# Patient Record
Sex: FEMALE | Race: WHITE | NOT HISPANIC OR LATINO | Employment: OTHER | URBAN - METROPOLITAN AREA
[De-identification: names, ages, dates, MRNs, and addresses within clinical notes are randomized per-mention and may not be internally consistent; named-entity substitution may affect disease eponyms.]

---

## 2017-03-01 ENCOUNTER — APPOINTMENT (OUTPATIENT)
Dept: LAB | Facility: CLINIC | Age: 82
End: 2017-03-01
Payer: MEDICARE

## 2017-03-01 ENCOUNTER — TRANSCRIBE ORDERS (OUTPATIENT)
Dept: LAB | Facility: CLINIC | Age: 82
End: 2017-03-01

## 2017-03-01 DIAGNOSIS — C50.919 MALIGNANT NEOPLASM OF FEMALE BREAST, UNSPECIFIED LATERALITY, UNSPECIFIED SITE OF BREAST: Primary | ICD-10-CM

## 2017-03-01 LAB
ALBUMIN SERPL BCP-MCNC: 3.8 G/DL (ref 3.5–5)
ALP SERPL-CCNC: 51 U/L (ref 46–116)
ALT SERPL W P-5'-P-CCNC: 35 U/L (ref 12–78)
ANION GAP SERPL CALCULATED.3IONS-SCNC: 9 MMOL/L (ref 4–13)
AST SERPL W P-5'-P-CCNC: 27 U/L (ref 5–45)
BASOPHILS # BLD AUTO: 0.07 THOUSANDS/ΜL (ref 0–0.1)
BASOPHILS NFR BLD AUTO: 1 % (ref 0–1)
BILIRUB SERPL-MCNC: 0.22 MG/DL (ref 0.2–1)
BUN SERPL-MCNC: 16 MG/DL (ref 5–25)
CALCIUM SERPL-MCNC: 8.6 MG/DL (ref 8.3–10.1)
CHLORIDE SERPL-SCNC: 110 MMOL/L (ref 100–108)
CO2 SERPL-SCNC: 25 MMOL/L (ref 21–32)
CREAT SERPL-MCNC: 1.09 MG/DL (ref 0.6–1.3)
EOSINOPHIL # BLD AUTO: 0.28 THOUSAND/ΜL (ref 0–0.61)
EOSINOPHIL NFR BLD AUTO: 3 % (ref 0–6)
ERYTHROCYTE [DISTWIDTH] IN BLOOD BY AUTOMATED COUNT: 14.6 % (ref 11.6–15.1)
GFR SERPL CREATININE-BSD FRML MDRD: 47.6 ML/MIN/1.73SQ M
GLUCOSE SERPL-MCNC: 136 MG/DL (ref 65–140)
HCT VFR BLD AUTO: 38.7 % (ref 34.8–46.1)
HGB BLD-MCNC: 12.4 G/DL (ref 11.5–15.4)
LYMPHOCYTES # BLD AUTO: 2.48 THOUSANDS/ΜL (ref 0.6–4.47)
LYMPHOCYTES NFR BLD AUTO: 26 % (ref 14–44)
MCH RBC QN AUTO: 30.2 PG (ref 26.8–34.3)
MCHC RBC AUTO-ENTMCNC: 32 G/DL (ref 31.4–37.4)
MCV RBC AUTO: 94 FL (ref 82–98)
MONOCYTES # BLD AUTO: 0.6 THOUSAND/ΜL (ref 0.17–1.22)
MONOCYTES NFR BLD AUTO: 6 % (ref 4–12)
NEUTROPHILS # BLD AUTO: 6.22 THOUSANDS/ΜL (ref 1.85–7.62)
NEUTS SEG NFR BLD AUTO: 64 % (ref 43–75)
NRBC BLD AUTO-RTO: 0 /100 WBCS
PLATELET # BLD AUTO: 346 THOUSANDS/UL (ref 149–390)
PMV BLD AUTO: 11 FL (ref 8.9–12.7)
POTASSIUM SERPL-SCNC: 3.9 MMOL/L (ref 3.5–5.3)
PROT SERPL-MCNC: 7.4 G/DL (ref 6.4–8.2)
RBC # BLD AUTO: 4.1 MILLION/UL (ref 3.81–5.12)
SODIUM SERPL-SCNC: 144 MMOL/L (ref 136–145)
WBC # BLD AUTO: 9.67 THOUSAND/UL (ref 4.31–10.16)

## 2017-03-01 PROCEDURE — 36415 COLL VENOUS BLD VENIPUNCTURE: CPT

## 2017-03-01 PROCEDURE — 85025 COMPLETE CBC W/AUTO DIFF WBC: CPT

## 2017-03-01 PROCEDURE — 86300 IMMUNOASSAY TUMOR CA 15-3: CPT

## 2017-03-01 PROCEDURE — 80053 COMPREHEN METABOLIC PANEL: CPT

## 2017-03-02 LAB — CANCER AG27-29 SERPL-ACNC: 17.2 U/ML (ref 0–42.3)

## 2017-03-08 ENCOUNTER — GENERIC CONVERSION - ENCOUNTER (OUTPATIENT)
Dept: OTHER | Facility: OTHER | Age: 82
End: 2017-03-08

## 2017-04-13 ENCOUNTER — ALLSCRIPTS OFFICE VISIT (OUTPATIENT)
Dept: OTHER | Facility: OTHER | Age: 82
End: 2017-04-13

## 2017-05-22 ENCOUNTER — APPOINTMENT (OUTPATIENT)
Dept: LAB | Facility: CLINIC | Age: 82
End: 2017-05-22
Payer: MEDICARE

## 2017-05-22 ENCOUNTER — TRANSCRIBE ORDERS (OUTPATIENT)
Dept: LAB | Facility: CLINIC | Age: 82
End: 2017-05-22

## 2017-05-22 DIAGNOSIS — Z79.899 ENCOUNTER FOR LONG-TERM (CURRENT) USE OF OTHER MEDICATIONS: ICD-10-CM

## 2017-05-22 DIAGNOSIS — N39.0 URINARY TRACT INFECTION, SITE NOT SPECIFIED: Primary | ICD-10-CM

## 2017-05-22 DIAGNOSIS — N39.0 URINARY TRACT INFECTION, SITE NOT SPECIFIED: ICD-10-CM

## 2017-05-22 DIAGNOSIS — I10 ESSENTIAL HYPERTENSION, MALIGNANT: ICD-10-CM

## 2017-05-22 LAB
ALBUMIN SERPL BCP-MCNC: 4.2 G/DL (ref 3.5–5)
ALP SERPL-CCNC: 46 U/L (ref 46–116)
ALT SERPL W P-5'-P-CCNC: 38 U/L (ref 12–78)
ANION GAP SERPL CALCULATED.3IONS-SCNC: 8 MMOL/L (ref 4–13)
AST SERPL W P-5'-P-CCNC: 31 U/L (ref 5–45)
BACTERIA UR QL AUTO: ABNORMAL /HPF
BASOPHILS # BLD AUTO: 0.05 THOUSANDS/ΜL (ref 0–0.1)
BASOPHILS NFR BLD AUTO: 1 % (ref 0–1)
BILIRUB SERPL-MCNC: 0.47 MG/DL (ref 0.2–1)
BILIRUB UR QL STRIP: NEGATIVE
BUN SERPL-MCNC: 20 MG/DL (ref 5–25)
CALCIUM SERPL-MCNC: 9.5 MG/DL (ref 8.3–10.1)
CHLORIDE SERPL-SCNC: 108 MMOL/L (ref 100–108)
CLARITY UR: CLEAR
CO2 SERPL-SCNC: 25 MMOL/L (ref 21–32)
COLOR UR: YELLOW
CREAT SERPL-MCNC: 1.13 MG/DL (ref 0.6–1.3)
EOSINOPHIL # BLD AUTO: 0.24 THOUSAND/ΜL (ref 0–0.61)
EOSINOPHIL NFR BLD AUTO: 3 % (ref 0–6)
ERYTHROCYTE [DISTWIDTH] IN BLOOD BY AUTOMATED COUNT: 14.2 % (ref 11.6–15.1)
GFR SERPL CREATININE-BSD FRML MDRD: 45.7 ML/MIN/1.73SQ M
GLUCOSE SERPL-MCNC: 92 MG/DL (ref 65–140)
GLUCOSE UR STRIP-MCNC: NEGATIVE MG/DL
HCT VFR BLD AUTO: 38.7 % (ref 34.8–46.1)
HGB BLD-MCNC: 12.7 G/DL (ref 11.5–15.4)
HGB UR QL STRIP.AUTO: NEGATIVE
HYALINE CASTS #/AREA URNS LPF: ABNORMAL /LPF
KETONES UR STRIP-MCNC: NEGATIVE MG/DL
LEUKOCYTE ESTERASE UR QL STRIP: ABNORMAL
LYMPHOCYTES # BLD AUTO: 2.03 THOUSANDS/ΜL (ref 0.6–4.47)
LYMPHOCYTES NFR BLD AUTO: 25 % (ref 14–44)
MCH RBC QN AUTO: 30.9 PG (ref 26.8–34.3)
MCHC RBC AUTO-ENTMCNC: 32.8 G/DL (ref 31.4–37.4)
MCV RBC AUTO: 94 FL (ref 82–98)
MONOCYTES # BLD AUTO: 0.65 THOUSAND/ΜL (ref 0.17–1.22)
MONOCYTES NFR BLD AUTO: 8 % (ref 4–12)
NEUTROPHILS # BLD AUTO: 5.19 THOUSANDS/ΜL (ref 1.85–7.62)
NEUTS SEG NFR BLD AUTO: 63 % (ref 43–75)
NITRITE UR QL STRIP: POSITIVE
NON-SQ EPI CELLS URNS QL MICRO: ABNORMAL /HPF
NRBC BLD AUTO-RTO: 0 /100 WBCS
PH UR STRIP.AUTO: 6 [PH] (ref 4.5–8)
PLATELET # BLD AUTO: 333 THOUSANDS/UL (ref 149–390)
PMV BLD AUTO: 10.7 FL (ref 8.9–12.7)
POTASSIUM SERPL-SCNC: 4.6 MMOL/L (ref 3.5–5.3)
PROT SERPL-MCNC: 7.7 G/DL (ref 6.4–8.2)
PROT UR STRIP-MCNC: NEGATIVE MG/DL
RBC # BLD AUTO: 4.11 MILLION/UL (ref 3.81–5.12)
RBC #/AREA URNS AUTO: ABNORMAL /HPF
SODIUM SERPL-SCNC: 141 MMOL/L (ref 136–145)
SP GR UR STRIP.AUTO: 1.01 (ref 1–1.03)
UROBILINOGEN UR QL STRIP.AUTO: 0.2 E.U./DL
WBC # BLD AUTO: 8.18 THOUSAND/UL (ref 4.31–10.16)
WBC #/AREA URNS AUTO: ABNORMAL /HPF

## 2017-05-22 PROCEDURE — 80053 COMPREHEN METABOLIC PANEL: CPT

## 2017-05-22 PROCEDURE — 36415 COLL VENOUS BLD VENIPUNCTURE: CPT

## 2017-05-22 PROCEDURE — 81001 URINALYSIS AUTO W/SCOPE: CPT

## 2017-05-22 PROCEDURE — 87186 SC STD MICRODIL/AGAR DIL: CPT

## 2017-05-22 PROCEDURE — 87077 CULTURE AEROBIC IDENTIFY: CPT

## 2017-05-22 PROCEDURE — 87086 URINE CULTURE/COLONY COUNT: CPT

## 2017-05-22 PROCEDURE — 85025 COMPLETE CBC W/AUTO DIFF WBC: CPT

## 2017-05-24 LAB — BACTERIA UR CULT: NORMAL

## 2017-06-08 ENCOUNTER — ALLSCRIPTS OFFICE VISIT (OUTPATIENT)
Dept: OTHER | Facility: OTHER | Age: 82
End: 2017-06-08

## 2017-10-11 ENCOUNTER — ALLSCRIPTS OFFICE VISIT (OUTPATIENT)
Dept: OTHER | Facility: OTHER | Age: 82
End: 2017-10-11

## 2017-11-29 ENCOUNTER — APPOINTMENT (OUTPATIENT)
Dept: LAB | Facility: CLINIC | Age: 82
End: 2017-11-29
Payer: MEDICARE

## 2017-11-29 ENCOUNTER — TRANSCRIBE ORDERS (OUTPATIENT)
Dept: LAB | Facility: CLINIC | Age: 82
End: 2017-11-29

## 2017-11-29 DIAGNOSIS — C50.919 MALIGNANT NEOPLASM OF FEMALE BREAST, UNSPECIFIED ESTROGEN RECEPTOR STATUS, UNSPECIFIED LATERALITY, UNSPECIFIED SITE OF BREAST (HCC): ICD-10-CM

## 2017-11-29 DIAGNOSIS — F03.90 SENILE DEMENTIA, UNCOMPLICATED (HCC): ICD-10-CM

## 2017-11-29 DIAGNOSIS — D64.89 OTHER SPECIFIED ANEMIAS (CODE): Primary | ICD-10-CM

## 2017-11-29 LAB
ALBUMIN SERPL BCP-MCNC: 3.8 G/DL (ref 3.5–5)
ALP SERPL-CCNC: 42 U/L (ref 46–116)
ALT SERPL W P-5'-P-CCNC: 31 U/L (ref 12–78)
ANION GAP SERPL CALCULATED.3IONS-SCNC: 4 MMOL/L (ref 4–13)
AST SERPL W P-5'-P-CCNC: 25 U/L (ref 5–45)
BILIRUB SERPL-MCNC: 0.26 MG/DL (ref 0.2–1)
BUN SERPL-MCNC: 22 MG/DL (ref 5–25)
CALCIUM SERPL-MCNC: 8.8 MG/DL (ref 8.3–10.1)
CHLORIDE SERPL-SCNC: 111 MMOL/L (ref 100–108)
CO2 SERPL-SCNC: 26 MMOL/L (ref 21–32)
CREAT SERPL-MCNC: 1.12 MG/DL (ref 0.6–1.3)
ERYTHROCYTE [DISTWIDTH] IN BLOOD BY AUTOMATED COUNT: 14.5 % (ref 11.6–15.1)
GFR SERPL CREATININE-BSD FRML MDRD: 45 ML/MIN/1.73SQ M
GLUCOSE SERPL-MCNC: 81 MG/DL (ref 65–140)
HCT VFR BLD AUTO: 35 % (ref 34.8–46.1)
HGB BLD-MCNC: 11.5 G/DL (ref 11.5–15.4)
MCH RBC QN AUTO: 30.9 PG (ref 26.8–34.3)
MCHC RBC AUTO-ENTMCNC: 32.9 G/DL (ref 31.4–37.4)
MCV RBC AUTO: 94 FL (ref 82–98)
PLATELET # BLD AUTO: 322 THOUSANDS/UL (ref 149–390)
PMV BLD AUTO: 11 FL (ref 8.9–12.7)
POTASSIUM SERPL-SCNC: 4.2 MMOL/L (ref 3.5–5.3)
PROT SERPL-MCNC: 7.2 G/DL (ref 6.4–8.2)
RBC # BLD AUTO: 3.72 MILLION/UL (ref 3.81–5.12)
SODIUM SERPL-SCNC: 141 MMOL/L (ref 136–145)
WBC # BLD AUTO: 8.86 THOUSAND/UL (ref 4.31–10.16)

## 2017-11-29 PROCEDURE — 80053 COMPREHEN METABOLIC PANEL: CPT

## 2017-11-29 PROCEDURE — 36415 COLL VENOUS BLD VENIPUNCTURE: CPT

## 2017-11-29 PROCEDURE — 85027 COMPLETE CBC AUTOMATED: CPT

## 2018-01-11 ENCOUNTER — HOSPITAL ENCOUNTER (EMERGENCY)
Facility: HOSPITAL | Age: 83
Discharge: HOME | End: 2018-01-11
Attending: EMERGENCY MEDICINE | Admitting: EMERGENCY MEDICINE
Payer: MEDICARE

## 2018-01-11 ENCOUNTER — APPOINTMENT (EMERGENCY)
Dept: RADIOLOGY | Facility: HOSPITAL | Age: 83
End: 2018-01-11
Payer: MEDICARE

## 2018-01-11 VITALS
WEIGHT: 120 LBS | HEART RATE: 77 BPM | OXYGEN SATURATION: 97 % | SYSTOLIC BLOOD PRESSURE: 142 MMHG | DIASTOLIC BLOOD PRESSURE: 69 MMHG | BODY MASS INDEX: 21.95 KG/M2 | TEMPERATURE: 98.3 F | RESPIRATION RATE: 18 BRPM

## 2018-01-11 DIAGNOSIS — R53.1 GENERALIZED WEAKNESS: ICD-10-CM

## 2018-01-11 DIAGNOSIS — N39.0 URINARY TRACT INFECTION IN ELDERLY PATIENT: Primary | ICD-10-CM

## 2018-01-11 DIAGNOSIS — R79.89 ELEVATED TSH: ICD-10-CM

## 2018-01-11 LAB
ALBUMIN SERPL BCP-MCNC: 3.4 G/DL (ref 3.5–5)
ALP SERPL-CCNC: 62 U/L (ref 46–116)
ALT SERPL W P-5'-P-CCNC: 55 U/L (ref 12–78)
AMMONIA PLAS-SCNC: 10 UMOL/L (ref 11–35)
ANION GAP SERPL CALCULATED.3IONS-SCNC: 8 MMOL/L (ref 4–13)
APTT PPP: 25 SECONDS (ref 24–33)
AST SERPL W P-5'-P-CCNC: 49 U/L (ref 5–45)
BACTERIA UR QL AUTO: ABNORMAL /HPF
BASOPHILS # BLD AUTO: 0 THOUSANDS/ΜL (ref 0–0.1)
BASOPHILS NFR BLD AUTO: 0 % (ref 0–1)
BILIRUB SERPL-MCNC: 0.2 MG/DL (ref 0.2–1)
BILIRUB UR QL STRIP: NEGATIVE
BUN SERPL-MCNC: 21 MG/DL (ref 5–25)
CALCIUM SERPL-MCNC: 9 MG/DL (ref 8.3–10.1)
CHLORIDE SERPL-SCNC: 107 MMOL/L (ref 100–108)
CK SERPL-CCNC: 48 U/L (ref 26–192)
CLARITY UR: ABNORMAL
CO2 SERPL-SCNC: 28 MMOL/L (ref 21–32)
COLOR UR: YELLOW
CREAT SERPL-MCNC: 1.05 MG/DL (ref 0.6–1.3)
EOSINOPHIL # BLD AUTO: 0.3 THOUSAND/ΜL (ref 0–0.61)
EOSINOPHIL NFR BLD AUTO: 3 % (ref 0–6)
ERYTHROCYTE [DISTWIDTH] IN BLOOD BY AUTOMATED COUNT: 14.3 % (ref 11.6–15.1)
FLUAV AG SPEC QL IA: NEGATIVE
FLUBV AG SPEC QL IA: NEGATIVE
GFR SERPL CREATININE-BSD FRML MDRD: 48 ML/MIN/1.73SQ M
GLUCOSE SERPL-MCNC: 86 MG/DL (ref 65–140)
GLUCOSE UR STRIP-MCNC: NEGATIVE MG/DL
HCT VFR BLD AUTO: 37.1 % (ref 37–47)
HGB BLD-MCNC: 12.1 G/DL (ref 12–16)
HGB UR QL STRIP.AUTO: ABNORMAL
INR PPP: 0.99 (ref 0.86–1.16)
KETONES UR STRIP-MCNC: NEGATIVE MG/DL
LACTATE SERPL-SCNC: 1.6 MMOL/L (ref 0.5–2)
LEUKOCYTE ESTERASE UR QL STRIP: ABNORMAL
LYMPHOCYTES # BLD AUTO: 2.3 THOUSANDS/ΜL (ref 0.6–4.47)
LYMPHOCYTES NFR BLD AUTO: 24 % (ref 14–44)
MAGNESIUM SERPL-MCNC: 2.2 MG/DL (ref 1.6–2.6)
MCH RBC QN AUTO: 30.8 PG (ref 27–31)
MCHC RBC AUTO-ENTMCNC: 32.6 G/DL (ref 31.4–37.4)
MCV RBC AUTO: 95 FL (ref 82–98)
MONOCYTES # BLD AUTO: 0.7 THOUSAND/ΜL (ref 0.17–1.22)
MONOCYTES NFR BLD AUTO: 7 % (ref 4–12)
NEUTROPHILS # BLD AUTO: 6.1 THOUSANDS/ΜL (ref 1.85–7.62)
NEUTS SEG NFR BLD AUTO: 65 % (ref 43–75)
NITRITE UR QL STRIP: NEGATIVE
NON-SQ EPI CELLS URNS QL MICRO: ABNORMAL /HPF
NRBC BLD AUTO-RTO: 0 /100 WBCS
PH UR STRIP.AUTO: 6 [PH] (ref 5–9)
PLATELET # BLD AUTO: 382 THOUSANDS/UL (ref 130–400)
PMV BLD AUTO: 8 FL (ref 8.9–12.7)
POTASSIUM SERPL-SCNC: 4.2 MMOL/L (ref 3.5–5.3)
PROT SERPL-MCNC: 7.3 G/DL (ref 6.4–8.2)
PROT UR STRIP-MCNC: NEGATIVE MG/DL
PROTHROMBIN TIME: 10.4 SECONDS (ref 9.4–11.7)
RBC # BLD AUTO: 3.93 MILLION/UL (ref 4.2–5.4)
RBC #/AREA URNS AUTO: ABNORMAL /HPF
SODIUM SERPL-SCNC: 143 MMOL/L (ref 136–145)
SP GR UR STRIP.AUTO: 1.01 (ref 1–1.03)
TROPONIN I SERPL-MCNC: <0.02 NG/ML
TSH SERPL DL<=0.05 MIU/L-ACNC: 4.48 UIU/ML (ref 0.36–3.74)
UROBILINOGEN UR QL STRIP.AUTO: 0.2 E.U./DL
WBC # BLD AUTO: 9.4 THOUSAND/UL (ref 4.8–10.8)
WBC #/AREA URNS AUTO: ABNORMAL /HPF

## 2018-01-11 PROCEDURE — 84443 ASSAY THYROID STIM HORMONE: CPT | Performed by: PHYSICIAN ASSISTANT

## 2018-01-11 PROCEDURE — 84484 ASSAY OF TROPONIN QUANT: CPT | Performed by: PHYSICIAN ASSISTANT

## 2018-01-11 PROCEDURE — 85730 THROMBOPLASTIN TIME PARTIAL: CPT | Performed by: PHYSICIAN ASSISTANT

## 2018-01-11 PROCEDURE — 85025 COMPLETE CBC W/AUTO DIFF WBC: CPT | Performed by: PHYSICIAN ASSISTANT

## 2018-01-11 PROCEDURE — 96360 HYDRATION IV INFUSION INIT: CPT

## 2018-01-11 PROCEDURE — 36415 COLL VENOUS BLD VENIPUNCTURE: CPT | Performed by: PHYSICIAN ASSISTANT

## 2018-01-11 PROCEDURE — 87798 DETECT AGENT NOS DNA AMP: CPT | Performed by: PHYSICIAN ASSISTANT

## 2018-01-11 PROCEDURE — 82550 ASSAY OF CK (CPK): CPT | Performed by: PHYSICIAN ASSISTANT

## 2018-01-11 PROCEDURE — 71045 X-RAY EXAM CHEST 1 VIEW: CPT

## 2018-01-11 PROCEDURE — 83735 ASSAY OF MAGNESIUM: CPT | Performed by: PHYSICIAN ASSISTANT

## 2018-01-11 PROCEDURE — 81001 URINALYSIS AUTO W/SCOPE: CPT | Performed by: PHYSICIAN ASSISTANT

## 2018-01-11 PROCEDURE — 82140 ASSAY OF AMMONIA: CPT | Performed by: PHYSICIAN ASSISTANT

## 2018-01-11 PROCEDURE — 83605 ASSAY OF LACTIC ACID: CPT | Performed by: PHYSICIAN ASSISTANT

## 2018-01-11 PROCEDURE — 70450 CT HEAD/BRAIN W/O DYE: CPT

## 2018-01-11 PROCEDURE — 87086 URINE CULTURE/COLONY COUNT: CPT | Performed by: PHYSICIAN ASSISTANT

## 2018-01-11 PROCEDURE — 87400 INFLUENZA A/B EACH AG IA: CPT | Performed by: PHYSICIAN ASSISTANT

## 2018-01-11 PROCEDURE — 93005 ELECTROCARDIOGRAM TRACING: CPT

## 2018-01-11 PROCEDURE — 85610 PROTHROMBIN TIME: CPT | Performed by: PHYSICIAN ASSISTANT

## 2018-01-11 PROCEDURE — 99285 EMERGENCY DEPT VISIT HI MDM: CPT

## 2018-01-11 PROCEDURE — 80053 COMPREHEN METABOLIC PANEL: CPT | Performed by: PHYSICIAN ASSISTANT

## 2018-01-11 PROCEDURE — 96361 HYDRATE IV INFUSION ADD-ON: CPT

## 2018-01-11 RX ORDER — CEPHALEXIN 250 MG/1
250 CAPSULE ORAL 4 TIMES DAILY
Qty: 28 CAPSULE | Refills: 0 | Status: SHIPPED | OUTPATIENT
Start: 2018-01-11 | End: 2018-01-18

## 2018-01-11 RX ORDER — DONEPEZIL HYDROCHLORIDE 5 MG/1
5 TABLET, FILM COATED ORAL
COMMUNITY
End: 2019-08-21 | Stop reason: SDUPTHER

## 2018-01-11 RX ORDER — TAMOXIFEN CITRATE 20 MG/1
20 TABLET ORAL 2 TIMES DAILY
COMMUNITY
End: 2018-02-14 | Stop reason: SDUPTHER

## 2018-01-11 RX ADMIN — SODIUM CHLORIDE 1000 ML: 0.9 INJECTION, SOLUTION INTRAVENOUS at 15:47

## 2018-01-11 NOTE — ED PROVIDER NOTES
History  Chief Complaint   Patient presents with    Weakness - Generalized     fell 1 week ago  family states she didnt her head  no loc  since then she has become weak and her urine is dark and foul smelling     Patient is an 17-year-old female with history of Alzheimer's dementia, hypertension, urinary incontinence, arthritis, and recent mastectomy who is brought into the emergency department by family who state that the patient fell approximately 1 week ago, and has been increasingly weak since that time with decreased appetite and increased agitation  The family denies head trauma or signs of head injury after her fall, and the patient is not on anticoagulation therapy with the exception of ASA  They state that the patient has had follow smelling urine since the weakness began  They deny fevers, chills, chest pain, shortness of breath, abdominal pain, nausea, vomiting, or vision changes  The patient offers no complaints of pain  History was limited from the patient secondary to advanced Alzheimer's  History provided by:  Patient and relative   used: No        Prior to Admission Medications   Prescriptions Last Dose Informant Patient Reported? Taking? Memantine HCl ER (NAMENDA XR) 14 MG CP24 1/11/2018 at Unknown time  Yes Yes   Sig: Take 1 tablet by mouth every morning  aspirin 81 MG tablet 1/11/2018 at Unknown time  Yes Yes   Sig: Take 81 mg by mouth every morning  donepezil (ARICEPT) 5 mg tablet 1/11/2018 at Unknown time  Yes Yes   Sig: Take 5 mg by mouth daily at bedtime   lisinopril (ZESTRIL) 20 mg tablet 1/11/2018 at Unknown time  Yes Yes   Sig: Take 20 mg by mouth every morning  metoprolol tartrate (LOPRESSOR) 25 mg tablet 1/11/2018 at Unknown time  Yes Yes   Sig: Take 25 mg by mouth every morning     tamoxifen (NOLVADEX) 20 mg tablet 1/11/2018 at Unknown time  Yes Yes   Sig: Take 20 mg by mouth 2 (two) times a day      Facility-Administered Medications: None Past Medical History:   Diagnosis Date    Arthritis     Dementia     Hypertension     Incontinent of urine     Varicose veins of legs        Past Surgical History:   Procedure Laterality Date    BREAST EXCISIONAL BIOPSY Right     CATARACT EXTRACTION W/ INTRAOCULAR LENS IMPLANT Right 01/05/2016    EYE SURGERY      lazy eye    MASTECTOMY      MODIFIED RADICAL MASTECTOMY W/ AXILLARY LYMPH NODE DISSECTION Right 6/15/2016    Procedure: MASTECTOMY MODIFIED RADICAL (WITHOUT SENTINEL NODE BIOPSY); Surgeon: Reyes Coto MD;  Location: [address] [place] [address];  Service:     OOPHORECTOMY Right     1965     [facility] [address] CATARACT EXTRACAP,INSERT LENS Left 2/23/2016    Procedure: EXTRACTION EXTRACAPSULAR CATARACT PHACO INTRAOCULAR LENS (IOL); Surgeon: Freda Caro MD;  Location: [facility] MAIN OR;  Service: Ophthalmology   [address]       History reviewed  No pertinent family history  I have reviewed and agree with the history as documented  Social History   Substance Use Topics    Smoking status: Never Smoker    Smokeless tobacco: Never Used    Alcohol use Yes      Comment: socially        Review of Systems   Unable to perform ROS: Dementia (Majority of history obtained from family at bedside)   Constitutional: Positive for appetite change and fatigue  Negative for chills, diaphoresis and fever  Eyes: Negative for photophobia and visual disturbance  Respiratory: Negative for cough, shortness of breath and wheezing  Cardiovascular: Negative for chest pain  Gastrointestinal: Negative for vomiting  Genitourinary:        Dark, foul smelling urine   Skin: Positive for pallor  Negative for rash  Neurological: Positive for weakness  Psychiatric/Behavioral: Positive for agitation  Negative for hallucinations         Physical Exam  ED Triage Vitals [01/11/18 1448]   Temperature Pulse Respirations Blood Pressure SpO2   98 3 °F (36 8 °C) 77 18 142/69 97 %      Temp src Heart Rate Source Patient Position - Orthostatic VS BP Location FiO2 (%)   -- -- -- -- --      Pain Score       No Pain           Orthostatic Vital Signs  Vitals:    01/11/18 1448   BP: 142/69   Pulse: 77       Physical Exam   Constitutional: She is oriented to person, place, and time  She appears well-developed and well-nourished  No distress  HENT:   Head: Normocephalic  Right Ear: External ear normal    Left Ear: External ear normal    Nose: Nose normal    Mouth/Throat: Mucous membranes are dry  No obvious signs of head trauma on exam    Eyes: Conjunctivae are normal  Pupils are equal, round, and reactive to light  Right eye exhibits no discharge  Left eye exhibits no discharge  Neck: Normal range of motion  Neck supple  No tracheal deviation present  Cardiovascular: Normal rate, regular rhythm, normal heart sounds and intact distal pulses  Exam reveals no friction rub  No murmur heard  Pulmonary/Chest: Effort normal and breath sounds normal  No respiratory distress  She has no wheezes  Abdominal: Soft  There is no tenderness  There is no guarding  Musculoskeletal: Normal range of motion  She exhibits no deformity  Neurological: She is alert and oriented to person, place, and time  She exhibits normal muscle tone  Coordination normal    Skin: Skin is warm and dry  Capillary refill takes less than 2 seconds  No rash noted  She is not diaphoretic  There is pallor  Psychiatric: She is agitated  Nursing note and vitals reviewed        ED Medications  Medications   sodium chloride 0 9 % bolus 1,000 mL (0 mL Intravenous Stopped 1/11/18 1830)       Diagnostic Studies  Results Reviewed     Procedure Component Value Units Date/Time    Urine Microscopic [[id number]]  (Abnormal) Collected:  01/11/18 1710    Lab Status:  Final result Specimen:  Urine from Urine, Clean Catch Updated:  01/11/18 1728     RBC, UA 1-2 (A) /hpf      WBC, UA 20-30 (A) /hpf      Epithelial Cells Moderate (A) /hpf      Bacteria, UA Occasional /hpf     Urine culture [[id number]] Collected:  01/11/18 1710    Lab Status: In process Specimen:  Urine from Urine, Clean Catch Updated:  01/11/18 1728    UA w Reflex to Microscopic w Reflex to Culture [[id number]]  (Abnormal) Collected:  01/11/18 1710    Lab Status:  Final result Specimen:  Urine from Urine, Clean Catch Updated:  01/11/18 1715     Color, UA Yellow     Clarity, UA Slightly Cloudy     Specific Gravity, UA 1 015     pH, UA 6 0     Leukocytes, UA Moderate (A)     Nitrite, UA Negative     Protein, UA Negative mg/dl      Glucose, UA Negative mg/dl      Ketones, UA Negative mg/dl      Urobilinogen, UA 0 2 E U /dl      Bilirubin, UA Negative     Blood, UA Trace-Intact (A)    Magnesium [[id number]]  (Normal) Collected:  01/11/18 1540    Lab Status:  Final result Specimen:  Blood from Arm, Left Updated:  01/11/18 1619     Magnesium 2 2 mg/dL     CK Total with Reflex CKMB [[id number]]  (Normal) Collected:  01/11/18 1540    Lab Status:  Final result Specimen:  Blood from Arm, Left Updated:  01/11/18 1619     Total CK 48 U/L     TSH [[id number]]  (Abnormal) Collected:  01/11/18 1540    Lab Status:  Final result Specimen:  Blood from Arm, Left Updated:  01/11/18 1619     TSH 3RD GENERATON 4 478 (H) uIU/mL     Narrative:         Patients undergoing fluorescein dye angiography may retain small amounts of fluorescein in the body for 48-72 hours post procedure  Samples containing fluorescein can produce falsely depressed TSH values  If the patient had this procedure,a specimen should be resubmitted post fluorescein clearance            The recommended reference ranges for TSH during pregnancy are as follows:  First trimester 0 1 to 2 5 uIU/mL  Second trimester  0 2 to 3 0 uIU/mL  Third trimester 0 3 to 3 0 uIU/m      Troponin I [[id number]]  (Normal) Collected:  01/11/18 1540    Lab Status:  Final result Specimen:  Blood from Arm, Left Updated:  01/11/18 1614     Troponin I <0 02 ng/mL     Narrative:         [employer] Chemistry analyzer 99% cutoff is > 0 04 ng/mL in network labs    o cTnI 99% cutoff is useful only when applied to patients in the clinical setting of myocardial ischemia  o cTnI 99% cutoff should be interpreted in the context of clinical history, ECG findings and possibly cardiac imaging to establish correct diagnosis  o cTnI 99% cutoff may be suggestive but clearly not indicative of a coronary event without the clinical setting of myocardial ischemia  Lactic acid, plasma [[id number]]  (Normal) Collected:  01/11/18 1540    Lab Status:  Final result Specimen:  Blood from Arm, Left Updated:  01/11/18 1613     LACTIC ACID 1 6 mmol/L     Narrative:         Result may be elevated if tourniquet was used during collection  Comprehensive metabolic panel [[id number]]  (Abnormal) Collected:  01/11/18 1540    Lab Status:  Final result Specimen:  Blood from Arm, Left Updated:  01/11/18 1611     Sodium 143 mmol/L      Potassium 4 2 mmol/L      Chloride 107 mmol/L      CO2 28 mmol/L      Anion Gap 8 mmol/L      BUN 21 mg/dL      Creatinine 1 05 mg/dL      Glucose 86 mg/dL      Calcium 9 0 mg/dL      AST 49 (H) U/L      ALT 55 U/L      Alkaline Phosphatase 62 U/L      Total Protein 7 3 g/dL      Albumin 3 4 (L) g/dL      Total Bilirubin 0 20 mg/dL      eGFR 48 ml/min/1 73sq m     Narrative:         National Kidney Disease Education Program recommendations are as follows:  GFR calculation is accurate only with a steady state creatinine  Chronic Kidney disease less than 60 ml/min/1 73 sq  meters  Kidney failure less than 15 ml/min/1 73 sq  meters      Rapid Influenza Screen with Reflex PCR (indicated for patients <2mo of age) [[id number]]  (Normal) Collected:  01/11/18 1540    Lab Status:  Final result Specimen:  Nasopharyngeal from Nasopharyngeal Swab Updated:  01/11/18 1609     Rapid Influenza A Ag Negative     Rapid Influenza B Ag Negative    Influenza A/B and RSV by PCR (Indicated for patients > 2 mo of age) [[id number]] Collected:  01/11/18 1540    Lab Status: In process Specimen:  Nasopharyngeal from Nasopharyngeal Swab Updated:  01/11/18 1609    Protime-INR [[id number]]  (Normal) Collected:  01/11/18 1540    Lab Status:  Final result Specimen:  Blood from Arm, Left Updated:  01/11/18 1608     Protime 10 4 seconds      INR 0 99    APTT [[id number]]  (Normal) Collected:  01/11/18 1540    Lab Status:  Final result Specimen:  Blood from Arm, Left Updated:  01/11/18 1608     PTT 25 seconds     Narrative: Therapeutic Heparin Range = 60-90 seconds    Ammonia [[id number]]  (Abnormal) Collected:  01/11/18 1540    Lab Status:  Final result Specimen:  Blood from Arm, Left Updated:  01/11/18 1607     Ammonia 10 (L) umol/L     CBC and differential [[id number]]  (Abnormal) Collected:  01/11/18 1540    Lab Status:  Final result Specimen:  Blood from Arm, Left Updated:  01/11/18 1552     WBC 9 40 Thousand/uL      RBC 3 93 (L) Million/uL      Hemoglobin 12 1 g/dL      Hematocrit 37 1 %      MCV 95 fL      MCH 30 8 pg      MCHC 32 6 g/dL      RDW 14 3 %      MPV 8 0 (L) fL      Platelets 971 Thousands/uL      nRBC 0 /100 WBCs      Neutrophils Relative 65 %      Lymphocytes Relative 24 %      Monocytes Relative 7 %      Eosinophils Relative 3 %      Basophils Relative 0 %      Neutrophils Absolute 6 10 Thousands/µL      Lymphocytes Absolute 2 30 Thousands/µL      Monocytes Absolute 0 70 Thousand/µL      Eosinophils Absolute 0 30 Thousand/µL      Basophils Absolute 0 00 Thousands/µL                  XR chest 1 view portable   Final Result by Mini Hamilton MD (01/11 1620)      No active pulmonary disease  Workstation performed: [id number]         CT head without contrast   Final Result by Mini Hamilton MD (01/11 1617)      No acute intracranial abnormality  Microangiopathic changes           Workstation performed: [id number]                    Procedures  ECG 12 Lead Documentation  Date/Time: 1/11/2018 3:21 PM  Performed by: Flavio Valdivia by: Analilia Burch Indications / Diagnosis:  Weakness  ECG reviewed by me, the ED Provider: yes    Patient location:  ED  Interpretation:     Interpretation: abnormal    Rate:     ECG rate:  71 bpm    ECG rate assessment: normal    Rhythm:     Rhythm: sinus rhythm    Ectopy:     Ectopy: none    QRS:     QRS axis:  Normal    QRS intervals:  Normal  Conduction:     Conduction: abnormal      Abnormal conduction: 1st degree    ST segments:     ST segments:  Normal  T waves:     T waves: normal             Phone Contacts  ED Phone Contact    ED Course  ED Course as of Jan 11 1844   Thu Jan 11, 2018   1806 Patient was refused for admission by hospitalist stating that there was no acute abnormalities that were indication for admission  MDM  Number of Diagnoses or Management Options  Elevated TSH: new and requires workup  Generalized weakness: new and requires workup  Urinary tract infection in elderly patient: new and requires workup  Diagnosis management comments: The patient's lab results returned unremarkable with the exception of UA showing UTI, and elevated TSH  Chest x-ray and CT head were unremarkable for any acute abnormalities  The patient was discharged in no acute distress after discussion with the patient's family, who agreed to discharged home  She was discharged on a course of Keflex and instructed to follow up with her PCP as soon as possible, with strict return precautions to the ED if any worsening symptoms develop         Amount and/or Complexity of Data Reviewed  Clinical lab tests: ordered and reviewed  Tests in the radiology section of CPT®: ordered and reviewed  Review and summarize past medical records: yes  Discuss the patient with other providers: yes (Dr Janelle Hardin)      CritCare Time    Disposition  Final diagnoses:   Urinary tract infection in elderly patient   Elevated TSH   Generalized weakness     Time reflects when diagnosis was documented in both MDM as applicable and the Disposition within this note     Time User Action Codes Description Comment    1/11/2018  6:05 PM Nia Bruins Add [N39 0] Urinary tract infection in elderly patient     1/11/2018  6:06 PM Nia Bruins Add [R94 6] Elevated TSH     1/11/2018  6:06 PM Nia Bruins Add [R53 1] Generalized weakness       ED Disposition     ED Disposition Condition Comment    Discharge  [address] discharge to home/self care  Condition at discharge: Stable        Follow-up Information     Follow up With Specialties Details Why [facility] Emergency Department Emergency Medicine Go to If symptoms worsen [facility]  [phone] [facility], [place], [place], [place] [address] Internal Medicine Go to As soon as possible for follow-up evaluation RUTH Burgess [facility]  [phone]           Discharge Medication List as of 1/11/2018  6:09 PM      START taking these medications    Details   cephalexin (KEFLEX) 250 mg capsule Take 1 capsule by mouth 4 (four) times a day for 7 days, Starting Thu 1/11/2018, Until Thu 1/18/2018, Print         CONTINUE these medications which have NOT CHANGED    Details   aspirin 81 MG tablet Take 81 mg by mouth every morning , Until Discontinued, Historical Med      donepezil (ARICEPT) 5 mg tablet Take 5 mg by mouth daily at bedtime, Historical Med      lisinopril (ZESTRIL) 20 mg tablet Take 20 mg by mouth every morning   , Until Discontinued, Historical Med      Memantine HCl ER (NAMENDA XR) 14 MG CP24 Take 1 tablet by mouth every morning , Until Discontinued, Historical Med      metoprolol tartrate (LOPRESSOR) 25 mg tablet Take 25 mg by mouth every morning , Until Discontinued, Historical Med      tamoxifen (NOLVADEX) 20 mg tablet Take 20 mg by mouth 2 (two) times a day, Historical Med           No discharge procedures on file      ED Provider  Electronically Signed by           Denise Solano PA-C  01/11/18 3736

## 2018-01-11 NOTE — DISCHARGE INSTRUCTIONS
Urinary Traction Infection in Older Adults   AMBULATORY CARE:   A urinary tract infection  (UTI) is caused by bacteria that get inside your urinary tract  Your urinary tract includes your kidneys, ureters, bladder, and urethra  Urine is made in your kidneys, and it flows from the ureters to the bladder  Urine leaves the bladder through the urethra  A UTI is more common in your lower urinary tract, which includes your bladder and urethra  Common signs and symptoms include the following:   · Fever and chills    · Pain or burning when you urinate    · Urine that smells bad or looks cloudy, or blood in your urine    · Urinating more often or waking from sleep to urinate    · Sudden, strong need to urinate    · Pain or pressure in your lower abdomen     · Leaking urine    · Confusion or agitation    · Fatigue, shakiness, and weakness  Seek care immediately if:   · You are urinating very little or not at all  · You are vomiting  · You have a high fever with shaking chills  · You have side or back pain that gets worse  Contact your healthcare provider if:   · You have a fever  · You are a woman and you have increased white or yellow discharge from your vagina  · You do not feel better after 2 days of taking antibiotics  · You have questions or concerns about your condition or care  Treatment:  Medicines treat the bacterial infection or decrease pain and burning when you urinate  You may also need medicines to decrease the urge to urinate often  Your healthcare provider may recommend cranberry juice or cranberry supplements to help decrease your symptoms  Self-care:   · Urinate when you feel the urge  Do not hold your urine because bacteria can grow in the bladder if urine stays in the bladder too long  It may be helpful to urinate at least every 3 to 4 hours  · Drink liquids as directed  Liquids can help flush bacteria from your urinary tract   Ask how much liquid to drink each day and which liquids are best for you  You may need to drink more liquids than usual to help flush out the bacteria  Do not drink alcohol, caffeine, and citrus juices  These can irritate your bladder and increase your symptoms  · Apply heat  on your abdomen for 20 to 30 minutes every 2 hours for as many days as directed  Heat helps decrease discomfort and pressure in your bladder  Prevent a UTI:   · Women should wipe front to back  after urinating or having a bowel movement  This may prevent germs from getting into the urinary tract  · Urinate after you have sex  to flush away bacteria that can enter your urinary tract during sex  · Wear cotton underwear and clothes that fit loose  Tight pants and nylon underwear can trap moisture and cause bacteria to grow  Follow up with your healthcare provider as directed:  Write down your questions so you remember to ask them during your visits  © 2017 [employer] Information is for End User's use only and may not be sold, redistributed or otherwise used for commercial purposes  All illustrations and images included in CareNotes® are the copyrighted property of [employer]  or Alejandro Gibson  The above information is an  only  It is not intended as medical advice for individual conditions or treatments  Talk to your doctor, nurse or pharmacist before following any medical regimen to see if it is safe and effective for you

## 2018-01-11 NOTE — CONSULTS
I had the pleasure of evaluating your patient, Raisa Lynda  My full evaluation follows:      Chief Complaint  Chief Complaint:   The patient presents to the office today with Right-sided breast cancer, follow-up  History of Present Illness  80year-old female recent found with a right breast mass (by her PCP)  Patient underwent mastectomy and is now on tamoxifen  Mrs Ameena Che is here for follow-up and is accompanied by her daughter  During a routine physical exam, Dr Thompson Sis found a large right breast mass  Mrs Ameena Che suffers from dementia and states she has had the mass for at least a year  Patient denied any pain in the breast or other parts of her body  Mrs Ameena Che agreed to a workup and is underwent right-sided mastectomy with lymph node dissection by Dr Abner Cage  PET/CT was negative for metastatic disease  Patient subsequent was started tamoxifen  Mrs Ameena Che states feeling okay, same as before  No pain control issues, no breast issues, no swelling, no bleeding  Appetite is good, activities are limited but no different than before  No fevers or signs of infection  No headaches, dizziness or body aches  Patient denies any other GI,  or GYN issues  Previously patient had problems with weight loss  This has not been a problem recently  No specific problems with the tamoxifen  Review of Systems    Constitutional: as noted in HPI and not feeling tired  Eyes: No complaints of eye pain, no red eyes, no eyesight problems, no discharge, no dry eyes, no itching of eyes  ENT: no complaints of earache, no loss of hearing, no nose bleeds, no nasal discharge, no sore throat, no hoarseness  Cardiovascular: No complaints of slow heart rate, no fast heart rate, no chest pain, no palpitations, no leg claudication, no lower extremity edema  Respiratory: No complaints of shortness of breath, no wheezing, no cough, no SOB on exertion, no orthopnea, no PND     Gastrointestinal: No complaints of abdominal pain, no constipation, no nausea or vomiting, no diarrhea, no bloody stools  Genitourinary: No complaints of dysuria, no incontinence, no pelvic pain, no dysmenorrhea, no vaginal discharge or bleeding  Musculoskeletal: No complaints of arthralgias, no myalgias, no joint swelling or stiffness, no limb pain or swelling  Integumentary: No complaints of skin rash or lesions, no itching, no skin wounds, no breast pain or lump  Neurological: confusion, but as noted in HPI  Psychiatric: Not suicidal, no sleep disturbance, no anxiety or depression, no change in personality, no emotional problems  Endocrine: No complaints of proptosis, no hot flashes, no muscle weakness, no deepening of the voice, no feelings of weakness  Hematologic/Lymphatic: No complaints of swollen glands, no swollen glands in the neck, does not bleed easily, does not bruise easily  Active Problems    1  Anemia (285 9) (D64 9)   2  Benign essential hypertension (401 1) (I10)   3  Breast cancer (174 9) (C50 919)   4  Early onset Alzheimer's dementia without behavioral disturbance (331 0,294 10)   (G30 0,F02 80)   5  Elevated blood pressure   6  Hypoglycemia (251 2) (E16 2)   7  Need for pneumococcal vaccination (V03 82) (Z23)   8  Need for prophylactic vaccination and inoculation against influenza (V04 81) (Z23)   9  Screening for cardiovascular condition (V81 2) (Z13 6)   10  Screening mammogram for high-risk patient (V76 11) (Z12 31)   11   Use of tamoxifen (Nolvadex) (V07 51) (Z79 810)    Past Medical History    · History of Breast hematoma (611 89) (N64 89)   · History of Encounter for change or removal of drains (V58 49) (Z48 03)   · History of Encounter for removal of staples (V58 32) (Z48 02)   · History of Encounter to discuss test results (V65 49) (Z71 89)   · History of pregnancy (V13 29)   · History of uterine prolapse (V13 29) (Z87 42)   · History of Menarche (V21 8)   · History of Postoperative hematoma of right eye following non-ophthalmic procedure  (998 12) (F93 384)    Surgical History    · History of Axillary Lymphadenectomy   · History of Biopsy Breast Percutaneous Needle Core   · History of Biopsy Of Ovary   · History of Breast Surgery   · History of Breast Surgery Mastectomy   · History of Eye Surgery   · History of Tonsillectomy    Family History    · No pertinent family history    · Family history of Brain tumor    Social History    · Denied: History of Drug use   ·    · Never a smoker   · Rarely consumes alcohol (V49 89) (Z78 9)    Current Meds   1  Aricept 5 MG Oral Tablet; TAKE 1 TABLET DAILY AS DIRECTED; Therapy: 86NHI1848 to Recorded   2  Aspirin Low Dose 81 MG TABS; Take 1 tablet daily; Therapy: 96JYT4062 to (Evaluate:19Jan2017); Last Rx:25Jan2016 Ordered   3  Combigan 0 2-0 5 % Ophthalmic Solution; 1 drop each eye daily; Therapy: (Recorded:13Apr2017) to Recorded   4  Lisinopril 20 MG Oral Tablet; TAKE 1 TABLET DAILY AS DIRECTED; Therapy: 38DYH1712 to (Evaluate:40Lfx4988); Last Rx:25Jan2016 Ordered   5  Metoprolol Succinate ER 25 MG Oral Tablet Extended Release 24 Hour; Take 1 tablet   daily; Therapy: (Recorded:13Apr2017) to Recorded   6  Namenda 10 MG Oral Tablet; Take one tab daily; Therapy: 44FIN9047 to Recorded   7  Tamoxifen Citrate 20 MG Oral Tablet; take one tablet by mouth every day; Therapy: 65QWT4386 to (Evaluate:87Rnc0366)  Requested for: 43IMH3985; Last   Rx:29Jun2017 Ordered    Allergies    1  No Known Drug Allergies    Vitals   Recorded: 18TWR7641 03:05PM   Temperature 96 4 F   Heart Rate 68   Respiration 16   Systolic 970   Diastolic 80   Height 5 ft 1 in   Weight 129 lb 4 oz   BMI Calculated 24 42   BSA Calculated 1 57   O2 Saturation 97     Physical Exam    Constitutional Well-nourished elderly female in no apparent distress  Eyes   Conjunctiva and lids: No swelling, erythema or discharge  Pupils and irises: Equal, round and reactive to light      Ears, Nose, Mouth, and Throat   External inspection of ears and nose: Normal     Nasal mucosa, septum, and turbinates: Normal without edema or erythema  Oropharynx: Normal with no erythema, edema, exudate or lesions  Pulmonary Scattered bilateral rhonchi  Cardiovascular   Palpation of heart: Normal PMI, no thrills  Auscultation of heart: Normal rate and rhythm, normal S1 and S2, without murmurs  Examination of extremities for edema and/or varicosities: Normal     Carotid pulses: Normal     Abdomen + Bowel sounds, nontender, no hepatosplenomegaly, no rigidity or rebound  Lymphatic No adenopathy in neck, supraclavicular region and groin bilaterally  Musculoskeletal   Gait and station: Normal     Digits and nails: Normal without clubbing or cyanosis  Inspection/palpation of joints, bones, and muscles: Normal     Skin   Skin and subcutaneous tissue: Normal without rashes or lesions  Neurologic   Cranial nerves: Cranial nerves 2-12 intact  Reflexes: 2+ and symmetric  Sensation: No sensory loss  Psychiatric   Orientation to person, place, and time: Abnormal   Responsive, pleasant, forgetful, at times confused  Mood and affect: Normal     Additional Exam:  Breasts (female present) left breast without masses, redness, retraction or dimpling, right anterior chest wall with well-healed scar, no redness or nodules no axillary adenopathy bilaterally  ECOG 1       Results/Data    Results   Pathology 6/15/16 right mastectomy demonstrated mucinous carcinoma, grade 2, 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, involving the nipple with surface ulceration  Skeletal muscle was negative for carcinoma  Lymphovascular invasion was present  Dermal lymphovascular invasion was present  DCIS and LCIS were not identified  All margins were negative for carcinoma, the closest was the posterior margin at 1 8 mm  2/21 lymph nodes were positive for metastatic carcinoma  The prior biopsy ([id number]) demonstrated ER = 100%, IN = 70-75%, HER-2/johnson 1+      AJCC = pT4b Pn1 M0 R0 G2 ER/[place] positive, HER-2/johnson negative = stage III B    5/3/16 right breast core biopsy demonstrated invasive carcinoma of no special type, no in situ carcinoma identified, lymphovascular invasion not identified, microcalcifications not identified  Radiology MRI/brain without contrast from 4/1/16 demonstrated mild to moderate chronic microvascular ischemic disease and age related atrophy  There was no evidence of any mass or swelling  Lab Results 5/22/17 WBC = 8 18 hemoglobin = 12 7 hematocrit = 39 platelet = 947 neutrophil = 63% BUN = 20 creatinine = 1 13 calcium = 9 5 LFTs WNL  3/1/17 WBC = 9 67 hemoglobin = 12 4 hematocrit = 30 7 platelet = 892 neutrophil = 64% lymphs at = 26% monocyte = 6% calcium = 8 6 LFTs WNL BUN = 16 creatinine = 1 09 CA 27, 29 = 17 2  12/8/16 WBC = 8 4 hemoglobin = 11 8 hematocrit = 36 4 platelet = 319 BUN = 16 creatinine = 1 15 calcium = 8 5 alkaline phosphatase = 49 LFTs WNL  7/19/16 WBC = 10 6 hemoglobin = 11 9 hematocrit = 37 5 platelet = 533 glucose = 23 BUN = 15 creatinine = 1 09 calcium = 9 2 LFTs WNL  6/27/16 WBC = 12 1 hemoglobin = 10 5 hematocrit = 32 platelet = 228  0/49/51 WBC = 11 46 hemoglobin = 13 7 hematocrit = 41 platelet = 429 neutrophil = 69% lymphocyte = 21% monocyte = 8% LDH = 216 BUN = 12 creatinine = 0 97 calcium = 9 3 LFTs WNL total protein = 8 4 H albumen = 4 5 CEA = 3 2 (H, 0Ã¢â¬â3 0 0) CA 27, 29 = 99 5 (H, 0Ã¢â¬â42 3)    PET PET/CT was performed on May 10, 2016  Impression stated that there was a large hypermetabolic right breast mass and right axillary metastatic adenopathy  There was no additional hypermetabolic metastases visualized  Patient had severe bilateral hydroureteronephrosis likely due to a prolapsed bladder  Patient had a ascending thoracic aorta measuring 4 x 4 2 cm  Assessment    1   Breast cancer (174 9) (K41 118)    Plan  Breast cancer    · Follow-up visit in 4 Months Evaluation and Treatment  Follow-up  Status: Hold For -  Scheduling  Requested for: 64HUI4560   Ordered; For: Breast cancer; Ordered By: Balbir Duval Performed:  Due: 35GIM0631    Discussion/Summary    63-year-old female with a large right-sided breast mass (pT4b pN1 M0 R0 G2 ER/UT positive, HER-2/johnson negative = stage III B) status post mastectomy/lymph node dissection  Preoperative PET scan did not demonstrate any evidence of metastatic disease  Previously options were discussed with patient/family  Mrs Soco Polanco is on tamoxifen and seems to be tolerating the medication well  Clinically there are no troubling signs  Patient is to continue with the tamoxifen  Mrs Soco Polanco is to return in 4 months (Dr Che Huizar has ordered blood tests -we will use these results)  Family knows to call the office if there are any other oncology questions or concerns  Tamoxifen was started in June 2016  Adjuvant online is still not back up but there is a [facility] website called [employer] that can perform similar calculations  A copy of the results has been scanned into the surgical pathology section  Predict:  80-year-old female, tumor grade = 2, number of positive nodes = 2, ER positive, HER-2/johnson negative, Ki-67 unknown  53/80 women would be alive at 5 years with no adjuvant therapy after surgery  An extra 4 women out of 100 would be alive because of hormonal therapy  An extra 7 women out of 100 would be alive because of hormonal therapy and chemotherapy    20 women out of 100 would be alive at 10 years without adjuvant treatment  An extra 5 women out of 100 would be alive because of hormonal therapy  An extra 9 women out of 100 would be alive because of hormonal therapy and chemotherapy    As discussed previously, family understands that the combined treatment chemotherapy and hormonal manipulation reduces the risk for recurrence but that the side effects and toxicities in this patient would not be acceptable   Hormonal manipulation alone demonstrates an acceptable risk reduction with minimal toxicities  Carefully review your medication list and verify that the list is accurate and up-to-date  Please call the hematology/oncology office if there are medications missing from the list, medications on the list that you are not currently taking or if there is a dosage or instruction that is different from how you're taking a medication  Thank you very much for allowing me to participate in the care of this patient  If you have any questions, please do not hesitate to contact me        Signatures   Electronically signed by : Quinton Singh MD; Oct 11 2017  3:35PM EST                       (Author)

## 2018-01-12 LAB
ATRIAL RATE: 71 BPM
BACTERIA UR CULT: NORMAL
FLUAV AG SPEC QL: NORMAL
FLUBV AG SPEC QL: NORMAL
P AXIS: 35 DEGREES
PR INTERVAL: 196 MS
QRS AXIS: -37 DEGREES
QRSD INTERVAL: 82 MS
QT INTERVAL: 416 MS
QTC INTERVAL: 452 MS
RSV B RNA SPEC QL NAA+PROBE: NORMAL
T WAVE AXIS: 0 DEGREES
VENTRICULAR RATE: 71 BPM

## 2018-01-12 NOTE — MISCELLANEOUS
Message  The visiting nurse called to update regarding Alli's incision site  The site has not gotten any worse but looks the same, the area is red, purple with some swelling  The nurse [facility]?) was informed that Dr Damari Gibson is away this week but we appreciate the updates  She was informed that Dr Roopa Vargas was on call if any emergency arise for her  The visiting nurses will be seeing her again on Thursday or Friday of this week, she stated she will update us  Lb,CMA      Active Problems    1  Anemia (285 9) (D64 9)   2  Benign essential hypertension (401 1) (I10)   3  Breast cancer (174 9) (C50 919)   4  Early onset Alzheimer's dementia without behavioral disturbance (331 0,294 10)   (G30 0,F02 80)   5  Elevated blood pressure (401 9) (I10)   6  Encounter for removal of staples (V58 32) (Z48 02)   7  Need for pneumococcal vaccination (V03 82) (Z23)   8  Need for prophylactic vaccination and inoculation against influenza (V04 81) (Z23)   9  S/P evacuation of hematoma (V45 89) (Z98 89)   10  S/P right mastectomy (V45 71) (Z90 11)   11  Screening for cardiovascular condition (V81 2) (Z13 6)   12  Screening mammogram for high-risk patient (V76 11) (Z12 31)    Current Meds   1  Amoxicillin-Pot Clavulanate 875-125 MG Oral Tablet (Augmentin); TAKE 1 TABLET   EVERY 12 HOURS DAILY; Therapy: 14KTD9051 to (Evaluate:96Nin9558)  Requested for: 22WVG6141; Last   Rx:49Orr7116 Ordered   2  Aspirin Low Dose 81 MG TABS; Take 1 tablet daily; Therapy: 41MFC2141 to (Evaluate:19Jan2017); Last Rx:25Jan2016 Ordered   3  Cefadroxil 500 MG Oral Capsule; TAKE 1 CAPSULE Every twelve hours; Therapy: 11GFS9188 to (Complete:91Yqt4810)  Requested for: 05PIW6060; Last   Rx:33Mlg7572 Ordered   4  Lisinopril 20 MG Oral Tablet; TAKE 1 TABLET DAILY AS DIRECTED; Therapy: 09AOO8743 to (Evaluate:60Hdx9757); Last Rx:25Jan2016 Ordered   5  Metoprolol Succinate ER 25 MG Oral Tablet Extended Release 24 Hour; Take 1 tablet   daily Recorded   6  Namenda XR 14 MG Oral Capsule Extended Release 24 Hour; TAKE ONE CAPSULE BY   MOUTH EVERY DAY; Therapy: 42BDM1819 to (Gertrudis Like)  Requested for: 20Apr2016; Last   Rx:20Apr2016 Ordered   7  Tamoxifen Citrate 20 MG Oral Tablet; TAKE 1 TABLET DAILY; Therapy: 01SAI5130 to (Evaluate:82Qgq5727); Last Rx:29Jun2016 Ordered    Allergies    1   No Known Drug Allergies    Signatures   Electronically signed by : Ana Lilia Gomez, ; Jul 18 2016  2:45PM EST                       (Author)

## 2018-01-13 VITALS
HEIGHT: 61 IN | DIASTOLIC BLOOD PRESSURE: 70 MMHG | WEIGHT: 128.5 LBS | TEMPERATURE: 97 F | BODY MASS INDEX: 24.26 KG/M2 | OXYGEN SATURATION: 98 % | SYSTOLIC BLOOD PRESSURE: 120 MMHG | RESPIRATION RATE: 16 BRPM | HEART RATE: 80 BPM

## 2018-01-13 VITALS
HEIGHT: 61 IN | TEMPERATURE: 96.4 F | OXYGEN SATURATION: 97 % | RESPIRATION RATE: 16 BRPM | WEIGHT: 129.25 LBS | HEART RATE: 68 BPM | SYSTOLIC BLOOD PRESSURE: 130 MMHG | DIASTOLIC BLOOD PRESSURE: 80 MMHG | BODY MASS INDEX: 24.4 KG/M2

## 2018-01-14 VITALS
DIASTOLIC BLOOD PRESSURE: 88 MMHG | OXYGEN SATURATION: 97 % | HEART RATE: 87 BPM | SYSTOLIC BLOOD PRESSURE: 142 MMHG | TEMPERATURE: 97.9 F | RESPIRATION RATE: 15 BRPM | WEIGHT: 133.25 LBS | BODY MASS INDEX: 25.16 KG/M2 | HEIGHT: 61 IN

## 2018-01-14 NOTE — RESULT NOTES
Verified Results  * MRI BRAIN WO CONTRAST 01Apr2016 10:35AM Ladi Doctor     Test Name Result Flag Reference   MRI BRAIN WO CONTRAST (Report)     This is a summary report  The complete report is available in the patient's medical record  If you cannot access the medical record, please contact the sending organization for a detailed fax or copy  MRI BRAIN WITHOUT CONTRAST     INDICATION: 51-year-old female, memory loss, senile dementia   COMPARISON:  None  TECHNIQUE: Sagittal T1, axial T2, axial FLAIR, axial T1, axial Gradient and axial diffusion imaging  IMAGE QUALITY: Diagnostic  FINDINGS:      BRAIN PARENCHYMA:    Mild to moderate chronic microvascular ischemic changes are present within the subcortical and deep white matter of the frontal and parietal lobes bilaterally  No acute ischemic disease is identified  Age-appropriate cerebral atrophy is present  There is mild prominence of the temporal horns of the lateral ventricles which may be related to hippocampal atrophy  Further evaluation with Neuroquant analysis for developing neuro degenerative disorders may be warranted if clinically appropriate  There is no discrete mass, mass effect or midline shift  No hemorrhage  Brainstem and cerebellum demonstrate normal signal  Diffusion imaging is unremarkable  VENTRICLES: The ventricles are normal in size and contour  SELLA AND PITUITARY GLAND: Normal      ORBITS: Normal      PARANASAL SINUSES: The paranasal sinuses are clear  VASCULATURE: Evaluation of the major intracranial vasculature demonstrates appropriate flow voids  CALVARIUM AND SKULL BASE: Normal      EXTRACRANIAL SOFT TISSUES: Normal        IMPRESSION:   Mild to moderate chronic microvascular ischemic disease     No acute ischemic disease     Age-related atrophy  Mild prominence of the temporal horns bilaterally possibly related to hippocampal atrophy   Further evaluation with Neuroquant analysis for possible neuro degenerative disorder may be warranted if clinically appropriate         ##sigslh##sigslh       Workstation performed: [id number]     Signed by:   Betty Pan MD   4/1/16

## 2018-01-17 NOTE — RESULT NOTES
Verified Results  (1) CBC/PLT/DIFF 68ARH4498 01:20PM Naren Almendarezn     Test Name Result Flag Reference   WBC COUNT 9 99 Thousand/uL  4 31-10 16   RBC COUNT 4 51 Million/uL  3 81-5 12   HEMOGLOBIN 13 7 g/dL  11 5-15 4   HEMATOCRIT 41 2 %  34 8-46  1   MCV 91 fL  82-98   MCH 30 4 pg  26 8-34 3   MCHC 33 3 g/dL  31 4-37 4   RDW 14 7 %  11 6-15 1   MPV 10 3 fL  8 9-12 7   PLATELET COUNT 787 Thousands/uL H 149-390   nRBC AUTOMATED 0 /100 WBCs     NEUTROPHILS RELATIVE PERCENT 66 %  43-75   LYMPHOCYTES RELATIVE PERCENT 22 %  14-44   MONOCYTES RELATIVE PERCENT 10 %  4-12   EOSINOPHILS RELATIVE PERCENT 1 %  0-6   BASOPHILS RELATIVE PERCENT 1 %  0-1   NEUTROPHILS ABSOLUTE COUNT 6 60 Thousands/µL  1 85-7 62   LYMPHOCYTES ABSOLUTE COUNT 2 17 Thousands/µL  0 60-4 47   MONOCYTES ABSOLUTE COUNT 1 04 Thousand/µL  0 17-1 22   EOSINOPHILS ABSOLUTE COUNT 0 10 Thousand/µL  0 00-0 61   BASOPHILS ABSOLUTE COUNT 0 06 Thousands/µL  0 00-0 10     (1) URINALYSIS (will reflex a microscopy if leukocytes, occult blood, protein or nitrites are not within normal limits) 18JWG1635 01:20PM Nraen Almendarezn     Test Name Result Flag Reference   COLOR Yellow     CLARITY Turbid     SPECIFIC GRAVITY UA 1 023  1 003-1 030   PH UA 6 0  4 5-8 0   LEUKOCYTE ESTERASE UA Large A Negative   NITRITE UA Negative  Negative   PROTEIN UA 30 (1+) mg/dl A Negative   GLUCOSE UA Negative mg/dl  Negative   KETONES UA Trace mg/dl A Negative   UROBILINOGEN UA 0 2 E U /dl  0 2, 1 0 E U /dl   BILIRUBIN UA Negative  Negative   BLOOD UA Small A Negative     (1) VITAMIN D 25-HYDROXY 86IRV6481 01:20PM Delphia Drown     Test Name Result Flag Reference   VIT D 25-HYDROX 30 6 ng/mL  30 0-100 0     (1) URINALYSIS (will reflex a microscopy if leukocytes, occult blood, protein or nitrites are not within normal limits) 26SVJ4056 01:20PM Delphia Babil Gamesn     Test Name Result Flag Reference   BACTERIA Innumerable /hpf A None Seen, Occasional   EPITHELIAL CELLS Innumerable /hpf A None Seen, Occasional   RBC UA None Seen /hpf  None Seen   WBC UA 30-50 /hpf A None Seen     (1) VITAMIN B12 87Cap9414 01:20PM Bin Salazar     Test Name Result Flag Reference   VITAMIN B12 5834 pg/mL H 100-900     (1) FOLATE [id number] 01:20PM Bin Slaporfirio     Test Name Result Flag Reference   FOLATE >20 0 ng/mL H 3 1-17 5     (1) TSH 69RGH6680 01:20PM Bin Salazar   Patients undergoing fluorescein dye angiography may retain small amounts of fluorescein in the body for 48-72 hours post procedure  Samples containing fluorescein can produce falsely depressed TSH values  If the patient had this procedure,a specimen should be resubmitted post fluorescein clearance          The recommended reference ranges for TSH during pregnancy are as follows:  First trimester 0 1 to 2 5 uIU/mL  Second trimester  0 2 to 3 0 uIU/mL  Third trimester 0 3 to 3 0 uIU/m     Test Name Result Flag Reference   TSH 3 590 uIU/mL  0 358-3 740

## 2018-01-22 VITALS
BODY MASS INDEX: 24.97 KG/M2 | RESPIRATION RATE: 16 BRPM | DIASTOLIC BLOOD PRESSURE: 80 MMHG | OXYGEN SATURATION: 97 % | SYSTOLIC BLOOD PRESSURE: 130 MMHG | HEART RATE: 84 BPM | WEIGHT: 132.25 LBS | HEIGHT: 61 IN | TEMPERATURE: 98.1 F

## 2018-02-14 ENCOUNTER — OFFICE VISIT (OUTPATIENT)
Dept: HEMATOLOGY ONCOLOGY | Facility: MEDICAL CENTER | Age: 83
End: 2018-02-14
Payer: MEDICARE

## 2018-02-14 VITALS
BODY MASS INDEX: 23.28 KG/M2 | DIASTOLIC BLOOD PRESSURE: 60 MMHG | WEIGHT: 123.2 LBS | SYSTOLIC BLOOD PRESSURE: 132 MMHG | HEART RATE: 71 BPM | OXYGEN SATURATION: 97 % | TEMPERATURE: 97.9 F

## 2018-02-14 DIAGNOSIS — C50.911 MALIGNANT NEOPLASM OF RIGHT BREAST IN FEMALE, ESTROGEN RECEPTOR POSITIVE, UNSPECIFIED SITE OF BREAST (HCC): Primary | ICD-10-CM

## 2018-02-14 DIAGNOSIS — Z17.0 MALIGNANT NEOPLASM OF RIGHT BREAST IN FEMALE, ESTROGEN RECEPTOR POSITIVE, UNSPECIFIED SITE OF BREAST (HCC): Primary | ICD-10-CM

## 2018-02-14 PROBLEM — C50.811 MALIGNANT NEOPLASM OF OVERLAPPING SITES OF RIGHT BREAST IN FEMALE, ESTROGEN RECEPTOR POSITIVE (HCC): Status: ACTIVE | Noted: 2018-02-14

## 2018-02-14 PROCEDURE — 99213 OFFICE O/P EST LOW 20 MIN: CPT | Performed by: INTERNAL MEDICINE

## 2018-02-14 RX ORDER — TAMOXIFEN CITRATE 20 MG/1
20 TABLET ORAL 2 TIMES DAILY
Qty: 90 TABLET | Refills: 2 | Status: SHIPPED | OUTPATIENT
Start: 2018-02-14 | End: 2018-11-12 | Stop reason: CLARIF

## 2018-02-14 NOTE — PROGRESS NOTES
Paddy Kras  12/17/1930  [facility] [facility] [place] [phone]    DISCUSSION  SUMMARY:    Issues:    1  Breast cancer  49-year-old female with a large right-sided breast mass (pT4b pN1 M0 R0 G2 ER/DE positive, HER-2/johnson negative = stage III B) status post mastectomy/lymph node dissection  Preoperative PET scan did not demonstrate any evidence of metastatic disease  Previously options were discussed with patient/family  Mrs Cameron Dave is on tamoxifen and seems to be tolerating the medication well  Clinically there are no troubling signs  Patient is to continue with the tamoxifen  Mrs Cameron Dave is to return in 4 months  Family knows to call the office if there are any other oncology questions or concerns  Tamoxifen was started in June 2016      Patient is to return in 4 months   Patient's family knows to call the hematology/oncology office if there are any other questions or concerns  Carefully review your medication list and verify that the list is accurate and up-to-date  Please call the hematology/oncology office if there are medications missing from the list, medications on the list that you are not currently taking or if there is a dosage or instruction that is different from how you're taking that medication  Patient goals and areas of care: continue with the tamoxifen  Patient is not able to self-care   _____________________________________________________________________________________    Chief Complaint   Patient presents with    Follow-up     history of stage IIIB breast cancer on tamoxifen     History of Present Illness:    49-year-old female previously found with a right breast mass (by her PCP, approximately May 2016)  Patient underwent mastectomy and is now on tamoxifen  Mrs Cameron Dave is here for follow-up and is accompanied by her daughter  On a routine physical exam, Dr Buffy Acevedo found a large right breast mass  Mrs Driss Bowie suffers from dementia but stated that she has had the mass for at least a year  Patient denied any pain in the breast or other parts of her body  Mrs Driss Bowie agreed to a workup and underwent right-sided mastectomy with lymph node dissection by Dr Madalyn Grayson  PET/CT was negative for metastatic disease  After staging, options were discussed with family  Patient subsequent was started tamoxifen (June 2016)  Patient returns for follow-up  Mrs Driss Bowie states feeling okay, same as before  No pain control issues, no breast issues, no swelling, no bleeding  Appetite is good, activities are limited but no different than before  No fevers or signs of infection  No headaches, dizziness or body aches  Patient denies any other GI,  or GYN issues  Previously patient had problems with weight loss  This has not been a problem recently  No specific problems with the tamoxifen  Daughter states that mental status is slowly deteriorating  No other active medical problems  Review of Systems   Constitutional: Negative  HENT: Negative  Eyes: Negative  Respiratory: Negative  Cardiovascular: Negative  Gastrointestinal: Negative  Endocrine: Negative  Genitourinary: Negative  Musculoskeletal: Negative  Skin: Negative  Allergic/Immunologic: Negative  Neurological: Negative  Hematological: Negative  Psychiatric/Behavioral: Positive for decreased concentration  All other systems reviewed and are negative       Patient Active Problem List   Diagnosis    S/P mastectomy    HTN (hypertension)     Past Medical History:   Diagnosis Date    Arthritis     Dementia     Hypertension     Incontinent of urine     Varicose veins of legs      Past Surgical History:   Procedure Laterality Date    BREAST EXCISIONAL BIOPSY Right     CATARACT EXTRACTION W/ INTRAOCULAR LENS IMPLANT Right 01/05/2016    EYE SURGERY      lazy eye    MASTECTOMY      MODIFIED RADICAL MASTECTOMY W/ AXILLARY LYMPH NODE DISSECTION Right 6/15/2016    Procedure: MASTECTOMY MODIFIED RADICAL (WITHOUT SENTINEL NODE BIOPSY); Surgeon: Mayra Nicholson MD;  Location: [facility];  Service:     OOPHORECTOMY Right     1965     [address] CATARACT EXTRACAP,INSERT LENS Left 2/23/2016    Procedure: EXTRACTION EXTRACAPSULAR CATARACT PHACO INTRAOCULAR LENS (IOL); Surgeon: Milka Correia MD;  Location: [facility] MAIN OR;  Service: Ophthalmology   [address]     No family history on file  Social History     Social History    Marital status: /Civil Union     Spouse name: N/A    Number of children: N/A    Years of education: N/A     Occupational History    Not on file  Social History Main Topics    Smoking status: Never Smoker    Smokeless tobacco: Never Used    Alcohol use Yes      Comment: socially    Drug use: No    Sexual activity: Not on file     Other Topics Concern    Not on file     Social History Narrative    No narrative on file       Current Outpatient Prescriptions:     aspirin 81 MG tablet, Take 81 mg by mouth every morning , Disp: , Rfl:     donepezil (ARICEPT) 5 mg tablet, Take 5 mg by mouth daily at bedtime, Disp: , Rfl:     lisinopril (ZESTRIL) 20 mg tablet, Take 20 mg by mouth every morning   , Disp: , Rfl:     Memantine HCl ER (NAMENDA XR) 7 MG CP24, Take 1 tablet by mouth every morning , Disp: , Rfl:     metoprolol tartrate (LOPRESSOR) 25 mg tablet, Take 25 mg by mouth every morning , Disp: , Rfl:     tamoxifen (NOLVADEX) 20 mg tablet, Take 1 tablet (20 mg total) by mouth 2 (two) times a day, Disp: 90 tablet, Rfl: 2    No Known Allergies    Vitals:    02/14/18 1156   BP: 132/60   Pulse: 71   Temp: 97 9 °F (36 6 °C)   SpO2: 97%     Physical Exam   Constitutional: She is oriented to person, place, and time  She appears well-developed and well-nourished  HENT:   Head: Normocephalic and atraumatic     Right Ear: External ear normal    Left Ear: External ear normal    Nose: Nose normal    Mouth/Throat: Oropharynx is clear and moist    Eyes: Conjunctivae and EOM are normal  Pupils are equal, round, and reactive to light  Neck: Normal range of motion  Neck supple  Cardiovascular: Normal rate, regular rhythm, normal heart sounds and intact distal pulses  Pulmonary/Chest: Effort normal and breath sounds normal    Abdominal: Soft  Bowel sounds are normal    Musculoskeletal: Normal range of motion  Neurological: She is alert and oriented to person, place, and time  She has normal reflexes  Skin: Skin is warm  Slightly pale, warm, moist, no petechiae or ecchymoses   Psychiatric: She has a normal mood and affect  Her behavior is normal  Judgment normal    Patient is pleasant and responsive, at times confused, speech is clear, response time is somewhat delayed   Extremities: No edema, no cords, pulses are 1+  Lymphatic: No adenopathy in the neck, supra-clavicular region, axilla and groin bilaterally  Breasts (female present) right anterior chest wall with well-healed suture line, no nodules or redness, left breast without masses, retraction, redness or dimpling, no axillary adenopathy bilaterally    Performance Status: 0 - Asymptomatic    Labs:    1/11/18 WBC = 9 4 hemoglobin = 12 1 hematocrit = 37 1 platelet = 453 neutrophil = 65% BUN = 21 creatinine = 1 05 calcium = 9 0 AST = 49 ALT = 55 alkaline phosphatase = 62 total protein = 7 3 albumin = 3 4 total bilirubin = 0 20    5/22/17 WBC = 8 18 hemoglobin = 12 7 hematocrit = 39 platelet = 757 neutrophil = 63% BUN = 20 creatinine = 1 13 calcium = 9 5 LFTs WNL    Imaging    PET/CT was performed on May 10, 2016  Impression stated that there was a large hypermetabolic right breast mass and right axillary metastatic adenopathy  There was no additional hypermetabolic metastases visualized  Patient had severe bilateral hydroureteronephrosis likely due to a prolapsed bladder  Patient had a ascending thoracic aorta measuring 4 x 4 2 cm       Pathology    6/15/16 right mastectomy demonstrated mucinous carcinoma, grade 2, 10 2 cm, involving the nipple with surface ulceration  Skeletal muscle was negative for carcinoma  Lymphovascular invasion was present  Dermal lymphovascular invasion was present  DCIS and LCIS were not identified  All margins were negative for carcinoma, the closest was the posterior margin at 1 8 mm  2/21 lymph nodes were positive for metastatic carcinoma  The prior biopsy ([id number]) demonstrated ER = 100%, [place] = 70-75%, HER-2/johnson 1+  AJCC = pT4b Pn1 M0 R0 G2 ER/[place] positive, HER-2/johnson negative = stage III B     5/3/16 right breast core biopsy demonstrated invasive carcinoma of no special type, no in situ carcinoma identified, lymphovascular invasion not identified, microcalcifications not identified

## 2018-05-15 ENCOUNTER — TRANSCRIBE ORDERS (OUTPATIENT)
Dept: LAB | Facility: CLINIC | Age: 83
End: 2018-05-15

## 2018-05-15 ENCOUNTER — APPOINTMENT (OUTPATIENT)
Dept: LAB | Facility: CLINIC | Age: 83
End: 2018-05-15
Payer: MEDICARE

## 2018-05-15 DIAGNOSIS — F03.90 SENILE DEMENTIA, UNCOMPLICATED (HCC): ICD-10-CM

## 2018-05-15 DIAGNOSIS — R32 URINARY INCONTINENCE, UNSPECIFIED TYPE: ICD-10-CM

## 2018-05-15 DIAGNOSIS — N18.2 CHRONIC KIDNEY DISEASE, STAGE II (MILD): ICD-10-CM

## 2018-05-15 DIAGNOSIS — D63.8 CHRONIC DISEASE ANEMIA: ICD-10-CM

## 2018-05-15 DIAGNOSIS — F32.A VASCULAR DEMENTIA WITH DEPRESSED MOOD (HCC): ICD-10-CM

## 2018-05-15 DIAGNOSIS — E03.9 MYXEDEMA HEART DISEASE: ICD-10-CM

## 2018-05-15 DIAGNOSIS — I51.9 MYXEDEMA HEART DISEASE: ICD-10-CM

## 2018-05-15 DIAGNOSIS — I10 ESSENTIAL HYPERTENSION, MALIGNANT: Primary | ICD-10-CM

## 2018-05-15 DIAGNOSIS — R26.9 ABNORMALITY OF GAIT: ICD-10-CM

## 2018-05-15 DIAGNOSIS — F01.50 VASCULAR DEMENTIA WITH DEPRESSED MOOD (HCC): ICD-10-CM

## 2018-05-15 LAB
ALBUMIN SERPL BCP-MCNC: 3.7 G/DL (ref 3.5–5)
ALP SERPL-CCNC: 44 U/L (ref 46–116)
ALT SERPL W P-5'-P-CCNC: 30 U/L (ref 12–78)
ANION GAP SERPL CALCULATED.3IONS-SCNC: 6 MMOL/L (ref 4–13)
AST SERPL W P-5'-P-CCNC: 23 U/L (ref 5–45)
BILIRUB SERPL-MCNC: 0.42 MG/DL (ref 0.2–1)
BUN SERPL-MCNC: 26 MG/DL (ref 5–25)
CALCIUM SERPL-MCNC: 8.6 MG/DL (ref 8.3–10.1)
CHLORIDE SERPL-SCNC: 110 MMOL/L (ref 100–108)
CO2 SERPL-SCNC: 24 MMOL/L (ref 21–32)
CREAT SERPL-MCNC: 1.18 MG/DL (ref 0.6–1.3)
ERYTHROCYTE [DISTWIDTH] IN BLOOD BY AUTOMATED COUNT: 14.7 % (ref 11.6–15.1)
GFR SERPL CREATININE-BSD FRML MDRD: 42 ML/MIN/1.73SQ M
GLUCOSE SERPL-MCNC: 110 MG/DL (ref 65–140)
HCT VFR BLD AUTO: 37.7 % (ref 34.8–46.1)
HGB BLD-MCNC: 12 G/DL (ref 11.5–15.4)
MCH RBC QN AUTO: 30.8 PG (ref 26.8–34.3)
MCHC RBC AUTO-ENTMCNC: 31.8 G/DL (ref 31.4–37.4)
MCV RBC AUTO: 97 FL (ref 82–98)
PLATELET # BLD AUTO: 312 THOUSANDS/UL (ref 149–390)
PMV BLD AUTO: 11.3 FL (ref 8.9–12.7)
POTASSIUM SERPL-SCNC: 3.7 MMOL/L (ref 3.5–5.3)
PROT SERPL-MCNC: 7.1 G/DL (ref 6.4–8.2)
RBC # BLD AUTO: 3.9 MILLION/UL (ref 3.81–5.12)
SODIUM SERPL-SCNC: 140 MMOL/L (ref 136–145)
TSH SERPL DL<=0.05 MIU/L-ACNC: 3.3 UIU/ML (ref 0.36–3.74)
WBC # BLD AUTO: 8.31 THOUSAND/UL (ref 4.31–10.16)

## 2018-05-15 PROCEDURE — 36415 COLL VENOUS BLD VENIPUNCTURE: CPT

## 2018-05-15 PROCEDURE — 80053 COMPREHEN METABOLIC PANEL: CPT

## 2018-05-15 PROCEDURE — 85027 COMPLETE CBC AUTOMATED: CPT

## 2018-05-15 PROCEDURE — 84443 ASSAY THYROID STIM HORMONE: CPT

## 2018-06-22 ENCOUNTER — APPOINTMENT (OUTPATIENT)
Dept: LAB | Facility: CLINIC | Age: 83
End: 2018-06-22
Payer: MEDICARE

## 2018-06-22 DIAGNOSIS — C50.911 MALIGNANT NEOPLASM OF RIGHT BREAST IN FEMALE, ESTROGEN RECEPTOR POSITIVE, UNSPECIFIED SITE OF BREAST (HCC): ICD-10-CM

## 2018-06-22 DIAGNOSIS — Z17.0 MALIGNANT NEOPLASM OF RIGHT BREAST IN FEMALE, ESTROGEN RECEPTOR POSITIVE, UNSPECIFIED SITE OF BREAST (HCC): ICD-10-CM

## 2018-06-22 LAB
ALBUMIN SERPL BCP-MCNC: 3.7 G/DL (ref 3.5–5)
ALP SERPL-CCNC: 42 U/L (ref 46–116)
ALT SERPL W P-5'-P-CCNC: 29 U/L (ref 12–78)
ANION GAP SERPL CALCULATED.3IONS-SCNC: 10 MMOL/L (ref 4–13)
AST SERPL W P-5'-P-CCNC: 25 U/L (ref 5–45)
BASOPHILS # BLD AUTO: 0.07 THOUSANDS/ΜL (ref 0–0.1)
BASOPHILS NFR BLD AUTO: 1 % (ref 0–1)
BILIRUB SERPL-MCNC: 0.49 MG/DL (ref 0.2–1)
BUN SERPL-MCNC: 22 MG/DL (ref 5–25)
CALCIUM SERPL-MCNC: 8.6 MG/DL (ref 8.3–10.1)
CANCER AG27-29 SERPL-ACNC: 10.6 U/ML (ref 0–42.3)
CHLORIDE SERPL-SCNC: 112 MMOL/L (ref 100–108)
CO2 SERPL-SCNC: 22 MMOL/L (ref 21–32)
CREAT SERPL-MCNC: 1.07 MG/DL (ref 0.6–1.3)
EOSINOPHIL # BLD AUTO: 0.61 THOUSAND/ΜL (ref 0–0.61)
EOSINOPHIL NFR BLD AUTO: 6 % (ref 0–6)
ERYTHROCYTE [DISTWIDTH] IN BLOOD BY AUTOMATED COUNT: 14.4 % (ref 11.6–15.1)
GFR SERPL CREATININE-BSD FRML MDRD: 47 ML/MIN/1.73SQ M
GLUCOSE P FAST SERPL-MCNC: 85 MG/DL (ref 65–99)
HCT VFR BLD AUTO: 35.9 % (ref 34.8–46.1)
HGB BLD-MCNC: 11.4 G/DL (ref 11.5–15.4)
IMM GRANULOCYTES # BLD AUTO: 0.01 THOUSAND/UL (ref 0–0.2)
IMM GRANULOCYTES NFR BLD AUTO: 0 % (ref 0–2)
LYMPHOCYTES # BLD AUTO: 2.89 THOUSANDS/ΜL (ref 0.6–4.47)
LYMPHOCYTES NFR BLD AUTO: 31 % (ref 14–44)
MCH RBC QN AUTO: 31.1 PG (ref 26.8–34.3)
MCHC RBC AUTO-ENTMCNC: 31.8 G/DL (ref 31.4–37.4)
MCV RBC AUTO: 98 FL (ref 82–98)
MONOCYTES # BLD AUTO: 0.86 THOUSAND/ΜL (ref 0.17–1.22)
MONOCYTES NFR BLD AUTO: 9 % (ref 4–12)
NEUTROPHILS # BLD AUTO: 5.02 THOUSANDS/ΜL (ref 1.85–7.62)
NEUTS SEG NFR BLD AUTO: 53 % (ref 43–75)
NRBC BLD AUTO-RTO: 0 /100 WBCS
PLATELET # BLD AUTO: 221 THOUSANDS/UL (ref 149–390)
PMV BLD AUTO: 11.8 FL (ref 8.9–12.7)
POTASSIUM SERPL-SCNC: 3.4 MMOL/L (ref 3.5–5.3)
PROT SERPL-MCNC: 7.2 G/DL (ref 6.4–8.2)
RBC # BLD AUTO: 3.66 MILLION/UL (ref 3.81–5.12)
SODIUM SERPL-SCNC: 144 MMOL/L (ref 136–145)
WBC # BLD AUTO: 9.46 THOUSAND/UL (ref 4.31–10.16)

## 2018-06-22 PROCEDURE — 85025 COMPLETE CBC W/AUTO DIFF WBC: CPT

## 2018-06-22 PROCEDURE — 86300 IMMUNOASSAY TUMOR CA 15-3: CPT

## 2018-06-22 PROCEDURE — 80053 COMPREHEN METABOLIC PANEL: CPT

## 2018-06-22 PROCEDURE — 36415 COLL VENOUS BLD VENIPUNCTURE: CPT

## 2018-07-03 ENCOUNTER — OFFICE VISIT (OUTPATIENT)
Dept: HEMATOLOGY ONCOLOGY | Facility: MEDICAL CENTER | Age: 83
End: 2018-07-03
Payer: MEDICARE

## 2018-07-03 VITALS
WEIGHT: 116 LBS | OXYGEN SATURATION: 97 % | BODY MASS INDEX: 21.35 KG/M2 | RESPIRATION RATE: 14 BRPM | SYSTOLIC BLOOD PRESSURE: 124 MMHG | HEART RATE: 65 BPM | DIASTOLIC BLOOD PRESSURE: 60 MMHG | TEMPERATURE: 98.2 F | HEIGHT: 62 IN

## 2018-07-03 DIAGNOSIS — Z17.0 MALIGNANT NEOPLASM OF OVERLAPPING SITES OF RIGHT BREAST IN FEMALE, ESTROGEN RECEPTOR POSITIVE (HCC): Primary | ICD-10-CM

## 2018-07-03 DIAGNOSIS — C50.811 MALIGNANT NEOPLASM OF OVERLAPPING SITES OF RIGHT BREAST IN FEMALE, ESTROGEN RECEPTOR POSITIVE (HCC): Primary | ICD-10-CM

## 2018-07-03 PROCEDURE — 99214 OFFICE O/P EST MOD 30 MIN: CPT | Performed by: INTERNAL MEDICINE

## 2018-07-03 RX ORDER — BRIMONIDINE TARTRATE/TIMOLOL 0.2%-0.5%
DROPS OPHTHALMIC (EYE)
COMMUNITY
Start: 2018-05-08

## 2018-07-03 RX ORDER — TAMOXIFEN CITRATE 20 MG/1
1 TABLET ORAL DAILY
COMMUNITY
Start: 2016-06-29 | End: 2018-11-12 | Stop reason: SDUPTHER

## 2018-07-03 NOTE — PROGRESS NOTES
Kristina Lanza  12/17/1930  [address] [facility] [place] [id number] [phone]    DISCUSSION  SUMMARY:    Issues:    1  Breast cancer  44-year-old female with a large right-sided breast mass (pT4b pN1 M0 R0 G2 ER/DE positive, HER-2/johnson negative = stage III B) status post mastectomy/lymph node dissection  Preoperative PET scan did not demonstrate any evidence of metastatic disease  Previously options were discussed with patient/family  Mrs Jason Huston is on tamoxifen and seems to be tolerating the medication well  Clinically there are no troubling signs  Recent blood work is good/acceptable; tumor marker continues to come down  Patient is to continue with the tamoxifen  Mrs Jason Huston is to return in 4 months  Family knows to call the office if there are any other oncology questions or concerns  Tamoxifen was started in June 2016      2   Left ear lesion  Dermatology evaluation is pending  3  Dementia  As per daughter, this seems to be getting worse  Other than clinical exams and blood work, no other testing is being performed  We talked about performing left-sided mammogram in   Mrs Jason Huston was not very keen on having this test  Patient also stated that she would not allow additional workup, biopsy etc if there was an abnormality  At this time, I do not see any reason to order the mammogram     Carefully review your medication list and verify that the list is accurate and up-to-date  Please call the hematology/oncology office if there are medications missing from the list, medications on the list that you are not currently taking or if there is a dosage or instruction that is different from how you're taking that medication      Patient goals and areas of care: continue with the tamoxifen  Patient is not able to self-care   _____________________________________________________________________________________    Chief Complaint   Patient presents with    Follow-up     Breast cancer on tamoxifen     History of Present Illness:    54-year-old female previously found with a right breast mass (by her PCP, approximately May 2016)  Patient underwent mastectomy and is now on tamoxifen  Mrs Nirmala Montague is here for follow-up and is accompanied by her daughter  On a routine physical exam, Dr Brando Gomez found a large right breast mass  Mrs Nirmala Montague suffers from dementia but stated that she has had the mass for at least a year  Patient denied any pain in the breast or other parts of her body  Mrs Nirmala Montague agreed to a workup and underwent right-sided mastectomy with lymph node dissection by Dr Anh Hung  PET/CT was negative for metastatic disease  After staging, options were discussed with family  Patient subsequent was started tamoxifen (June 2016)  Patient returns for follow-up  Mrs Nirmala Montague states feeling same as before  No pain control issues, no breast issues, no swelling, no bleeding  Appetite is good, activities are limited but no different than before  No fevers or signs of infection  No headaches, dizziness or body aches  Patient denies any other GI,  or GYN issues  No specific issues with tamoxifen  Previously patient had problems with weight loss  Daughter states that the patient meets 3 good meals each day  As above, no abdominal pain, diarrhea or constipation  No nausea vomiting  Daughter states that patient's mental status also seems to be slowly worsening but patient has become more easily upset and sandy with family members  Patient has also been picking at a left ear lesion  Review of Systems   Constitutional: Negative  HENT: Negative  Eyes: Negative  Respiratory: Negative  Cardiovascular: Negative  Gastrointestinal: Negative  Endocrine: Negative  Genitourinary: Negative  Musculoskeletal: Negative  Skin: Negative  Allergic/Immunologic: Negative  Neurological: Negative  Hematological: Negative  Psychiatric/Behavioral: Positive for decreased concentration  All other systems reviewed and are negative  Patient Active Problem List   Diagnosis    S/P mastectomy    HTN (hypertension)    Malignant neoplasm of overlapping sites of right breast in female, estrogen receptor positive ([facility] [address] )     Past Medical History:   Diagnosis Date    Arthritis     Dementia     Hypertension     Incontinent of urine     Varicose veins of legs      Past Surgical History:   Procedure Laterality Date    BREAST EXCISIONAL BIOPSY Right     CATARACT EXTRACTION W/ INTRAOCULAR LENS IMPLANT Right 01/05/2016    EYE SURGERY      lazy eye    MASTECTOMY      MODIFIED RADICAL MASTECTOMY W/ AXILLARY LYMPH NODE DISSECTION Right 6/15/2016    Procedure: MASTECTOMY MODIFIED RADICAL (WITHOUT SENTINEL NODE BIOPSY); Surgeon: Vicki Farley MD;  Location: [facility];  Service:     OOPHORECTOMY Right     1965     [address] CATARACT EXTRACAP,INSERT LENS Left 2/23/2016    Procedure: EXTRACTION EXTRACAPSULAR CATARACT PHACO INTRAOCULAR LENS (IOL); Surgeon: Marian Giles MD;  Location: [facility] MAIN OR;  Service: Ophthalmology   [address]     No family history on file  Social History     Social History    Marital status: /Civil Union     Spouse name: N/A    Number of children: N/A    Years of education: N/A     Occupational History    Not on file       Social History Main Topics    Smoking status: Never Smoker    Smokeless tobacco: Never Used    Alcohol use Yes      Comment: socially    Drug use: No    Sexual activity: Not on file     Other Topics Concern    Not on file     Social History Narrative    No narrative on file       Current Outpatient Prescriptions:     aspirin 81 MG tablet, Take 81 mg by mouth every morning , Disp: , Rfl:     COMBIGAN 0 2-0 5 %, , Disp: , Rfl:     donepezil (ARICEPT) 5 mg tablet, Take 5 mg by mouth daily at bedtime, Disp: , Rfl:     Memantine HCl ER (NAMENDA XR) 7 MG CP24, Take 1 tablet by mouth every morning , Disp: , Rfl:     metoprolol tartrate (LOPRESSOR) 25 mg tablet, Take 25 mg by mouth every morning , Disp: , Rfl:     tamoxifen (NOLVADEX) 20 mg tablet, Take 1 tablet (20 mg total) by mouth 2 (two) times a day, Disp: 90 tablet, Rfl: 2    tamoxifen (NOLVADEX) 20 mg tablet, Take 1 tablet by mouth daily, Disp: , Rfl:     lisinopril (ZESTRIL) 20 mg tablet, Take 20 mg by mouth every morning   , Disp: , Rfl:     No Known Allergies    Vitals:    07/03/18 0924   BP: 124/60   Pulse: 65   Resp: 14   Temp: 98 2 °F (36 8 °C)   SpO2: 97%     Physical Exam   Constitutional: She is oriented to person, place, and time  She appears well-developed and well-nourished  HENT:   Head: Normocephalic and atraumatic  Right Ear: External ear normal    Left Ear: External ear normal    Nose: Nose normal    Mouth/Throat: Oropharynx is clear and moist    Eyes: Conjunctivae and EOM are normal  Pupils are equal, round, and reactive to light  Neck: Normal range of motion  Neck supple  Cardiovascular: Normal rate, regular rhythm, normal heart sounds and intact distal pulses  Pulmonary/Chest: Effort normal and breath sounds normal    Abdominal: Soft  Bowel sounds are normal    Musculoskeletal: Normal range of motion  Neurological: She is alert and oriented to person, place, and time  She has normal reflexes  Skin: Skin is warm  Slightly pale, warm, moist, no petechiae or ecchymoses, left ear with scab, no signs of cellulitis, bleeding or pus   Psychiatric: She has a normal mood and affect   Her behavior is normal  Judgment normal    Patient is less pleasant than previously, able to answer simple questions, confusion is about the same as before, response time within normal limits   Extremities: No edema, no cords, pulses are 1+  Lymphatic: No adenopathy in the neck, supra-clavicular region, axilla and groin bilaterally  Breasts:  Deferred, no axillary adenopathy bilaterally    Performance Status: 0 - Asymptomatic    Labs:    06/22/2018 CA 27, 29 = 10 6 WBC = 9 4 hemoglobin = 11 4 hematocrit = 36 platelet = 181 BUN = 22 creatinine = 1 07 alkaline phosphatase = 42 LFTs WNL    1/11/18 WBC = 9 4 hemoglobin = 12 1 hematocrit = 37 1 platelet = 240 neutrophil = 65% BUN = 21 creatinine = 1 05 calcium = 9 0 AST = 49 ALT = 55 alkaline phosphatase = 62 total protein = 7 3 albumin = 3 4 total bilirubin = 0 20    5/22/17 WBC = 8 18 hemoglobin = 12 7 hematocrit = 39 platelet = 252 neutrophil = 63% BUN = 20 creatinine = 1 13 calcium = 9 5 LFTs WNL  03/01/2017 CA 27, 29 = 17 2  04/29/2016 CA 27, 29 = 99 5    Imaging    PET/CT was performed on May 10, 2016  Impression stated that there was a large hypermetabolic right breast mass and right axillary metastatic adenopathy  There was no additional hypermetabolic metastases visualized  Patient had severe bilateral hydroureteronephrosis likely due to a prolapsed bladder  Patient had a ascending thoracic aorta measuring 4 x 4 2 cm  Pathology    6/15/16 right mastectomy demonstrated mucinous carcinoma, grade 2, 10 2 cm, involving the nipple with surface ulceration  Skeletal muscle was negative for carcinoma  Lymphovascular invasion was present  Dermal lymphovascular invasion was present  DCIS and LCIS were not identified  All margins were negative for carcinoma, the closest was the posterior margin at 1 8 mm  2/21 lymph nodes were positive for metastatic carcinoma  The prior biopsy ([id number]) demonstrated ER = 100%, [place] = 70-75%, HER-2/johnson 1+  AJCC = pT4b Pn1 M0 R0 G2 ER/[place] positive, HER-2/johnson negative = stage III B    5/3/16 right breast core biopsy demonstrated invasive carcinoma of no special type, no in situ carcinoma identified, lymphovascular invasion not identified, microcalcifications not identified

## 2018-09-26 ENCOUNTER — APPOINTMENT (OUTPATIENT)
Dept: LAB | Facility: CLINIC | Age: 83
End: 2018-09-26
Payer: MEDICARE

## 2018-09-26 ENCOUNTER — TRANSCRIBE ORDERS (OUTPATIENT)
Dept: LAB | Facility: CLINIC | Age: 83
End: 2018-09-26

## 2018-09-26 DIAGNOSIS — I10 ESSENTIAL HYPERTENSION, MALIGNANT: Primary | ICD-10-CM

## 2018-09-26 DIAGNOSIS — E87.2 ACIDOSIS: ICD-10-CM

## 2018-09-26 DIAGNOSIS — F03.90 SENILE DEMENTIA, UNCOMPLICATED (HCC): ICD-10-CM

## 2018-09-26 DIAGNOSIS — N18.2 CHRONIC KIDNEY DISEASE, STAGE II (MILD): ICD-10-CM

## 2018-09-26 DIAGNOSIS — C50.919 MALIGNANT NEOPLASM OF FEMALE BREAST, UNSPECIFIED ESTROGEN RECEPTOR STATUS, UNSPECIFIED LATERALITY, UNSPECIFIED SITE OF BREAST (HCC): ICD-10-CM

## 2018-09-26 DIAGNOSIS — D63.8 CHRONIC DISEASE ANEMIA: ICD-10-CM

## 2018-09-26 LAB
ALBUMIN SERPL BCP-MCNC: 3.6 G/DL (ref 3.5–5)
ALP SERPL-CCNC: 46 U/L (ref 46–116)
ALT SERPL W P-5'-P-CCNC: 31 U/L (ref 12–78)
ANION GAP SERPL CALCULATED.3IONS-SCNC: 6 MMOL/L (ref 4–13)
AST SERPL W P-5'-P-CCNC: 24 U/L (ref 5–45)
BILIRUB SERPL-MCNC: 0.17 MG/DL (ref 0.2–1)
BUN SERPL-MCNC: 25 MG/DL (ref 5–25)
CALCIUM SERPL-MCNC: 9 MG/DL (ref 8.3–10.1)
CHLORIDE SERPL-SCNC: 111 MMOL/L (ref 100–108)
CO2 SERPL-SCNC: 24 MMOL/L (ref 21–32)
CREAT SERPL-MCNC: 1.01 MG/DL (ref 0.6–1.3)
ERYTHROCYTE [DISTWIDTH] IN BLOOD BY AUTOMATED COUNT: 14.6 % (ref 11.6–15.1)
GFR SERPL CREATININE-BSD FRML MDRD: 50 ML/MIN/1.73SQ M
GLUCOSE SERPL-MCNC: 86 MG/DL (ref 65–140)
HCT VFR BLD AUTO: 36.3 % (ref 34.8–46.1)
HGB BLD-MCNC: 11.5 G/DL (ref 11.5–15.4)
MCH RBC QN AUTO: 30.8 PG (ref 26.8–34.3)
MCHC RBC AUTO-ENTMCNC: 31.7 G/DL (ref 31.4–37.4)
MCV RBC AUTO: 97 FL (ref 82–98)
PLATELET # BLD AUTO: 249 THOUSANDS/UL (ref 149–390)
PMV BLD AUTO: 11.6 FL (ref 8.9–12.7)
POTASSIUM SERPL-SCNC: 4.3 MMOL/L (ref 3.5–5.3)
PROT SERPL-MCNC: 7.1 G/DL (ref 6.4–8.2)
RBC # BLD AUTO: 3.73 MILLION/UL (ref 3.81–5.12)
SODIUM SERPL-SCNC: 141 MMOL/L (ref 136–145)
WBC # BLD AUTO: 9.4 THOUSAND/UL (ref 4.31–10.16)

## 2018-09-26 PROCEDURE — 85027 COMPLETE CBC AUTOMATED: CPT

## 2018-09-26 PROCEDURE — 80053 COMPREHEN METABOLIC PANEL: CPT

## 2018-09-26 PROCEDURE — 36415 COLL VENOUS BLD VENIPUNCTURE: CPT

## 2018-11-12 DIAGNOSIS — Z17.0 MALIGNANT NEOPLASM OF BREAST IN FEMALE, ESTROGEN RECEPTOR POSITIVE, UNSPECIFIED LATERALITY, UNSPECIFIED SITE OF BREAST (HCC): Primary | ICD-10-CM

## 2018-11-12 DIAGNOSIS — C50.919 MALIGNANT NEOPLASM OF BREAST IN FEMALE, ESTROGEN RECEPTOR POSITIVE, UNSPECIFIED LATERALITY, UNSPECIFIED SITE OF BREAST (HCC): Primary | ICD-10-CM

## 2018-11-12 RX ORDER — TAMOXIFEN CITRATE 20 MG/1
20 TABLET ORAL DAILY
Qty: 30 TABLET | Refills: 2 | Status: SHIPPED | OUTPATIENT
Start: 2018-11-12 | End: 2019-02-14 | Stop reason: SDUPTHER

## 2018-11-13 ENCOUNTER — OFFICE VISIT (OUTPATIENT)
Dept: HEMATOLOGY ONCOLOGY | Facility: MEDICAL CENTER | Age: 83
End: 2018-11-13
Payer: MEDICARE

## 2018-11-13 VITALS
SYSTOLIC BLOOD PRESSURE: 124 MMHG | WEIGHT: 127 LBS | DIASTOLIC BLOOD PRESSURE: 68 MMHG | OXYGEN SATURATION: 96 % | TEMPERATURE: 98.3 F | HEART RATE: 63 BPM | BODY MASS INDEX: 23.37 KG/M2 | RESPIRATION RATE: 16 BRPM | HEIGHT: 62 IN

## 2018-11-13 DIAGNOSIS — C50.811 MALIGNANT NEOPLASM OF OVERLAPPING SITES OF RIGHT BREAST IN FEMALE, ESTROGEN RECEPTOR POSITIVE (HCC): Primary | ICD-10-CM

## 2018-11-13 DIAGNOSIS — Z17.0 MALIGNANT NEOPLASM OF OVERLAPPING SITES OF RIGHT BREAST IN FEMALE, ESTROGEN RECEPTOR POSITIVE (HCC): Primary | ICD-10-CM

## 2018-11-13 PROCEDURE — 99213 OFFICE O/P EST LOW 20 MIN: CPT | Performed by: INTERNAL MEDICINE

## 2018-11-13 RX ORDER — MEMANTINE HYDROCHLORIDE 10 MG/1
TABLET ORAL
COMMUNITY
Start: 2018-11-10 | End: 2019-10-15 | Stop reason: HOSPADM

## 2018-11-13 RX ORDER — FLUOCINOLONE ACETONIDE 0.25 MG/G
CREAM TOPICAL
COMMUNITY
Start: 2018-09-19

## 2018-11-13 RX ORDER — METOPROLOL SUCCINATE 25 MG/1
12.5 TABLET, EXTENDED RELEASE ORAL
Status: ON HOLD | COMMUNITY
Start: 2018-11-10 | End: 2019-10-09

## 2018-11-13 NOTE — PROGRESS NOTES
Guerrero Quiles  12/17/1930  [address] [facility] [place] [facility] [id number]    DISCUSSION  SUMMARY:    Issues:    1  Breast cancer  66-year-old female with a history of right-sided breast cancer (large mass, pT4b pN1 M0 R0 G2 ER/MT positive, HER-2/johnson negative = stage III B) status post mastectomy/lymph node dissection  Preoperative PET scan did not demonstrate any evidence of metastatic disease  Clinically there are no signs for recurrence, patient feels physically well  Because of dementia, options were discussed with patient's family previously  Mrs Daquan Galvan is to continue with the tamoxifen  Patient is to continue with self-breast exams (patient refused mammograms more recently)  Patient is to return in 5 months  Family knows to call the office if there are any other oncology questions or concerns  Tamoxifen was started in June 2016  Breast exam needs to be performed on the next office visit  2    Progressive dementia  Patient also seems to be depressed  Son states that the mental status is about the same as before but not very good  Other than periodic blood work, no other testing is being performed  Son states that routine health maintenance and medical care is otherwise up-to-date  Carefully review your medication list and verify that the list is accurate and up-to-date  Please call the hematology/oncology office if there are medications missing from the list, medications on the list that you are not currently taking or if there is a dosage or instruction that is different from how you're taking that medication      Patient goals and areas of care: continue with the tamoxifen  Barriers to care:  Patient's dementia  Patient is not able to self-care   _____________________________________________________________________________________    Chief Complaint   Patient presents with    Follow-up     Right-sided breast cancer on tamoxifen     History of Present Illness:    77-year-old female previously found with a right breast mass (by her PCP, approximately May 2016)  Patient underwent mastectomy and is now on tamoxifen  Mrs Vijay Marvin is here for follow-up and is accompanied by her son  On a routine physical exam, Dr Amanda Delgado found a large right breast mass  Mrs Vijay Marvin suffers from dementia but stated that she has had the mass for at least a year  Patient denied any pain in the breast or other parts of her body  Mrs Vijay Marvin agreed to a workup and underwent right-sided mastectomy with lymph node dissection by Dr Mee Hanna  PET/CT was negative for metastatic disease  After staging, options were discussed with family  Patient subsequent was started tamoxifen (June 2016)  Patient returns for follow-up  Mrs Vijay Marvin states feeling "lousy"  Son denies any recent medical issues or evidence of pain  Patient denies any issues with the tamoxifen  No pain control issues  Appetite is +/- but no GI,  or GYN issues  Weight is stable  Dementia is about the same as before  Activities are extremely limited but about the same as before  Review of Systems   Constitutional: Positive for fatigue  HENT: Negative  Eyes: Negative  Respiratory: Negative  Cardiovascular: Negative  Gastrointestinal: Negative  Endocrine: Negative  Genitourinary: Negative  Musculoskeletal: Negative  Skin: Negative  Allergic/Immunologic: Negative  Neurological: Negative  Hematological: Negative  Psychiatric/Behavioral: Positive for decreased concentration and dysphoric mood  All other systems reviewed and are negative       Patient Active Problem List   Diagnosis    S/P mastectomy    HTN (hypertension)    Malignant neoplasm of overlapping sites of right breast in female, estrogen receptor positive ([facility] [address] )     Past Medical History:   Diagnosis Date    Arthritis     Dementia     Hypertension     Incontinent of urine     Varicose veins of legs      Past Surgical History:   Procedure Laterality Date    BREAST EXCISIONAL BIOPSY Right     CATARACT EXTRACTION W/ INTRAOCULAR LENS IMPLANT Right 01/05/2016    EYE SURGERY      lazy eye    MASTECTOMY      MODIFIED RADICAL MASTECTOMY W/ AXILLARY LYMPH NODE DISSECTION Right 6/15/2016    Procedure: MASTECTOMY MODIFIED RADICAL (WITHOUT SENTINEL NODE BIOPSY); Surgeon: Elyse Daniel MD;  [facility];  Service:     OOPHORECTOMY Right     1965     [address] CATARACT EXTRACAP,INSERT LENS Left 2/23/2016    Procedure: EXTRACTION EXTRACAPSULAR CATARACT PHACO INTRAOCULAR LENS (IOL); Surgeon: Chacho Méndez MD;  Location: [facility] MAIN OR;  Service: Ophthalmology    TONSILLECTOMY  1935    US GUIDED BREAST BIOPSY RIGHT COMPLETE Right 5/3/2016     No family history on file  Social History     Social History    Marital status:      Spouse name: N/A    Number of children: N/A    Years of education: N/A     Occupational History    Not on file       Social History Main Topics    Smoking status: Never Smoker    Smokeless tobacco: Never Used    Alcohol use Yes      Comment: socially    Drug use: No    Sexual activity: Not on file     Other Topics Concern    Not on file     Social History Narrative    No narrative on file       Current Outpatient Prescriptions:     aspirin 81 MG tablet, Take 81 mg by mouth every morning , Disp: , Rfl:     COMBIGAN 0 2-0 5 %, , Disp: , Rfl:     donepezil (ARICEPT) 5 mg tablet, Take 5 mg by mouth daily at bedtime, Disp: , Rfl:     lisinopril (ZESTRIL) 20 mg tablet, Take 20 mg by mouth every morning   , Disp: , Rfl:     memantine (NAMENDA) 10 mg tablet, , Disp: , Rfl:     Memantine HCl ER (NAMENDA XR) 7 MG CP24, Take 1 tablet by mouth every morning , Disp: , Rfl:     metoprolol tartrate (LOPRESSOR) 25 mg tablet, Take 25 mg by mouth every morning , Disp: , Rfl:     tamoxifen (NOLVADEX) 20 mg tablet, Take 1 tablet (20 mg total) by mouth daily, Disp: 30 tablet, Rfl: 2    fluocinolone (SYNALAR) 0 025 % cream, , Disp: , Rfl:     metoprolol succinate (TOPROL-XL) 25 mg 24 hr tablet, , Disp: , Rfl:     No Known Allergies    Vitals:    11/13/18 1320   BP: 124/68   Pulse: 63   Resp: 16   Temp: 98 3 °F (36 8 °C)   SpO2: 96%     Physical Exam   Constitutional: She is oriented to person, place, and time  She appears well-developed and well-nourished  Elderly female, no respiratory distress, well nourished   HENT:   Head: Normocephalic and atraumatic  Right Ear: External ear normal    Left Ear: External ear normal    Nose: Nose normal    Mouth/Throat: Oropharynx is clear and moist    Eyes: Pupils are equal, round, and reactive to light  Conjunctivae and EOM are normal    Neck: Normal range of motion  Neck supple  Supple, no JVD   Cardiovascular: Normal rate, regular rhythm, normal heart sounds and intact distal pulses  Pulmonary/Chest: Effort normal and breath sounds normal    Fair air entry bilaterally, clear   Abdominal: Soft  Bowel sounds are normal    Soft, nontender, +bowel sounds, no hepatosplenomegaly, no rigidity or rebound   Musculoskeletal: Normal range of motion  No pain or tenderness with palpation of joints, muscles or bones   Neurological: She is alert and oriented to person, place, and time  She has normal reflexes  Skin: Skin is warm     Slightly pale, warm, moist, no petechiae or ecchymoses   Psychiatric: Her behavior is normal  Judgment normal    Patient is responsive and response time seems to be within normal limits, patient is not pleasant but is not aggressive   Extremities: No edema, no cords, pulses are 1+  Lymphatic: No adenopathy in the neck, supra-clavicular region, axilla and groin bilaterally  Breasts:  Deferred, no axillary adenopathy bilaterally    Performance Status: 0 - Asymptomatic    Labs    09/26/2018 WBC = 9 4 hemoglobin = 11 5 hematocrit = 36 3 MCV = 97 platelet = 431 BUN = 25 creatinine = 1 01 calcium = 9 0 LFTs WNL    06/22/2018 CA 27, 29 = 10 6 WBC = 9 4 hemoglobin = 11 4 hematocrit = 36 platelet = 839 BUN = 22 creatinine = 1 07 alkaline phosphatase = 42 LFTs WNL    1/11/18 WBC = 9 4 hemoglobin = 12 1 hematocrit = 37 1 platelet = 569 neutrophil = 65% BUN = 21 creatinine = 1 05 calcium = 9 0 AST = 49 ALT = 55 alkaline phosphatase = 62 total protein = 7 3 albumin = 3 4 total bilirubin = 0 20    5/22/17 WBC = 8 18 hemoglobin = 12 7 hematocrit = 39 platelet = 180 neutrophil = 63% BUN = 20 creatinine = 1 13 calcium = 9 5 LFTs WNL  03/01/2017 CA 27, 29 = 17 2  04/29/2016 CA 27, 29 = 99 5    Imaging    PET/CT was performed on May 10, 2016  Impression stated that there was a large hypermetabolic right breast mass and right axillary metastatic adenopathy  There was no additional hypermetabolic metastases visualized  Patient had severe bilateral hydroureteronephrosis likely due to a prolapsed bladder  Patient had a ascending thoracic aorta measuring 4 x 4 2 cm  Pathology    6/15/16 right mastectomy demonstrated mucinous carcinoma, grade 2, 10 2 cm, involving the nipple with surface ulceration  Skeletal muscle was negative for carcinoma  Lymphovascular invasion was present  Dermal lymphovascular invasion was present  DCIS and LCIS were not identified  All margins were negative for carcinoma, the closest was the posterior margin at 1 8 mm  2/21 lymph nodes were positive for metastatic carcinoma  The prior biopsy ([id number]) demonstrated ER = 100%, HI = 70-75%, HER-2/johnson 1+  AJCC = pT4b Pn1 M0 R0 G2 ER/HI positive, HER-2/johnson negative = stage III B    5/3/16 right breast core biopsy demonstrated invasive carcinoma of no special type, no in situ carcinoma identified, lymphovascular invasion not identified, microcalcifications not identified

## 2019-01-03 ENCOUNTER — APPOINTMENT (OUTPATIENT)
Dept: LAB | Facility: CLINIC | Age: 84
End: 2019-01-03
Payer: MEDICARE

## 2019-01-03 ENCOUNTER — TRANSCRIBE ORDERS (OUTPATIENT)
Dept: LAB | Facility: CLINIC | Age: 84
End: 2019-01-03

## 2019-01-03 DIAGNOSIS — E87.6 HYPOPOTASSEMIA: Primary | ICD-10-CM

## 2019-01-03 DIAGNOSIS — D63.8 CHRONIC DISEASE ANEMIA: ICD-10-CM

## 2019-01-03 DIAGNOSIS — F03.90 SENILE DEMENTIA, UNCOMPLICATED (HCC): ICD-10-CM

## 2019-01-03 DIAGNOSIS — N18.9 CHRONIC KIDNEY DISEASE, UNSPECIFIED CKD STAGE: ICD-10-CM

## 2019-01-03 DIAGNOSIS — C50.919 MALIGNANT NEOPLASM OF FEMALE BREAST, UNSPECIFIED ESTROGEN RECEPTOR STATUS, UNSPECIFIED LATERALITY, UNSPECIFIED SITE OF BREAST (HCC): ICD-10-CM

## 2019-01-03 LAB
ALBUMIN SERPL BCP-MCNC: 3.8 G/DL (ref 3.5–5)
ALP SERPL-CCNC: 46 U/L (ref 46–116)
ALT SERPL W P-5'-P-CCNC: 30 U/L (ref 12–78)
ANION GAP SERPL CALCULATED.3IONS-SCNC: 6 MMOL/L (ref 4–13)
AST SERPL W P-5'-P-CCNC: 28 U/L (ref 5–45)
BILIRUB SERPL-MCNC: 0.4 MG/DL (ref 0.2–1)
BUN SERPL-MCNC: 21 MG/DL (ref 5–25)
CALCIUM SERPL-MCNC: 8.7 MG/DL (ref 8.3–10.1)
CHLORIDE SERPL-SCNC: 109 MMOL/L (ref 100–108)
CHOLEST SERPL-MCNC: 163 MG/DL (ref 50–200)
CO2 SERPL-SCNC: 23 MMOL/L (ref 21–32)
CREAT SERPL-MCNC: 1.11 MG/DL (ref 0.6–1.3)
ERYTHROCYTE [DISTWIDTH] IN BLOOD BY AUTOMATED COUNT: 13.8 % (ref 11.6–15.1)
GFR SERPL CREATININE-BSD FRML MDRD: 44 ML/MIN/1.73SQ M
GLUCOSE P FAST SERPL-MCNC: 90 MG/DL (ref 65–99)
HCT VFR BLD AUTO: 39.7 % (ref 34.8–46.1)
HDLC SERPL-MCNC: 61 MG/DL (ref 40–60)
HGB BLD-MCNC: 12.6 G/DL (ref 11.5–15.4)
LDLC SERPL CALC-MCNC: 81 MG/DL (ref 0–100)
MCH RBC QN AUTO: 30.9 PG (ref 26.8–34.3)
MCHC RBC AUTO-ENTMCNC: 31.7 G/DL (ref 31.4–37.4)
MCV RBC AUTO: 97 FL (ref 82–98)
NONHDLC SERPL-MCNC: 102 MG/DL
PLATELET # BLD AUTO: 264 THOUSANDS/UL (ref 149–390)
PMV BLD AUTO: 11.2 FL (ref 8.9–12.7)
POTASSIUM SERPL-SCNC: 4.3 MMOL/L (ref 3.5–5.3)
PROT SERPL-MCNC: 7.3 G/DL (ref 6.4–8.2)
RBC # BLD AUTO: 4.08 MILLION/UL (ref 3.81–5.12)
SODIUM SERPL-SCNC: 138 MMOL/L (ref 136–145)
TRIGL SERPL-MCNC: 107 MG/DL
TSH SERPL DL<=0.05 MIU/L-ACNC: 7.15 UIU/ML (ref 0.36–3.74)
WBC # BLD AUTO: 10.92 THOUSAND/UL (ref 4.31–10.16)

## 2019-01-03 PROCEDURE — 80053 COMPREHEN METABOLIC PANEL: CPT

## 2019-01-03 PROCEDURE — 84443 ASSAY THYROID STIM HORMONE: CPT

## 2019-01-03 PROCEDURE — 80061 LIPID PANEL: CPT

## 2019-01-03 PROCEDURE — 85027 COMPLETE CBC AUTOMATED: CPT

## 2019-01-03 PROCEDURE — 36415 COLL VENOUS BLD VENIPUNCTURE: CPT

## 2019-02-14 DIAGNOSIS — C50.919 MALIGNANT NEOPLASM OF BREAST IN FEMALE, ESTROGEN RECEPTOR POSITIVE, UNSPECIFIED LATERALITY, UNSPECIFIED SITE OF BREAST (HCC): ICD-10-CM

## 2019-02-14 DIAGNOSIS — Z17.0 MALIGNANT NEOPLASM OF BREAST IN FEMALE, ESTROGEN RECEPTOR POSITIVE, UNSPECIFIED LATERALITY, UNSPECIFIED SITE OF BREAST (HCC): ICD-10-CM

## 2019-02-14 RX ORDER — TAMOXIFEN CITRATE 20 MG/1
20 TABLET ORAL DAILY
Qty: 30 TABLET | Refills: 2 | Status: SHIPPED | OUTPATIENT
Start: 2019-02-14 | End: 2019-05-13 | Stop reason: SDUPTHER

## 2019-04-16 ENCOUNTER — TRANSCRIBE ORDERS (OUTPATIENT)
Dept: LAB | Facility: CLINIC | Age: 84
End: 2019-04-16

## 2019-04-16 ENCOUNTER — APPOINTMENT (OUTPATIENT)
Dept: LAB | Facility: CLINIC | Age: 84
End: 2019-04-16
Payer: MEDICARE

## 2019-04-16 DIAGNOSIS — E03.9 MYXEDEMA HEART DISEASE: Primary | ICD-10-CM

## 2019-04-16 DIAGNOSIS — I51.9 MYXEDEMA HEART DISEASE: Primary | ICD-10-CM

## 2019-04-16 LAB
T4 FREE SERPL-MCNC: 0.84 NG/DL (ref 0.76–1.46)
T4 SERPL-MCNC: 7 UG/DL (ref 4.7–13.3)
TSH SERPL DL<=0.05 MIU/L-ACNC: 5.35 UIU/ML (ref 0.36–3.74)

## 2019-04-16 PROCEDURE — 86376 MICROSOMAL ANTIBODY EACH: CPT

## 2019-04-16 PROCEDURE — 86800 THYROGLOBULIN ANTIBODY: CPT

## 2019-04-16 PROCEDURE — 84443 ASSAY THYROID STIM HORMONE: CPT

## 2019-04-16 PROCEDURE — 36415 COLL VENOUS BLD VENIPUNCTURE: CPT

## 2019-04-16 PROCEDURE — 84439 ASSAY OF FREE THYROXINE: CPT

## 2019-04-17 LAB
THYROGLOB AB SERPL-ACNC: 7.3 IU/ML (ref 0–0.9)
THYROPEROXIDASE AB SERPL-ACNC: 372 IU/ML (ref 0–34)

## 2019-05-13 DIAGNOSIS — C50.919 MALIGNANT NEOPLASM OF BREAST IN FEMALE, ESTROGEN RECEPTOR POSITIVE, UNSPECIFIED LATERALITY, UNSPECIFIED SITE OF BREAST (HCC): ICD-10-CM

## 2019-05-13 DIAGNOSIS — Z17.0 MALIGNANT NEOPLASM OF BREAST IN FEMALE, ESTROGEN RECEPTOR POSITIVE, UNSPECIFIED LATERALITY, UNSPECIFIED SITE OF BREAST (HCC): ICD-10-CM

## 2019-05-13 RX ORDER — TAMOXIFEN CITRATE 20 MG/1
20 TABLET ORAL DAILY
Qty: 30 TABLET | Refills: 2 | Status: SHIPPED | OUTPATIENT
Start: 2019-05-13 | End: 2019-08-12 | Stop reason: SDUPTHER

## 2019-08-02 ENCOUNTER — APPOINTMENT (OUTPATIENT)
Dept: LAB | Facility: CLINIC | Age: 84
End: 2019-08-02
Payer: MEDICARE

## 2019-08-02 ENCOUNTER — TRANSCRIBE ORDERS (OUTPATIENT)
Dept: LAB | Facility: CLINIC | Age: 84
End: 2019-08-02

## 2019-08-02 DIAGNOSIS — I51.9 MYXEDEMA HEART DISEASE: ICD-10-CM

## 2019-08-02 DIAGNOSIS — E03.9 MYXEDEMA HEART DISEASE: ICD-10-CM

## 2019-08-02 DIAGNOSIS — N18.2 CHRONIC KIDNEY DISEASE, STAGE II (MILD): ICD-10-CM

## 2019-08-02 DIAGNOSIS — E78.00 PURE HYPERCHOLESTEROLEMIA: ICD-10-CM

## 2019-08-02 DIAGNOSIS — I10 ESSENTIAL HYPERTENSION, MALIGNANT: Primary | ICD-10-CM

## 2019-08-02 LAB
ALBUMIN SERPL BCP-MCNC: 3.8 G/DL (ref 3.5–5)
ALP SERPL-CCNC: 49 U/L (ref 46–116)
ALT SERPL W P-5'-P-CCNC: 28 U/L (ref 12–78)
ANION GAP SERPL CALCULATED.3IONS-SCNC: 9 MMOL/L (ref 4–13)
AST SERPL W P-5'-P-CCNC: 24 U/L (ref 5–45)
BILIRUB SERPL-MCNC: 0.32 MG/DL (ref 0.2–1)
BUN SERPL-MCNC: 22 MG/DL (ref 5–25)
CALCIUM SERPL-MCNC: 8.6 MG/DL (ref 8.3–10.1)
CHLORIDE SERPL-SCNC: 109 MMOL/L (ref 100–108)
CO2 SERPL-SCNC: 21 MMOL/L (ref 21–32)
CREAT SERPL-MCNC: 1.24 MG/DL (ref 0.6–1.3)
GFR SERPL CREATININE-BSD FRML MDRD: 39 ML/MIN/1.73SQ M
GLUCOSE SERPL-MCNC: 105 MG/DL (ref 65–140)
HCT VFR BLD AUTO: 40.1 % (ref 34.8–46.1)
HGB BLD-MCNC: 12.7 G/DL (ref 11.5–15.4)
POTASSIUM SERPL-SCNC: 3.7 MMOL/L (ref 3.5–5.3)
PROT SERPL-MCNC: 7.6 G/DL (ref 6.4–8.2)
SODIUM SERPL-SCNC: 139 MMOL/L (ref 136–145)
TSH SERPL DL<=0.05 MIU/L-ACNC: 3.74 UIU/ML (ref 0.36–3.74)

## 2019-08-02 PROCEDURE — 80053 COMPREHEN METABOLIC PANEL: CPT

## 2019-08-02 PROCEDURE — 84443 ASSAY THYROID STIM HORMONE: CPT

## 2019-08-02 PROCEDURE — 85018 HEMOGLOBIN: CPT

## 2019-08-02 PROCEDURE — 85014 HEMATOCRIT: CPT

## 2019-08-02 PROCEDURE — 36415 COLL VENOUS BLD VENIPUNCTURE: CPT

## 2019-08-12 DIAGNOSIS — C50.919 MALIGNANT NEOPLASM OF BREAST IN FEMALE, ESTROGEN RECEPTOR POSITIVE, UNSPECIFIED LATERALITY, UNSPECIFIED SITE OF BREAST (HCC): ICD-10-CM

## 2019-08-12 DIAGNOSIS — Z17.0 MALIGNANT NEOPLASM OF BREAST IN FEMALE, ESTROGEN RECEPTOR POSITIVE, UNSPECIFIED LATERALITY, UNSPECIFIED SITE OF BREAST (HCC): ICD-10-CM

## 2019-08-12 RX ORDER — TAMOXIFEN CITRATE 20 MG/1
20 TABLET ORAL DAILY
Qty: 30 TABLET | Refills: 2 | Status: SHIPPED | OUTPATIENT
Start: 2019-08-12 | End: 2019-10-15 | Stop reason: HOSPADM

## 2019-09-24 ENCOUNTER — OFFICE VISIT (OUTPATIENT)
Dept: HEMATOLOGY ONCOLOGY | Facility: MEDICAL CENTER | Age: 84
End: 2019-09-24
Payer: MEDICARE

## 2019-09-24 VITALS
TEMPERATURE: 98 F | BODY MASS INDEX: 24.17 KG/M2 | HEIGHT: 61 IN | SYSTOLIC BLOOD PRESSURE: 140 MMHG | RESPIRATION RATE: 16 BRPM | WEIGHT: 128 LBS | OXYGEN SATURATION: 98 % | DIASTOLIC BLOOD PRESSURE: 70 MMHG | HEART RATE: 84 BPM

## 2019-09-24 DIAGNOSIS — Z17.0 MALIGNANT NEOPLASM OF OVERLAPPING SITES OF RIGHT BREAST IN FEMALE, ESTROGEN RECEPTOR POSITIVE (HCC): Primary | ICD-10-CM

## 2019-09-24 DIAGNOSIS — C50.811 MALIGNANT NEOPLASM OF OVERLAPPING SITES OF RIGHT BREAST IN FEMALE, ESTROGEN RECEPTOR POSITIVE (HCC): Primary | ICD-10-CM

## 2019-09-24 PROCEDURE — 99213 OFFICE O/P EST LOW 20 MIN: CPT | Performed by: INTERNAL MEDICINE

## 2019-09-24 NOTE — PROGRESS NOTES
Dodie Levels  12/17/1930  [address] [facility] [place] [id number]    DISCUSSION  SUMMARY:    80 female with a history of breast cancer  Issues:    Breast cancer  71-year-old female with a history of right-sided breast cancer (large mass, pT4b pN1 M0 R0 G2 ER/[place] positive, HER-2/johnson negative = stage III B) status post mastectomy/lymph node dissection  Preoperative PET scan did not demonstrate any evidence of metastatic disease  Clinically there are no signs for breast cancer recurrence  It is difficult to ascertain if patient is in any evidence or pain or having other potentially fixable difficulties  Patient denies any pain, son also denies any recent issues  For the time being, Mrs Lidya Mora is to continue with the tamoxifen  Previously we had discussed options in regard to the dementia and the breast cancer  The plan is to continue with monthly self-breast exams  Patient refuses mammograms  Patient will continue with the tamoxifen for now  Patient can undergo blood work by Dr Ashkan Rivera as allowed by the patient  Generally speaking, scanning without any evidence of recurrent disease is not indicated (respectfully, less so in this patient)  If Mrs Lidya Mora should developed metastatic disease, options will be extremely limited  Another option would be to discontinue the tamoxifen entirely but at this time, patient has no side effects/toxicities from this medication  I would continue for the 5 years if patient allows  Patient was reluctant to return but consented to a 6 month follow-up  Son will call if patient changes her mind  Family knows to call the office if there are any other oncology questions or concerns  Tamoxifen was started in June 2016  Progressive dementia  As before, this continues  It has always been difficult to differentiate the dementia from the depression    Patient will follow up with her PCP  Family is monitoring the patient's p o  Intake, weight etc  Son states that routine health maintenance and medical care is otherwise up-to-date  Carefully review your medication list and verify that the list is accurate and up-to-date  Please call the hematology/oncology office if there are medications missing from the list, medications on the list that you are not currently taking or if there is a dosage or instruction that is different from how you're taking that medication  Patient goals and areas of care: continue with the tamoxifen  Barriers to care:  Patient's dementia/depression  Patient is not able to self-care   _____________________________________________________________________________________    Chief Complaint   Patient presents with    Follow-up     Breast cancer     History of Present Illness:    26-year-old female previously found with a right breast mass (by her PCP, approximately May 2016)  Patient underwent mastectomy and is now on tamoxifen  Mrs Pinky Hemphill is here for follow-up and is accompanied by her son  On a routine physical exam, Dr Poncho Randle found a large right breast mass  Mrs Pinky Hemphill suffers from dementia but stated that she has had the mass for at least a year  Patient denied any pain in the breast or other parts of her body  Mrs Pinky Hemphill agreed to a workup and underwent right-sided mastectomy with lymph node dissection by Dr Sonia Fabian  PET/CT was negative for metastatic disease  After staging, options were discussed with family  Patient subsequent was started tamoxifen (June 2016)  Patient returns for follow-up and is accompanied by her son  Mrs Pinky Hemphill once again is not very talkative  Patient denies any pain control issues  Patient recently developed a rash on her nose that was that she, unfortunately Mrs Pinky Hemphill picked at the rash and now has a number of scabs  Patient now has lotion for the scabs  Son denies any evidence of pain control issues    Appetite has decreased, weight has been stable  Activities are extremely limited but no different than before  Review of Systems   Constitutional: Positive for fatigue  HENT: Negative  Eyes: Negative  Respiratory: Negative  Cardiovascular: Negative  Gastrointestinal: Negative  Endocrine: Negative  Genitourinary: Negative  Musculoskeletal: Negative  Skin: Negative  Allergic/Immunologic: Negative  Neurological: Negative  Hematological: Negative  Psychiatric/Behavioral: Positive for decreased concentration and dysphoric mood  All other systems reviewed and are negative  Patient Active Problem List   Diagnosis    S/P mastectomy    HTN (hypertension)    Malignant neoplasm of overlapping sites of right breast in female, estrogen receptor positive ([facility] [address] )     Past Medical History:   Diagnosis Date    Arthritis     Dementia     Hypertension     Incontinent of urine     Varicose veins of legs      Past Surgical History:   Procedure Laterality Date    BREAST EXCISIONAL BIOPSY Right     CATARACT EXTRACTION W/ INTRAOCULAR LENS IMPLANT Right 01/05/2016    EYE SURGERY      lazy eye    MASTECTOMY      MODIFIED RADICAL MASTECTOMY W/ AXILLARY LYMPH NODE DISSECTION Right 6/15/2016    Procedure: MASTECTOMY MODIFIED RADICAL (WITHOUT SENTINEL NODE BIOPSY); Surgeon: Shaun Bamberger, MD;  Location: [address] [place] [address];  Service:     OOPHORECTOMY Right     1965     [place] [facility] CATARACT EXTRACAP,INSERT LENS Left 2/23/2016    Procedure: EXTRACTION EXTRACAPSULAR CATARACT PHACO INTRAOCULAR LENS (IOL); Surgeon: Hawa Gordon MD;  Location: [facility] MAIN OR;  Service: Ophthalmology    TONSILLECTOMY  1935    US GUIDED BREAST BIOPSY RIGHT COMPLETE Right 5/3/2016     No family history on file  Social History     Socioeconomic History    Marital status:       Spouse name: Not on file    Number of children: Not on file    Years of education: Not on file    Highest education level: Not on file   Occupational History    Not on file   Social Needs    Financial resource strain: Not on file    Food insecurity:     Worry: Not on file     Inability: Not on file    Transportation needs:     Medical: Not on file     Non-medical: Not on file   Tobacco Use    Smoking status: Never Smoker    Smokeless tobacco: Never Used   Substance and Sexual Activity    Alcohol use: Yes     Comment: socially    Drug use: No    Sexual activity: Not on file   Lifestyle    Physical activity:     Days per week: Not on file     Minutes per session: Not on file    Stress: Not on file   Relationships    Social connections:     Talks on phone: Not on file     Gets together: Not on file     Attends Confucianist service: Not on file     Active member of club or organization: Not on file     Attends meetings of clubs or organizations: Not on file     Relationship status: Not on file    Intimate partner violence:     Fear of current or ex partner: Not on file     Emotionally abused: Not on file     Physically abused: Not on file     Forced sexual activity: Not on file   Other Topics Concern    Not on file   Social History Narrative    Not on file       Current Outpatient Medications:     aspirin 81 MG tablet, Take 81 mg by mouth every morning , Disp: , Rfl:     COMBIGAN 0 2-0 5 %, , Disp: , Rfl:     donepezil (ARICEPT) 5 mg tablet, Take 1 tablet (5 mg total) by mouth daily at bedtime, Disp: 90 tablet, Rfl: 1    fluocinolone (SYNALAR) 0 025 % cream, , Disp: , Rfl:     lisinopril (ZESTRIL) 20 mg tablet, Take 20 mg by mouth every morning   , Disp: , Rfl:     LORazepam (ATIVAN) 0 5 mg tablet, Take 1 tablet (0 5 mg total) by mouth every 8 (eight) hours as needed for anxiety, Disp: 60 tablet, Rfl: 1    memantine (NAMENDA) 10 mg tablet, , Disp: , Rfl:     Memantine HCl ER (NAMENDA XR) 7 MG CP24, Take 1 tablet by mouth every morning , Disp: , Rfl:     metoprolol succinate (TOPROL-XL) 25 mg 24 hr tablet, Take 12 5 mg by mouth , Disp: , Rfl:    metoprolol tartrate (LOPRESSOR) 25 mg tablet, Take 25 mg by mouth every morning , Disp: , Rfl:     tamoxifen (NOLVADEX) 20 mg tablet, Take 1 tablet (20 mg total) by mouth daily, Disp: 30 tablet, Rfl: 2    No Known Allergies    Vitals:    09/24/19 1320   BP: 140/70   Pulse: 84   Resp: 16   Temp: 98 °F (36 7 °C)   SpO2: 98%     Physical Exam   Constitutional: She is oriented to person, place, and time  She appears well-developed and well-nourished  Elderly female, no respiratory distress, well nourished   HENT:   Head: Normocephalic and atraumatic  Right Ear: External ear normal    Left Ear: External ear normal    Nose: Nose normal    Mouth/Throat: Oropharynx is clear and moist    Eyes: Pupils are equal, round, and reactive to light  Conjunctivae and EOM are normal    Neck: Normal range of motion  Neck supple  Supple, no JVD   Cardiovascular: Normal rate, regular rhythm, normal heart sounds and intact distal pulses  Pulmonary/Chest: Effort normal and breath sounds normal    Fair air entry bilaterally, clear   Abdominal: Soft  Bowel sounds are normal    Soft, nontender, +bowel sounds, no hepatosplenomegaly, no rigidity or rebound   Musculoskeletal: Normal range of motion  No pain or tenderness with palpation of joints, muscles or bones   Neurological: She is alert and oriented to person, place, and time  She has normal reflexes  Skin: Skin is warm     Slightly pale, warm, moist, no petechiae or ecchymoses   Psychiatric: Her behavior is normal  Judgment normal    Patient is responsive, response time is within normal limits, patient is not pleasant, same as before but is not aggressive either   Extremities: No lower extremity edema, no cords, pulses are 1+  Lymphatic: No adenopathy in the neck, supra-clavicular region, axilla and groin bilaterally  Breasts:  Deferred, no axillary adenopathy bilaterally    Performance Status: 0 - Asymptomatic    Labs    08/02/2019 hemoglobin = 12 7 hematocrit = 40 1 BUN = 22 creatinine = 1 24 LFTs WNL calcium = 8 6    01/03/2019 WBC = 10 9 hemoglobin = 12 6 hematocrit = 39 7 platelet = 657 BUN = 21 creatinine = 1 11 calcium = 8 7 LFTs WNL  09/26/2018 WBC = 9 4 hemoglobin = 11 5 hematocrit = 36 3 MCV = 97 platelet = 738 BUN = 25 creatinine = 1 01 calcium = 9 0 LFTs WNL  06/22/2018 CA 27, 29 = 10 6 WBC = 9 4 hemoglobin = 11 4 hematocrit = 36 platelet = 657 BUN = 22 creatinine = 1 07 alkaline phosphatase = 42 LFTs WNL  1/11/18 WBC = 9 4 hemoglobin = 12 1 hematocrit = 37 1 platelet = 426 neutrophil = 65% BUN = 21 creatinine = 1 05 calcium = 9 0 AST = 49 ALT = 55 alkaline phosphatase = 62 total protein = 7 3 albumin = 3 4 total bilirubin = 0 20  5/22/17 WBC = 8 18 hemoglobin = 12 7 hematocrit = 39 platelet = 882 neutrophil = 63% BUN = 20 creatinine = 1 13 calcium = 9 5 LFTs [facility]  03/01/2017 CA 27, 29 = 17 2  04/29/2016 CA 27, 29 = 99 5    Imaging    PET/CT was performed on May 10, 2016  Impression stated that there was a large hypermetabolic right breast mass and right axillary metastatic adenopathy  There was no additional hypermetabolic metastases visualized  Patient had severe bilateral hydroureteronephrosis likely due to a prolapsed bladder  Patient had a ascending thoracic aorta measuring 4 x 4 2 cm  Pathology    6/15/16 right mastectomy demonstrated mucinous carcinoma, grade 2, 10 2 cm, involving the nipple with surface ulceration  Skeletal muscle was negative for carcinoma  Lymphovascular invasion was present  Dermal lymphovascular invasion was present  DCIS and LCIS were not identified  All margins were negative for carcinoma, the closest was the posterior margin at 1 8 mm  2/21 lymph nodes were positive for metastatic carcinoma  The prior biopsy ([id number]) demonstrated ER = 100%, FL = 70-75%, HER-2/johnson 1+      AJCC = pT4b Pn1 M0 R0 G2 ER/FL positive, HER-2/johnson negative = stage III B    5/3/16 right breast core biopsy demonstrated invasive carcinoma of no special type, no in situ carcinoma identified, lymphovascular invasion not identified, microcalcifications not identified

## 2019-10-09 ENCOUNTER — APPOINTMENT (EMERGENCY)
Dept: RADIOLOGY | Facility: HOSPITAL | Age: 84
DRG: 871 | End: 2019-10-09
Payer: MEDICARE

## 2019-10-09 ENCOUNTER — HOSPITAL ENCOUNTER (INPATIENT)
Facility: HOSPITAL | Age: 84
LOS: 6 days | Discharge: NURSING, REHAB OR LONG-TERM CARE | DRG: 871 | End: 2019-10-15
Attending: EMERGENCY MEDICINE | Admitting: FAMILY MEDICINE
Payer: MEDICARE

## 2019-10-09 DIAGNOSIS — G30.9 ALZHEIMER'S DEMENTIA WITH BEHAVIORAL DISTURBANCE, UNSPECIFIED TIMING OF DEMENTIA ONSET (HCC): ICD-10-CM

## 2019-10-09 DIAGNOSIS — R77.8 ELEVATED TROPONIN: Primary | ICD-10-CM

## 2019-10-09 DIAGNOSIS — N39.0 UTI (URINARY TRACT INFECTION): ICD-10-CM

## 2019-10-09 DIAGNOSIS — I95.9 HYPOTENSION: ICD-10-CM

## 2019-10-09 DIAGNOSIS — I26.99 PULMONARY EMBOLISM (HCC): ICD-10-CM

## 2019-10-09 DIAGNOSIS — A41.9 SEVERE SEPSIS (HCC): ICD-10-CM

## 2019-10-09 DIAGNOSIS — R16.0 LIVER MASS: ICD-10-CM

## 2019-10-09 DIAGNOSIS — F02.81 ALZHEIMER'S DEMENTIA WITH BEHAVIORAL DISTURBANCE, UNSPECIFIED TIMING OF DEMENTIA ONSET (HCC): ICD-10-CM

## 2019-10-09 DIAGNOSIS — I21.A1 TYPE 2 MYOCARDIAL INFARCTION (HCC): ICD-10-CM

## 2019-10-09 DIAGNOSIS — R65.20 SEVERE SEPSIS (HCC): ICD-10-CM

## 2019-10-09 PROBLEM — N30.00 ACUTE CYSTITIS: Status: ACTIVE | Noted: 2019-10-09

## 2019-10-09 PROBLEM — R19.7 DIARRHEA: Status: ACTIVE | Noted: 2019-10-09

## 2019-10-09 PROBLEM — R53.1 WEAKNESS GENERALIZED: Status: ACTIVE | Noted: 2019-10-09

## 2019-10-09 PROBLEM — F03.91 MODERATE DEMENTIA WITH BEHAVIORAL DISTURBANCE (HCC): Status: ACTIVE | Noted: 2019-10-09

## 2019-10-09 LAB
ABO GROUP BLD: NORMAL
ALBUMIN SERPL BCP-MCNC: 3.6 G/DL (ref 3.5–5)
ALP SERPL-CCNC: 49 U/L (ref 46–116)
ALT SERPL W P-5'-P-CCNC: 36 U/L (ref 12–78)
ANION GAP SERPL CALCULATED.3IONS-SCNC: 13 MMOL/L (ref 4–13)
APTT PPP: 22 SECONDS (ref 25–32)
AST SERPL W P-5'-P-CCNC: 33 U/L (ref 5–45)
ATRIAL RATE: 105 BPM
BACTERIA UR QL AUTO: ABNORMAL /HPF
BASOPHILS # BLD AUTO: 0.05 THOUSANDS/ΜL (ref 0–0.1)
BASOPHILS NFR BLD AUTO: 0 % (ref 0–1)
BILIRUB SERPL-MCNC: 0.4 MG/DL (ref 0.2–1)
BILIRUB UR QL STRIP: NEGATIVE
BLD GP AB SCN SERPL QL: NEGATIVE
BUN SERPL-MCNC: 25 MG/DL (ref 5–25)
CALCIUM SERPL-MCNC: 8.8 MG/DL (ref 8.3–10.1)
CHLORIDE SERPL-SCNC: 108 MMOL/L (ref 100–108)
CLARITY UR: ABNORMAL
CO2 SERPL-SCNC: 20 MMOL/L (ref 21–32)
COLOR UR: YELLOW
CREAT SERPL-MCNC: 1.31 MG/DL (ref 0.6–1.3)
EOSINOPHIL # BLD AUTO: 0.2 THOUSAND/ΜL (ref 0–0.61)
EOSINOPHIL NFR BLD AUTO: 1 % (ref 0–6)
ERYTHROCYTE [DISTWIDTH] IN BLOOD BY AUTOMATED COUNT: 14.7 % (ref 11.6–15.1)
GFR SERPL CREATININE-BSD FRML MDRD: 36 ML/MIN/1.73SQ M
GLUCOSE SERPL-MCNC: 189 MG/DL (ref 65–140)
GLUCOSE UR STRIP-MCNC: NEGATIVE MG/DL
HCT VFR BLD AUTO: 39 % (ref 34.8–46.1)
HGB BLD-MCNC: 11.7 G/DL (ref 11.5–15.4)
HGB UR QL STRIP.AUTO: ABNORMAL
HYALINE CASTS #/AREA URNS LPF: ABNORMAL /LPF
IMM GRANULOCYTES # BLD AUTO: 0.1 THOUSAND/UL (ref 0–0.2)
IMM GRANULOCYTES NFR BLD AUTO: 1 % (ref 0–2)
INR PPP: 1.04 (ref 0.91–1.09)
KETONES UR STRIP-MCNC: ABNORMAL MG/DL
LACTATE SERPL-SCNC: 2.2 MMOL/L (ref 0.5–2)
LACTATE SERPL-SCNC: 2.8 MMOL/L (ref 0.5–2)
LACTATE SERPL-SCNC: 2.9 MMOL/L (ref 0.5–2)
LACTATE SERPL-SCNC: 3 MMOL/L (ref 0.5–2)
LACTATE SERPL-SCNC: 3.1 MMOL/L (ref 0.5–2)
LACTATE SERPL-SCNC: 4.1 MMOL/L (ref 0.5–2)
LEUKOCYTE ESTERASE UR QL STRIP: ABNORMAL
LYMPHOCYTES # BLD AUTO: 2.9 THOUSANDS/ΜL (ref 0.6–4.47)
LYMPHOCYTES NFR BLD AUTO: 17 % (ref 14–44)
MCH RBC QN AUTO: 29.7 PG (ref 26.8–34.3)
MCHC RBC AUTO-ENTMCNC: 30 G/DL (ref 31.4–37.4)
MCV RBC AUTO: 99 FL (ref 82–98)
MONOCYTES # BLD AUTO: 0.76 THOUSAND/ΜL (ref 0.17–1.22)
MONOCYTES NFR BLD AUTO: 4 % (ref 4–12)
NEUTROPHILS # BLD AUTO: 13.26 THOUSANDS/ΜL (ref 1.85–7.62)
NEUTS SEG NFR BLD AUTO: 77 % (ref 43–75)
NITRITE UR QL STRIP: POSITIVE
NON-SQ EPI CELLS URNS QL MICRO: ABNORMAL /HPF
NRBC BLD AUTO-RTO: 0 /100 WBCS
NT-PROBNP SERPL-MCNC: 6249 PG/ML
P AXIS: 46 DEGREES
PH UR STRIP.AUTO: 5.5 [PH]
PLATELET # BLD AUTO: 182 THOUSANDS/UL (ref 149–390)
PMV BLD AUTO: 10.4 FL (ref 8.9–12.7)
POTASSIUM SERPL-SCNC: 3.8 MMOL/L (ref 3.5–5.3)
PR INTERVAL: 206 MS
PROT SERPL-MCNC: 7.1 G/DL (ref 6.4–8.2)
PROT UR STRIP-MCNC: ABNORMAL MG/DL
PROTHROMBIN TIME: 11.1 SECONDS (ref 9.8–12)
QRS AXIS: -22 DEGREES
QRSD INTERVAL: 80 MS
QT INTERVAL: 348 MS
QTC INTERVAL: 459 MS
RBC # BLD AUTO: 3.94 MILLION/UL (ref 3.81–5.12)
RBC #/AREA URNS AUTO: ABNORMAL /HPF
RH BLD: POSITIVE
SODIUM SERPL-SCNC: 141 MMOL/L (ref 136–145)
SP GR UR STRIP.AUTO: >=1.03 (ref 1–1.03)
SPECIMEN EXPIRATION DATE: NORMAL
T WAVE AXIS: -29 DEGREES
TROPONIN I SERPL-MCNC: 0.45 NG/ML
TROPONIN I SERPL-MCNC: 0.45 NG/ML
TROPONIN I SERPL-MCNC: 0.47 NG/ML
TROPONIN I SERPL-MCNC: 0.54 NG/ML
UROBILINOGEN UR QL STRIP.AUTO: 0.2 E.U./DL
VENTRICULAR RATE: 105 BPM
WBC # BLD AUTO: 17.27 THOUSAND/UL (ref 4.31–10.16)
WBC #/AREA URNS AUTO: ABNORMAL /HPF

## 2019-10-09 PROCEDURE — 85025 COMPLETE CBC W/AUTO DIFF WBC: CPT | Performed by: EMERGENCY MEDICINE

## 2019-10-09 PROCEDURE — 90662 IIV NO PRSV INCREASED AG IM: CPT | Performed by: FAMILY MEDICINE

## 2019-10-09 PROCEDURE — 93010 ELECTROCARDIOGRAM REPORT: CPT | Performed by: INTERNAL MEDICINE

## 2019-10-09 PROCEDURE — 84484 ASSAY OF TROPONIN QUANT: CPT | Performed by: EMERGENCY MEDICINE

## 2019-10-09 PROCEDURE — 86901 BLOOD TYPING SEROLOGIC RH(D): CPT | Performed by: EMERGENCY MEDICINE

## 2019-10-09 PROCEDURE — 96361 HYDRATE IV INFUSION ADD-ON: CPT

## 2019-10-09 PROCEDURE — 71045 X-RAY EXAM CHEST 1 VIEW: CPT

## 2019-10-09 PROCEDURE — 81001 URINALYSIS AUTO W/SCOPE: CPT | Performed by: EMERGENCY MEDICINE

## 2019-10-09 PROCEDURE — 99222 1ST HOSP IP/OBS MODERATE 55: CPT | Performed by: INTERNAL MEDICINE

## 2019-10-09 PROCEDURE — 93005 ELECTROCARDIOGRAM TRACING: CPT

## 2019-10-09 PROCEDURE — 84484 ASSAY OF TROPONIN QUANT: CPT | Performed by: FAMILY MEDICINE

## 2019-10-09 PROCEDURE — 87086 URINE CULTURE/COLONY COUNT: CPT | Performed by: EMERGENCY MEDICINE

## 2019-10-09 PROCEDURE — 99285 EMERGENCY DEPT VISIT HI MDM: CPT

## 2019-10-09 PROCEDURE — 84484 ASSAY OF TROPONIN QUANT: CPT | Performed by: NURSE PRACTITIONER

## 2019-10-09 PROCEDURE — 87077 CULTURE AEROBIC IDENTIFY: CPT | Performed by: EMERGENCY MEDICINE

## 2019-10-09 PROCEDURE — 96365 THER/PROPH/DIAG IV INF INIT: CPT

## 2019-10-09 PROCEDURE — 83880 ASSAY OF NATRIURETIC PEPTIDE: CPT | Performed by: EMERGENCY MEDICINE

## 2019-10-09 PROCEDURE — 85610 PROTHROMBIN TIME: CPT | Performed by: EMERGENCY MEDICINE

## 2019-10-09 PROCEDURE — 86850 RBC ANTIBODY SCREEN: CPT | Performed by: EMERGENCY MEDICINE

## 2019-10-09 PROCEDURE — 36415 COLL VENOUS BLD VENIPUNCTURE: CPT | Performed by: EMERGENCY MEDICINE

## 2019-10-09 PROCEDURE — 83605 ASSAY OF LACTIC ACID: CPT | Performed by: FAMILY MEDICINE

## 2019-10-09 PROCEDURE — 85730 THROMBOPLASTIN TIME PARTIAL: CPT | Performed by: EMERGENCY MEDICINE

## 2019-10-09 PROCEDURE — 83605 ASSAY OF LACTIC ACID: CPT | Performed by: EMERGENCY MEDICINE

## 2019-10-09 PROCEDURE — 80053 COMPREHEN METABOLIC PANEL: CPT | Performed by: EMERGENCY MEDICINE

## 2019-10-09 PROCEDURE — 87186 SC STD MICRODIL/AGAR DIL: CPT | Performed by: EMERGENCY MEDICINE

## 2019-10-09 PROCEDURE — 87040 BLOOD CULTURE FOR BACTERIA: CPT | Performed by: EMERGENCY MEDICINE

## 2019-10-09 PROCEDURE — 99223 1ST HOSP IP/OBS HIGH 75: CPT | Performed by: FAMILY MEDICINE

## 2019-10-09 PROCEDURE — 71250 CT THORAX DX C-: CPT

## 2019-10-09 PROCEDURE — G0008 ADMIN INFLUENZA VIRUS VAC: HCPCS | Performed by: FAMILY MEDICINE

## 2019-10-09 PROCEDURE — 86900 BLOOD TYPING SEROLOGIC ABO: CPT | Performed by: EMERGENCY MEDICINE

## 2019-10-09 PROCEDURE — 1123F ACP DISCUSS/DSCN MKR DOCD: CPT | Performed by: PHYSICIAN ASSISTANT

## 2019-10-09 PROCEDURE — 87081 CULTURE SCREEN ONLY: CPT | Performed by: FAMILY MEDICINE

## 2019-10-09 PROCEDURE — 74176 CT ABD & PELVIS W/O CONTRAST: CPT

## 2019-10-09 RX ORDER — PANTOPRAZOLE SODIUM 40 MG/1
40 TABLET, DELAYED RELEASE ORAL
Status: DISCONTINUED | OUTPATIENT
Start: 2019-10-10 | End: 2019-10-10

## 2019-10-09 RX ORDER — ACETAMINOPHEN 325 MG/1
650 TABLET ORAL EVERY 6 HOURS PRN
Status: DISCONTINUED | OUTPATIENT
Start: 2019-10-09 | End: 2019-10-09

## 2019-10-09 RX ORDER — MEMANTINE HYDROCHLORIDE 10 MG/1
10 TABLET ORAL DAILY
Status: DISCONTINUED | OUTPATIENT
Start: 2019-10-10 | End: 2019-10-09 | Stop reason: SDUPTHER

## 2019-10-09 RX ORDER — ONDANSETRON 2 MG/ML
4 INJECTION INTRAMUSCULAR; INTRAVENOUS EVERY 6 HOURS PRN
Status: DISCONTINUED | OUTPATIENT
Start: 2019-10-09 | End: 2019-10-15 | Stop reason: HOSPADM

## 2019-10-09 RX ORDER — ACETAMINOPHEN 325 MG/1
650 TABLET ORAL EVERY 6 HOURS PRN
Status: DISCONTINUED | OUTPATIENT
Start: 2019-10-09 | End: 2019-10-15 | Stop reason: HOSPADM

## 2019-10-09 RX ORDER — IBUPROFEN 200 MG
400 TABLET ORAL DAILY
Status: ON HOLD | COMMUNITY
End: 2019-10-09

## 2019-10-09 RX ORDER — TAMOXIFEN CITRATE 10 MG/1
20 TABLET ORAL DAILY
Status: DISCONTINUED | OUTPATIENT
Start: 2019-10-10 | End: 2019-10-12

## 2019-10-09 RX ORDER — ASPIRIN 325 MG
325 TABLET ORAL ONCE
Status: COMPLETED | OUTPATIENT
Start: 2019-10-09 | End: 2019-10-09

## 2019-10-09 RX ORDER — CEFTRIAXONE 1 G/50ML
1000 INJECTION, SOLUTION INTRAVENOUS EVERY 24 HOURS
Status: DISCONTINUED | OUTPATIENT
Start: 2019-10-09 | End: 2019-10-10

## 2019-10-09 RX ORDER — BRIMONIDINE TARTRATE, TIMOLOL MALEATE 2; 5 MG/ML; MG/ML
1 SOLUTION/ DROPS OPHTHALMIC DAILY
Status: DISCONTINUED | OUTPATIENT
Start: 2019-10-10 | End: 2019-10-15 | Stop reason: HOSPADM

## 2019-10-09 RX ORDER — LORAZEPAM 0.5 MG/1
0.5 TABLET ORAL EVERY 8 HOURS PRN
Status: DISCONTINUED | OUTPATIENT
Start: 2019-10-09 | End: 2019-10-10

## 2019-10-09 RX ORDER — SODIUM CHLORIDE 9 MG/ML
75 INJECTION, SOLUTION INTRAVENOUS CONTINUOUS
Status: DISCONTINUED | OUTPATIENT
Start: 2019-10-09 | End: 2019-10-10

## 2019-10-09 RX ORDER — HEPARIN SODIUM 5000 [USP'U]/ML
5000 INJECTION, SOLUTION INTRAVENOUS; SUBCUTANEOUS EVERY 8 HOURS SCHEDULED
Status: DISCONTINUED | OUTPATIENT
Start: 2019-10-09 | End: 2019-10-09

## 2019-10-09 RX ORDER — DONEPEZIL HYDROCHLORIDE 5 MG/1
5 TABLET, FILM COATED ORAL
Status: DISCONTINUED | OUTPATIENT
Start: 2019-10-09 | End: 2019-10-12

## 2019-10-09 RX ORDER — MEMANTINE HYDROCHLORIDE 10 MG/1
5 TABLET ORAL DAILY
Status: DISCONTINUED | OUTPATIENT
Start: 2019-10-10 | End: 2019-10-12

## 2019-10-09 RX ORDER — HEPARIN SODIUM 5000 [USP'U]/ML
5000 INJECTION, SOLUTION INTRAVENOUS; SUBCUTANEOUS EVERY 8 HOURS SCHEDULED
Status: DISCONTINUED | OUTPATIENT
Start: 2019-10-09 | End: 2019-10-10

## 2019-10-09 RX ORDER — MULTIVITAMIN
1 TABLET ORAL DAILY
COMMUNITY

## 2019-10-09 RX ORDER — ASPIRIN 81 MG/1
81 TABLET ORAL DAILY
Status: DISCONTINUED | OUTPATIENT
Start: 2019-10-10 | End: 2019-10-12

## 2019-10-09 RX ORDER — LISINOPRIL 20 MG/1
20 TABLET ORAL EVERY MORNING
Status: DISCONTINUED | OUTPATIENT
Start: 2019-10-10 | End: 2019-10-10

## 2019-10-09 RX ADMIN — SODIUM CHLORIDE 1000 ML: 0.9 INJECTION, SOLUTION INTRAVENOUS at 20:55

## 2019-10-09 RX ADMIN — ONDANSETRON 4 MG: 2 INJECTION INTRAMUSCULAR; INTRAVENOUS at 21:50

## 2019-10-09 RX ADMIN — ASPIRIN 325 MG: 325 TABLET, FILM COATED ORAL at 17:55

## 2019-10-09 RX ADMIN — DONEPEZIL HYDROCHLORIDE 5 MG: 5 TABLET ORAL at 21:42

## 2019-10-09 RX ADMIN — ACETAMINOPHEN 650 MG: 325 TABLET, FILM COATED ORAL at 21:40

## 2019-10-09 RX ADMIN — CEFEPIME HYDROCHLORIDE 2000 MG: 2 INJECTION, SOLUTION INTRAVENOUS at 10:58

## 2019-10-09 RX ADMIN — SODIUM CHLORIDE 1000 ML: 0.9 INJECTION, SOLUTION INTRAVENOUS at 23:57

## 2019-10-09 RX ADMIN — HEPARIN SODIUM 5000 UNITS: 5000 INJECTION INTRAVENOUS; SUBCUTANEOUS at 21:55

## 2019-10-09 RX ADMIN — CEFTRIAXONE 1000 MG: 1 INJECTION, SOLUTION INTRAVENOUS at 16:31

## 2019-10-09 RX ADMIN — INFLUENZA A VIRUS A/MICHIGAN/45/2015 X-275 (H1N1) ANTIGEN (FORMALDEHYDE INACTIVATED), INFLUENZA A VIRUS A/SINGAPORE/INFIMH-16-0019/2016 IVR-186 (H3N2) ANTIGEN (FORMALDEHYDE INACTIVATED), AND INFLUENZA B VIRUS B/MARYLAND/15/2016 BX-69A (A B/COLORADO/6/2017-LIKE VIRUS) ANTIGEN (FORMALDEHYDE INACTIVATED) 0.5 ML: 60; 60; 60 INJECTION, SUSPENSION INTRAMUSCULAR at 13:44

## 2019-10-09 RX ADMIN — SODIUM CHLORIDE 75 ML/HR: 0.9 INJECTION, SOLUTION INTRAVENOUS at 14:43

## 2019-10-09 RX ADMIN — SODIUM CHLORIDE 1000 ML: 0.9 INJECTION, SOLUTION INTRAVENOUS at 18:27

## 2019-10-09 RX ADMIN — SODIUM CHLORIDE 800 ML: 0.9 INJECTION, SOLUTION INTRAVENOUS at 12:05

## 2019-10-09 RX ADMIN — METOPROLOL TARTRATE 25 MG: 25 TABLET, FILM COATED ORAL at 21:42

## 2019-10-09 RX ADMIN — SODIUM CHLORIDE 1000 ML: 0.9 INJECTION, SOLUTION INTRAVENOUS at 10:08

## 2019-10-09 NOTE — ASSESSMENT & PLAN NOTE
As noted by leukocytosis, tachycardia, lactic acidosis with lactic acid of 4 1 with source of infection being acute cystitis  Patient received 30 mL/kg fluid bolus in the ER  Patient had lactic acidosis despite multiple fluid boluses which is most likely from her liver dysfunction/lesions   Lactic acid normalized  blood cultures negative so far, urine culture growing greater than 100,000 E coli  CT chest, abdomen/pelvis was negative for any infectious etiology  Due to O2 episodes of hypotension, patient was transferred to the ICU and was started on epinephrine which has since been discontinued Tarsorrhaphy Text: A tarsorrhaphy was performed using Frost sutures.

## 2019-10-09 NOTE — ASSESSMENT & PLAN NOTE
Most likely due to severe sepsis, cystitis, acute PE, deconditioning  PT/OT  Patient will be discharged to short-term rehabilitation

## 2019-10-09 NOTE — ASSESSMENT & PLAN NOTE
Patient is status post right mastectomy    She was on tamoxifen which was discontinued  Tamoxifen  was discontinued after discussion with Oncology

## 2019-10-09 NOTE — ED NOTES
Patient not willing to answer any questions  Patient's daughter at bedside answering questions for patient  When daughter was out of room patient reports feeling safe at home states "I just don't want to talk to you smartass!" Patient resting comfortably in bed, 2L o2 placed via NC, warm blankets placed, tre bell in reach  Will continue to monitor        Clarice Huerta RN  10/09/19 5300

## 2019-10-09 NOTE — PLAN OF CARE
Problem: Potential for Falls  Goal: Patient will remain free of falls  Description  INTERVENTIONS:  - Assess patient frequently for physical needs  -  Identify cognitive and physical deficits and behaviors that affect risk of falls  -  [place] fall precautions as indicated by assessment   - Educate patient/family on patient safety including physical limitations  - Instruct patient to call for assistance with activity based on assessment  - Modify environment to reduce risk of injury  - Consider OT/PT consult to assist with strengthening/mobility  Outcome: Progressing     Problem: Nutrition/Hydration-ADULT  Goal: Nutrient/Hydration intake appropriate for improving, restoring or maintaining nutritional needs  Description  Monitor and assess patient's nutrition/hydration status for malnutrition  Collaborate with interdisciplinary team and initiate plan and interventions as ordered  Monitor patient's weight and dietary intake as ordered or per policy  Utilize nutrition screening tool and intervene as necessary  Determine patient's food preferences and provide high-protein, high-caloric foods as appropriate       INTERVENTIONS:  - Monitor oral intake, urinary output, labs, and treatment plans  - Assess nutrition and hydration status and recommend course of action  - Evaluate amount of meals eaten  - Assist patient with eating if necessary   - Allow adequate time for meals  - Recommend/ encourage appropriate diets, oral nutritional supplements, and vitamin/mineral supplements  - Order, calculate, and assess calorie counts as needed  - Recommend, monitor, and adjust tube feedings and TPN/PPN based on assessed needs  - Assess need for intravenous fluids  - Provide specific nutrition/hydration education as appropriate  - Include patient/family/caregiver in decisions related to nutrition  Outcome: Progressing     Problem: Prexisting or High Potential for Compromised Skin Integrity  Goal: Skin integrity is maintained or improved  Description  INTERVENTIONS:  - Identify patients at risk for skin breakdown  - Assess and monitor skin integrity  - Assess and monitor nutrition and hydration status  - Monitor labs   - Assess for incontinence   - Turn and reposition patient  - Assist with mobility/ambulation  - Relieve pressure over bony prominences  - Avoid friction and shearing  - Provide appropriate hygiene as needed including keeping skin clean and dry  - Evaluate need for skin moisturizer/barrier cream  - Collaborate with interdisciplinary team   - Patient/family teaching  - Consider wound care consult   Outcome: Progressing     Problem: PAIN - ADULT  Goal: Verbalizes/displays adequate comfort level or baseline comfort level  Description  Interventions:  - Encourage patient to monitor pain and request assistance  - Assess pain using appropriate pain scale  - Administer analgesics based on type and severity of pain and evaluate response  - Implement non-pharmacological measures as appropriate and evaluate response  - Consider cultural and social influences on pain and pain management  - Notify physician/advanced practitioner if interventions unsuccessful or patient reports new pain  Outcome: Progressing     Problem: INFECTION - ADULT  Goal: Absence or prevention of progression during hospitalization  Description  INTERVENTIONS:  - Assess and monitor for signs and symptoms of infection  - Monitor lab/diagnostic results  - Monitor all insertion sites, i e  indwelling lines, tubes, and drains  - Monitor endotracheal if appropriate and nasal secretions for changes in amount and color  - [place] appropriate cooling/warming therapies per order  - Administer medications as ordered  - Instruct and encourage patient and family to use good hand hygiene technique  - Identify and instruct in appropriate isolation precautions for identified infection/condition  Outcome: Progressing  Goal: Absence of fever/infection during neutropenic period  Description  INTERVENTIONS:  - Monitor WBC    Outcome: Progressing     Problem: SAFETY ADULT  Goal: Maintain or return to baseline ADL function  Description  INTERVENTIONS:  -  Assess patient's ability to carry out ADLs; assess patient's baseline for ADL function and identify physical deficits which impact ability to perform ADLs (bathing, care of mouth/teeth, toileting, grooming, dressing, etc )  - Assess/evaluate cause of self-care deficits   - Assess range of motion  - Assess patient's mobility; develop plan if impaired  - Assess patient's need for assistive devices and provide as appropriate  - Encourage maximum independence but intervene and supervise when necessary  - Involve family in performance of ADLs  - Assess for home care needs following discharge   - Consider OT consult to assist with ADL evaluation and planning for discharge  - Provide patient education as appropriate  Outcome: Progressing  Goal: Maintain or return mobility status to optimal level  Description  INTERVENTIONS:  - Assess patient's baseline mobility status (ambulation, transfers, stairs, etc )    - Identify cognitive and physical deficits and behaviors that affect mobility  - Identify mobility aids required to assist with transfers and/or ambulation (gait belt, sit-to-stand, lift, walker, cane, etc )  - [place] fall precautions as indicated by assessment  - Record patient progress and toleration of activity level on Mobility SBAR; progress patient to next Phase/Stage  - Instruct patient to call for assistance with activity based on assessment  - Consider rehabilitation consult to assist with strengthening/weightbearing, etc   Outcome: Progressing     Problem: DISCHARGE PLANNING  Goal: Discharge to home or other facility with appropriate resources  Description  INTERVENTIONS:  - Identify barriers to discharge w/patient and caregiver  - Arrange for needed discharge resources and transportation as appropriate  - Identify discharge learning needs (meds, wound care, etc )  - Arrange for interpretive services to assist at discharge as needed  - Refer to Case Management Department for coordinating discharge planning if the patient needs post-hospital services based on physician/advanced practitioner order or complex needs related to functional status, cognitive ability, or social support system  Outcome: Progressing     Problem: Knowledge Deficit  Goal: Patient/family/caregiver demonstrates understanding of disease process, treatment plan, medications, and discharge instructions  Description  Complete learning assessment and assess knowledge base    Interventions:  - Provide teaching at level of understanding  - Provide teaching via preferred learning methods  Outcome: Progressing     Problem: CARDIOVASCULAR - ADULT  Goal: Maintains optimal cardiac output and hemodynamic stability  Description  INTERVENTIONS:  - Monitor I/O, vital signs and rhythm  - Monitor for S/S and trends of decreased cardiac output  - Administer and titrate ordered vasoactive medications to optimize hemodynamic stability  - Assess quality of pulses, skin color and temperature  - Assess for signs of decreased coronary artery perfusion  - Instruct patient to report change in severity of symptoms  Outcome: Progressing  Goal: Absence of cardiac dysrhythmias or at baseline rhythm  Description  INTERVENTIONS:  - Continuous cardiac monitoring, vital signs, obtain 12 lead EKG if ordered  - Administer antiarrhythmic and heart rate control medications as ordered  - Monitor electrolytes and administer replacement therapy as ordered  Outcome: Progressing     Problem: GENITOURINARY - ADULT  Goal: Maintains or returns to baseline urinary function  Description  INTERVENTIONS:  - Assess urinary function  - Encourage oral fluids to ensure adequate hydration if ordered  - Administer IV fluids as ordered to ensure adequate hydration  - Administer ordered medications as needed  - Offer frequent toileting  - Follow urinary retention protocol if ordered  Outcome: Progressing  Goal: Absence of urinary retention  Description  INTERVENTIONS:  - Assess patients ability to void and empty bladder  - Monitor I/O  - Bladder scan as needed  - Discuss with physician/AP medications to alleviate retention as needed  - Discuss catheterization for long term situations as appropriate  Outcome: Progressing  Goal: Urinary catheter remains patent  Description  INTERVENTIONS:  - Assess patency of urinary catheter  - If patient has a chronic diaz, consider changing catheter if non-functioning  - Follow guidelines for intermittent irrigation of non-functioning urinary catheter  Outcome: Progressing

## 2019-10-09 NOTE — CONSULTS
Consultation - Cardiology   Chase Ríos 80 y o  female MRN: [id number]  Unit/Bed#: [address] [place] [address] Encounter: [id number]    Assessment/Plan     Assessment:  1  MI type 2 secondary to sepsis and volume overload  2  UTI  3  Dementia with behavioral disturbances, Alzheimer's  4  History of right sided breast cancer with mastectomy  5  Abnormal CT scan of the liver with masses concerning for metastasis  6  Hypertension    Plan:  Patient has been admitted to the hospitalist service  1  Will obtain 2D echocardiogram to evaluate cardiac function, structure and wall motion  2  Continue trend troponins until they peak  3  Will continue low-dose beta-blocker with Lopressor 25 mg q 12 hours with parameters  Continue home dose of Lisinopril  Monitor VS and labs  4  Will start weight based Lovenox, due to patient's renal function she will receive 60 mg subcutaneously once a day  5  Load patient with aspirin 325 mg x1 and then continue aspirin 81 mg once a day  6  Protonix 400 mg p o  Once a day for GI prophylaxis  7  Consult Psychiatry due to patient's dementia with behavioral disturbances for patient's safety and concern for progression to delirium  8  Will need to make contact with patient's family regarding direction of further care especially regarding stress testing or cardiac catheterization  9  Spoke with the hospitalist regarding abnormal CT and concern for metastasis  History of Present Illness   Physician Requesting Consult: Johnny Dakin, DO  Reason for Consult / Principal Problem:  Elevated troponins    HPI: Chase Ríos is a 80y o  year old female who presented to the emergency room with complaints of diarrhea, weakness and shortness breath x1 week  Patient has a history of Alzheimer's dementia and refused to provide history  History is obtained from patient's chart and from her family  Per her daughter patient has been having weakness, diarrhea and shortness of breath x1 week    She was unable to get out of the shower this morning  Family decided to bring her in for evaluation  Daughter states mother has not been complaining of any pain  Family has left the emergency room and attempts to reach him by phone have been unsuccessful  Patient is seen and examined, she is easily agitated asking where her family is  Explained to her that she has been admitted to the hospital for evaluation of urinary tract infection an abnormal lab work and she became quite upset that her family left her here  Patient became tachypneic and very anxious  As further questioning of all the patient became more agitated  Patient states no to questions regarding review of systems  Admission labs demonstrate a white count of 7 2, NT proBNP is 6249, 1st troponin was 0 45 and 2nd troponin was 0 47  Twelve lead EKG is concerning for anterior lateral ischemia  Patient had a CT scan of the abdomen and pelvis which was demonstrated liver masses concerning for metastasis, small ascending thoracic aortic aneurysm measured at 4 2 cm and history of vasectomy  Patient was diagnosed with breast cancer in 2016 and underwent mastectomy  She follows periodically with Dr Dale Lang from Oncology  He notes in his office records at patient, due to her dementia, is sometimes difficult for family to bring in to the office  She had been refusing routine follow-up, medications and diagnostic testing      Inpatient consult to Cardiology  Consult performed by: RUTH Velasquez  Consult ordered by: Siobhan Baker DO          Review of Systems   Unable to perform ROS: Dementia (States no to questions regarding review of systems)       Historical Information   Past Medical History:   Diagnosis Date    Arthritis     Dementia ([facility] [address] )     Hypertension     Incontinent of urine     Varicose veins of legs      Past Surgical History:   Procedure Laterality Date    BREAST EXCISIONAL BIOPSY Right     CATARACT EXTRACTION W/ INTRAOCULAR LENS IMPLANT Right 01/05/2016    EYE SURGERY      lazy eye    MASTECTOMY      MODIFIED RADICAL MASTECTOMY W/ AXILLARY LYMPH NODE DISSECTION Right 6/15/2016    Procedure: MASTECTOMY MODIFIED RADICAL (WITHOUT SENTINEL NODE BIOPSY); Surgeon: Hemanth Ulloa MD;  Location: [facility];  Service:     OOPHORECTOMY Right     1965     [facility] CATARACT EXTRACAP,INSERT LENS Left 2/23/2016    Procedure: EXTRACTION EXTRACAPSULAR CATARACT PHACO INTRAOCULAR LENS (IOL); Surgeon: Leyda Barlow MD;  Location: [facility] MAIN OR;  Service: Ophthalmology    TONSILLECTOMY  [address] [address] BREAST BIOPSY RIGHT COMPLETE Right 5/3/2016     Social History     Substance and Sexual Activity   Alcohol Use Not Currently    Comment: socially     Social History     Substance and Sexual Activity   Drug Use No     Social History     Tobacco Use   Smoking Status Never Smoker   Smokeless Tobacco Never Used     Family History: History reviewed  No pertinent family history  Meds/Allergies   all current active meds have been reviewed, current meds:   Current Facility-Administered Medications   Medication Dose Route Frequency    [START ON 10/10/2019] aspirin (ECOTRIN LOW STRENGTH) EC tablet 81 mg  81 mg Oral Daily    aspirin tablet 325 mg  325 mg Oral Once    metoprolol tartrate (LOPRESSOR) tablet 25 mg  25 mg Oral Q12H [address]    [START ON 10/10/2019] pantoprazole (PROTONIX) EC tablet 40 mg  40 mg Oral Early Morning    pneumococcal 13-valent conjugate vaccine (PREVNAR-13) IM injection 0 5 mL  0 5 mL Intramuscular Prior to discharge    sodium chloride 0 9 % infusion  75 mL/hr Intravenous Continuous    and PTA meds:   Prior to Admission Medications   Prescriptions Last Dose Informant Patient Reported? Taking?    COMBIGAN 0 2-0 5 % 10/8/2019 at Unknown time Child Yes Yes   LORazepam (ATIVAN) 0 5 mg tablet Not Taking at Unknown time Child No No   Sig: Take 1 tablet (0 5 mg total) by mouth every 8 (eight) hours as needed for anxiety Patient not taking: Reported on 10/9/2019   Memantine HCl ER (NAMENDA XR) 7 MG CP24 Not Taking at Unknown time Child Yes No   Sig: Take 1 tablet by mouth every morning  Multiple Vitamin (MULTIVITAMIN) tablet 10/8/2019 at Unknown time  Yes Yes   Sig: Take 1 tablet by mouth daily   aspirin 81 MG tablet 10/8/2019 at Unknown time Child Yes Yes   Sig: Take 81 mg by mouth every morning  donepezil (ARICEPT) 5 mg tablet 10/8/2019 at Unknown time Child No Yes   Sig: Take 1 tablet (5 mg total) by mouth daily at bedtime   fluocinolone (SYNALAR) 0 025 % cream Not Taking at Unknown time Child Yes No   ibuprofen (MOTRIN) 100 mg/5 mL suspension Not Taking at Unknown time  Yes No   Sig: Take 200 mg by mouth every 4 (four) hours as needed for mild pain   ibuprofen (MOTRIN) 200 mg tablet 10/8/2019 at Unknown time  Yes Yes   Sig: Take 400 mg by mouth daily   lisinopril (ZESTRIL) 20 mg tablet Not Taking at Unknown time Child Yes No   Sig: Take 20 mg by mouth every morning  memantine (NAMENDA) 10 mg tablet 10/8/2019 at Unknown time Child Yes Yes   metoprolol succinate (TOPROL-XL) 25 mg 24 hr tablet 10/8/2019 at Unknown time Child Yes Yes   Sig: Take 12 5 mg by mouth    metoprolol tartrate (LOPRESSOR) 25 mg tablet Not Taking at Unknown time Child Yes No   Sig: Take 25 mg by mouth every morning  tamoxifen (NOLVADEX) 20 mg tablet 10/8/2019 at Unknown time Child No Yes   Sig: Take 1 tablet (20 mg total) by mouth daily      Facility-Administered Medications: None     No Known Allergies    Objective   Vitals: Blood pressure 136/89, pulse 97, temperature 98 °F (36 7 °C), temperature source Temporal, resp  rate (!) 24, height 5' 2" (1 575 m), weight 58 9 kg (129 lb 14 4 oz), SpO2 93 %, not currently breastfeeding    Orthostatic Blood Pressures      Most Recent Value   Blood Pressure  136/89 filed at 10/09/2019 1307   Patient Position - Orthostatic VS  Lying filed at 10/09/2019 1307            Intake/Output Summary (Last 24 hours) at 10/9/2019 1524  Last data filed at 10/9/2019 1200  Gross per 24 hour   Intake 1050 ml   Output    Net 1050 ml       Invasive Devices     Peripheral Intravenous Line            Peripheral IV 01/11/18 Left Antecubital 635 days    Peripheral IV 10/09/19 Left Antecubital less than 1 day                Physical Exam   Constitutional: She appears well-developed and well-nourished  No distress  HENT:   Head: Normocephalic and atraumatic  Right Ear: External ear normal    Left Ear: External ear normal    Eyes: Pupils are equal, round, and reactive to light  Conjunctivae are normal  Right eye exhibits no discharge  Left eye exhibits no discharge  No scleral icterus  Neck: Normal range of motion  Neck supple  No JVD present  No thyromegaly present  Cardiovascular: Regular rhythm, intact distal pulses and normal pulses  No extrasystoles are present  Tachycardia present  Murmur heard  Systolic murmur is present with a grade of 1/6  Pulmonary/Chest: Effort normal  Tachypnea noted  No respiratory distress  She has no rhonchi  She has rales in the right lower field and the left lower field  Abdominal: Soft  Bowel sounds are normal  She exhibits no distension and no mass  Neurological: She is alert  Skin: Skin is warm and dry  Capillary refill takes less than 2 seconds  She is not diaphoretic  Psychiatric: Her mood appears anxious  She is agitated  Thought content is paranoid  Cognition and memory are impaired  She expresses impulsivity and inappropriate judgment  Nursing note and vitals reviewed  Lab Results:   I have personally reviewed pertinent lab results      CBC with diff:   Results from last 7 days   Lab Units 10/09/19  0951   WBC Thousand/uL 17 27*   RBC Million/uL 3 94   HEMOGLOBIN g/dL 11 7   HEMATOCRIT % 39 0   MCV fL 99*   MCH pg 29 7   MCHC g/dL 30 0*   RDW % 14 7   MPV fL 10 4   PLATELETS Thousands/uL 182     CMP:   Results from last 7 days   Lab Units 10/09/19  0951   SODIUM mmol/L 141 POTASSIUM mmol/L 3 8   CHLORIDE mmol/L 108   CO2 mmol/L 20*   BUN mg/dL 25   CREATININE mg/dL 1 31*   CALCIUM mg/dL 8 8   AST U/L 33   ALT U/L 36   ALK PHOS U/L 49   EGFR ml/min/1 73sq m 36     Troponin:   0   Lab Value Date/Time    TROPONINI 0 47 (H) 10/09/2019 1356    TROPONINI 0 45 (H) 10/09/2019 0951    TROPONINI <0 02 2018 1540     BNP:   Results from last 7 days   Lab Units 10/09/19  0951   POTASSIUM mmol/L 3 8   CHLORIDE mmol/L 108   CO2 mmol/L 20*   BUN mg/dL 25   CREATININE mg/dL 1 31*   CALCIUM mg/dL 8 8   EGFR ml/min/1 73sq m 36     Coags:   Results from last 7 days   Lab Units 10/09/19  0951   PTT seconds 22*   INR  1 04     Imaging: I have personally reviewed pertinent reports  EK lead EKG demonstrates sinus rhythm with ST depression and T-wave inversion seen in the anterior lateral leads    This is changed when compared to EKG from 2018  VTE Prophylaxis: Sequential compression device Lamonte Ernandez     Code Status: Prior  Advance Directive and Living Will:      Power of :    POLST:      Gucci Tucker, [facility]  Cardiology

## 2019-10-09 NOTE — ED PROVIDER NOTES
History  Chief Complaint   Patient presents with    Weakness - Generalized     C/O Gen weakness X1 week   Daughter reports patient could not get out of shower this morning so EMS was called   Diarrhea     C/O diarrhea on off X 1 week  No recent antibiotic treatment      HPI    This is an 80-year-old female that presents today with generalized weakness and diarrhea  Patient has history of Alzheimer's disease, hypertension and for the past week has been feeling weak  Patient refuses to provide history  She is upset that she is in the hospital   Unable to obtain any history from the patient  History obtained from daughter at bedside  Daughter states that today she could not get out of the shower with diffuse weakness  States though she has been decompensating  States the patient is not stating any pain  States noticed that she she is having labored breathing  No cough no fevers did have diarrhea for a week  No recent antibiotics or hospitalization  Patient has no recent falls  No increased confusion  80-year-old female that presents to generalized weakness and diarrhea  Will get the labs and images as necessary  Patient currently tachycardic and tachypneic will do a septic workup  Patient does appear to be slightly short of breath  Prior to Admission Medications   Prescriptions Last Dose Informant Patient Reported? Taking? COMBIGAN 0 2-0 5 % 10/8/2019 at Unknown time Child Yes Yes   LORazepam (ATIVAN) 0 5 mg tablet Not Taking at Unknown time Child No No   Sig: Take 1 tablet (0 5 mg total) by mouth every 8 (eight) hours as needed for anxiety   Patient not taking: Reported on 10/9/2019   Memantine HCl ER (NAMENDA XR) 7 MG CP24 Not Taking at Unknown time Child Yes No   Sig: Take 1 tablet by mouth every morning     Multiple Vitamin (MULTIVITAMIN) tablet 10/8/2019 at Unknown time  Yes Yes   Sig: Take 1 tablet by mouth daily   aspirin 81 MG tablet 10/8/2019 at Unknown time Child Yes Yes   Sig: Take 81 mg by mouth every morning  donepezil (ARICEPT) 5 mg tablet 10/8/2019 at Unknown time Child No Yes   Sig: Take 1 tablet (5 mg total) by mouth daily at bedtime   fluocinolone (SYNALAR) 0 025 % cream Not Taking at Unknown time Child Yes No   lisinopril (ZESTRIL) 20 mg tablet Not Taking at Unknown time Child Yes No   Sig: Take 20 mg by mouth every morning  memantine (NAMENDA) 10 mg tablet 10/8/2019 at Unknown time Child Yes Yes   tamoxifen (NOLVADEX) 20 mg tablet 10/8/2019 at Unknown time Child No Yes   Sig: Take 1 tablet (20 mg total) by mouth daily      Facility-Administered Medications: None       Past Medical History:   Diagnosis Date    Arthritis     Dementia ([address] )     Hypertension     Incontinent of urine     Varicose veins of legs        Past Surgical History:   Procedure Laterality Date    BREAST EXCISIONAL BIOPSY Right     CATARACT EXTRACTION W/ INTRAOCULAR LENS IMPLANT Right 01/05/2016    EYE SURGERY      lazy eye    MASTECTOMY      MODIFIED RADICAL MASTECTOMY W/ AXILLARY LYMPH NODE DISSECTION Right 6/15/2016    Procedure: MASTECTOMY MODIFIED RADICAL (WITHOUT SENTINEL NODE BIOPSY); Surgeon: Dillon Ashraf MD;  Location: [address] [place] [address];  Service:     OOPHORECTOMY Right     1965     [facility] CATARACT EXTRACAP,INSERT LENS Left 2/23/2016    Procedure: EXTRACTION EXTRACAPSULAR CATARACT PHACO INTRAOCULAR LENS (IOL); Surgeon: Deirdre Adair MD;  Location: [facility];  Service: Ophthalmology    TONSILLECTOMY  1935    US GUIDED BREAST BIOPSY RIGHT COMPLETE Right 5/3/2016       History reviewed  No pertinent family history  I have reviewed and agree with the history as documented  Social History     Tobacco Use    Smoking status: Never Smoker    Smokeless tobacco: Never Used   Substance Use Topics    Alcohol use: Not Currently     Comment: socially    Drug use: No        Review of Systems   Constitutional: Positive for fatigue  Negative for diaphoresis and fever  HENT: Negative  Respiratory: Positive for shortness of breath  Negative for cough and wheezing  Cardiovascular: Negative  Negative for chest pain, palpitations and leg swelling  Gastrointestinal: Negative for abdominal distention, abdominal pain, nausea and vomiting  Genitourinary: Negative  Musculoskeletal: Negative  Skin: Negative  Neurological: Positive for weakness  Psychiatric/Behavioral: Negative  All other systems reviewed and are negative  Physical Exam  Physical Exam   Constitutional: She is oriented to person, place, and time  She appears well-developed and well-nourished  HENT:   Head: Normocephalic and atraumatic  Eyes: Pupils are equal, round, and reactive to light  EOM are normal    Neck: Normal range of motion  Neck supple  Cardiovascular: Normal rate, regular rhythm and normal heart sounds  No murmur heard  Pulmonary/Chest: Effort normal and breath sounds normal  She has no wheezes  She has no rales  She exhibits no tenderness  Tachypneic   Abdominal: Soft  Bowel sounds are normal  She exhibits no distension  There is no tenderness  There is no rebound and no guarding  Musculoskeletal: Normal range of motion  She exhibits no edema or deformity  Neurological: She is alert and oriented to person, place, and time  Skin: Skin is warm and dry  Capillary refill takes less than 2 seconds  There is pallor  Psychiatric: She has a normal mood and affect  Vitals reviewed        Vital Signs  ED Triage Vitals [10/09/19 0932]   Temperature Pulse Respirations Blood Pressure SpO2   97 6 °F (36 4 °C) 103 (!) 24 152/66 95 %      Temp Source Heart Rate Source Patient Position - Orthostatic VS BP Location FiO2 (%)   Tympanic Monitor Lying Left arm --      Pain Score       No Pain           Vitals:    10/10/19 1315 10/10/19 1623 10/10/19 1648 10/10/19 1948   BP: 104/55 (!) 87/54 92/55 153/82   Pulse: 75 81 78 95   Patient Position - Orthostatic VS: Lying Lying Lying Lying         Visual Acuity      ED Medications  Medications   pneumococcal 13-valent conjugate vaccine (PREVNAR-13) IM injection 0 5 mL (has no administration in time range)   aspirin (ECOTRIN LOW STRENGTH) EC tablet 81 mg (81 mg Oral Given 10/10/19 0919)   pantoprazole (PROTONIX) EC tablet 40 mg (40 mg Oral Given 10/10/19 0548)   donepezil (ARICEPT) tablet 5 mg (5 mg Oral Given 10/9/19 2142)   memantine (NAMENDA) tablet 5 mg (5 mg Oral Given 10/10/19 0920)   LORazepam (ATIVAN) tablet 0 5 mg (has no administration in time range)   multivitamin-minerals (CENTRUM) tablet 1 tablet (1 tablet Oral Given 10/10/19 0920)   tamoxifen (NOLVADEX) tablet 20 mg (20 mg Oral Given 10/10/19 0920)   brimonidine-timolol (COMBIGAN) 0 2-0 5 % ophthalmic solution 1 drop (1 drop Both Eyes Not Given 10/10/19 1239)   ondansetron (ZOFRAN) injection 4 mg (4 mg Intravenous Given 10/10/19 1945)   acetaminophen (TYLENOL) tablet 650 mg (650 mg Oral Given 10/10/19 1307)   cefepime (MAXIPIME) IVPB (premix) 1,000 mg (1,000 mg Intravenous New Bag 10/10/19 1109)   metoprolol tartrate (LOPRESSOR) tablet 25 mg (25 mg Oral Given 10/10/19 1018)   escitalopram (LEXAPRO) tablet 5 mg (5 mg Oral Given 10/10/19 1238)   multi-electrolyte (PLASMALYTE-A/ISOLYTE-S PH 7 4) IV solution (75 mL/hr Intravenous Rate/Dose Change 10/10/19 1601)   sodium chloride 0 9 % bolus 1,000 mL (0 mL Intravenous Stopped 10/9/19 1200)   cefepime (MAXIPIME) 2 g/50 mL dextrose IVPB (0 mg Intravenous Stopped 10/9/19 1135)   sodium chloride 0 9 % bolus 800 mL (0 mL Intravenous Stopped 10/9/19 1344)   influenza vaccine, high-dose (FLUZONE HIGH-DOSE) IM injection RUBEN 0 5 mL (0 5 mL Intramuscular Given 10/9/19 1344)   aspirin tablet 325 mg (325 mg Oral Given 10/9/19 1755)   sodium chloride 0 9 % bolus 1,000 mL (0 mL Intravenous Stopped 10/9/19 1946)   sodium chloride 0 9 % bolus 1,000 mL (0 mL Intravenous Stopped 10/9/19 2250)   sodium chloride 0 9 % bolus 1,000 mL (0 mL Intravenous Stopped 10/10/19 0146) multi-electrolyte (ISOLYTE-S PH 7 4) bolus 500 mL (0 mL Intravenous Stopped 10/10/19 0420)   multi-electrolyte (ISOLYTE-S PH 7 4) bolus 500 mL (0 mL Intravenous Stopped 10/10/19 0645)   acetaminophen (TYLENOL) tablet 650 mg (650 mg Oral Given 10/10/19 0706)   magnesium sulfate 2 g/50 mL IVPB (premix) 2 g (0 g Intravenous Stopped 10/10/19 1526)   enoxaparin (LOVENOX) subcutaneous injection 60 mg (60 mg Subcutaneous Given 10/10/19 1452)   iohexol (OMNIPAQUE) 350 MG/ML injection (MULTI-DOSE) 85 mL (85 mL Intravenous Given 10/10/19 1509)       Diagnostic Studies  Results Reviewed     Procedure Component Value Units Date/Time    Urine culture [[id number]]  (Abnormal) Collected:  10/09/19 1053    Lab Status:  Preliminary result Specimen:  Urine, Other Updated:  10/10/19 1528     Urine Culture >100,000 cfu/ml Escherichia coli    Blood culture #1 [[id number]] Collected:  10/09/19 0951    Lab Status:  Preliminary result Specimen:  Blood from Arm, Left Updated:  10/10/19 1201     Blood Culture No Growth at 24 hrs  Blood culture #2 [[id number]] Collected:  10/09/19 0946    Lab Status:  Preliminary result Specimen:  Blood from Arm, Left Updated:  10/10/19 1201     Blood Culture No Growth at 24 hrs  Lactic acid, plasma x2 [[id number]]  (Abnormal) Collected:  10/09/19 1157    Lab Status:  Final result Specimen:  Blood from Arm, Left Updated:  10/09/19 1235     LACTIC ACID 2 8 mmol/L     Narrative:       Result may be elevated if tourniquet was used during collection      Urine Microscopic [[id number]]  (Abnormal) Collected:  10/09/19 1053    Lab Status:  Final result Specimen:  Urine, Straight Cath Updated:  10/09/19 1131     RBC, UA 0-1 /hpf      WBC, UA 10-20 /hpf      Epithelial Cells Moderate /hpf      Bacteria, UA Moderate /hpf      Hyaline Casts, UA 1-2 /lpf     UA w Reflex to Microscopic [[id number]]  (Abnormal) Collected:  10/09/19 1053    Lab Status:  Final result Specimen:  Urine, Straight Cath Updated:  10/09/19 1105 Color, UA Yellow     Clarity, UA Cloudy     Specific Gravity, UA >=1 030     pH, UA 5 5     Leukocytes, UA Small     Nitrite, UA Positive     Protein, UA 30 (1+) mg/dl      Glucose, UA Negative mg/dl      Ketones, UA Trace mg/dl      Urobilinogen, UA 0 2 E U /dl      Bilirubin, UA Negative     Blood, UA Small    Comprehensive metabolic panel [[id number]]  (Abnormal) Collected:  10/09/19 0951    Lab Status:  Final result Specimen:  Blood from Arm, Left Updated:  10/09/19 1032     Sodium 141 mmol/L      Potassium 3 8 mmol/L      Chloride 108 mmol/L      CO2 20 mmol/L      ANION GAP 13 mmol/L      BUN 25 mg/dL      Creatinine 1 31 mg/dL      Glucose 189 mg/dL      Calcium 8 8 mg/dL      AST 33 U/L      ALT 36 U/L      Alkaline Phosphatase 49 U/L      Total Protein 7 1 g/dL      Albumin 3 6 g/dL      Total Bilirubin 0 40 mg/dL      eGFR 36 ml/min/1 73sq m     Narrative:       [facility] guidelines for Chronic Kid[facility] Disease (CKD):     Stage 1 with normal or high GFR (GFR > 90 mL/min/1 73 square meters)    Stage 2 Mild CKD (GFR = 60-89 mL/min/1 73 square meters)    Stage 3A Moderate CKD (GFR = 45-59 mL/min/1 73 square meters)    Stage 3B Moderate CKD (GFR = 30-44 mL/min/1 73 square meters)    Stage 4 Severe CKD (GFR = 15-29 mL/min/1 73 square meters)    Stage 5 End Stage CKD (GFR <15 mL/min/1 73 square meters)  Note: GFR calculation is accurate only with a steady state creatinine    B-type natriuretic peptide [[id number]]  (Abnormal) Collected:  10/09/19 0951    Lab Status:  Final result Specimen:  Blood from Arm, Left Updated:  10/09/19 1032     NT-proBNP 6,249 pg/mL     Lactic acid, plasma x2 [[id number]]  (Abnormal) Collected:  10/09/19 0951    Lab Status:  Final result Specimen:  Blood from Arm, Left Updated:  10/09/19 1030     LACTIC ACID 4 1 mmol/L     Narrative:       Result may be elevated if tourniquet was used during collection      Troponin I [[id number]]  (Abnormal) Collected: 10/09/19 0951    Lab Status:  Final result Specimen:  Blood from Arm, Left Updated:  10/09/19 1028     Troponin I 0 45 ng/mL     Protime-INR [[id number]]  (Normal) Collected:  10/09/19 0951    Lab Status:  Final result Specimen:  Blood from Arm, Left Updated:  10/09/19 1017     Protime 11 1 seconds      INR 1 04    APTT [[id number]]  (Abnormal) Collected:  10/09/19 0951    Lab Status:  Final result Specimen:  Blood from Arm, Left Updated:  10/09/19 1016     PTT 22 seconds     CBC and differential [[id number]]  (Abnormal) Collected:  10/09/19 0951    Lab Status:  Final result Specimen:  Blood from Arm, Left Updated:  10/09/19 1001     WBC 17 27 Thousand/uL      RBC 3 94 Million/uL      Hemoglobin 11 7 g/dL      Hematocrit 39 0 %      MCV 99 fL      MCH 29 7 pg      MCHC 30 0 g/dL      RDW 14 7 %      MPV 10 4 fL      Platelets 546 Thousands/uL      nRBC 0 /100 WBCs      Neutrophils Relative 77 %      Immat GRANS % 1 %      Lymphocytes Relative 17 %      Monocytes Relative 4 %      Eosinophils Relative 1 %      Basophils Relative 0 %      Neutrophils Absolute 13 26 Thousands/µL      Immature Grans Absolute 0 10 Thousand/uL      Lymphocytes Absolute 2 90 Thousands/µL      Monocytes Absolute 0 76 Thousand/µL      Eosinophils Absolute 0 20 Thousand/µL      Basophils Absolute 0 05 Thousands/µL                  CTA chest pe study   Final Result by Edy Kessler MD (10/10 7205)      Large acute bilateral pulmonary emboli with new small bilateral pleural effusions and CT evidence of RIGHT HEART STRAIN  Small ascending thoracic aortic aneurysm measuring 4 1 cm  Hiatal hernia  Dependent atelectasis in the lung bases                     I personally discussed this study with [facility] on 10/10/2019 at 3:48 PM                      Workstation performed: [id number]         CT chest abdomen pelvis wo contrast   Final Result by Edy Kessler MD (10/09 1200)      There are several indeterminate but potentially suspicious liver masses which might be metastatic disease  There also appear to be a couple liver cysts  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  There is a hiatal hernia  Right mastectomy  Indeterminate but probably cystic left renal lesion measures 2 9 cm  Mild colonic diverticulosis      The study was marked in EPIC for immediate notification  Workstation performed: [id number]         XR chest 1 view portable   Final Result by Ashley Liang MD (10/09 2377)      No acute cardiopulmonary disease  Workstation performed: [id number]         US right upper quadrant    (Results Pending)              Procedures  Procedures       ED Course  ED Course as of Oct 10 2011   Wed Oct 09, 2019   3791 Procedure Note: EKG  Date/Time: 10/09/19 9:42 AM   Performed by: Nirmal Rogers by: Noah Molina   Indications / Diagnosis: weakness  ECG reviewed by me, the ED Provider: yes   The EKG demonstrates:  Rhythm: normal sinus  Intervals: normal intervals  Axis: normal axis  QRS/Blocks: normal QRS  ST Changes: ST depression in lateral leads  T wave inversions in inferior leads  No TYRON        1007 WBC(!): 17 27   1037 Spoke with patient again regarding getting better history  Patient states she does not remember she has diarrhea  Daughter at bedside states she has been having diarrhea for a week now unsure if she has been having abdominal pain  According to the nurse patient did have some pain when she palpated the abdomen  Patient appears to be short of breath  Will do a noncontrast CT scan of chest Abdomen and pelvis       1037 No ST elevations and no chest pain    Troponin I(!): 0 45   1110 Leukocytes, UA(!): Small   1212 I discussed all the finding on the CT scan with the patient  Family at bedside daughter and son                         Initial Sepsis Screening     Row Name 10/09/19 1044                Is the patient's history suggestive of a new or worsening infection? (!) Yes (Proceed)  -SK        Suspected source of infection  pneumonia  -SK        Are two or more of the following signs & symptoms of infection both present and new to the patient? (!) Yes (Proceed)  -SK        Indicate SIRS criteria  Tachycardia > 90 bpm;Tachypnea > 20 resp per min;Leukocytosis (WBC > 66257 IJL)  -SK        If the answer is yes to both questions, suspicion of sepsis is present          If severe sepsis is present AND tissue hypoperfusion perists in the hour after fluid resuscitation or lactate > 4, the patient meets criteria for SEPTIC SHOCK          Are any of the following organ dysfunction criteria present within 6 hours of suspected infection and SIRS criteria that are NOT considered to be chronic conditions? (!) Yes  -SK        Organ dysfunction  Lactate >/equal 4 0 mmol/L  -SK        Date of presentation of severe sepsis  10/09/19  -SK        Time of presentation of severe sepsis  1045  -SK        Tissue hypoperfusion persists in the hour after crystalloid fluid administration, evidenced, by either:          Was hypotension present within one hour of the conclusion of crystalloid fluid administration?   No  -SK        Date of presentation of septic shock          Time of presentation of septic shock            User Key  (r) = Recorded By, (t) = Taken By, (c) = Cosigned By    Initials Name Provider Type    SK Rasta Bales MD Physician           Default Flowsheet Data (last 720 hours)      Sepsis Reassess     Row Name 10/09/19 1224                   Repeat Volume Status and Tissue Perfusion Assessment Performed    Repeat Volume Status and Tissue Perfusion Assessment Performed  Yes  -SK           Volume Status and Tissue Perfusion Post Fluid Resuscitation * Must Document All *    Vital Signs Reviewed (HR, RR, BP, T)  Yes  -SK        Shock Index Reviewed          Arterial Oxygen Saturation Reviewed (POx, SaO2 or SpO2)          Cardio  Normal S1/S2  -SK Pulmonary  Normal effort  -SK        Capillary Refill  Brisk  -SK        Peripheral Pulses          Skin  Warm  -SK        Urine output assessed  Adequate  -SK           *OR*   Intensive Monitoring- Must Document One of the Following Four *:    Vital Signs Reviewed  Yes  -SK        * Central Venous Pressure (CVP or RAP)          * Central Venous Oxygen (SVO2, ScvO2 or Oxygen saturation via central catheter)          * Bedside Cardiovascular US in IVC diameter and % collapse          * Passive Leg Raise OR Crystalloid Challenge            User Key  (r) = Recorded By, (t) = Taken By, (c) = Cosigned By    Initials Name Provider Type    SK Richy Maddox MD Physician                MDM    Disposition  Final diagnoses:   Elevated troponin   Severe sepsis (HCC)   UTI (urinary tract infection)   Liver mass     Time reflects when diagnosis was documented in both MDM as applicable and the Disposition within this note     Time User Action Codes Description Comment    10/9/2019 12:22 PM Michoacano Hernadez U  8  [R79 89] Elevated troponin     10/9/2019 12:22 PM Yaz Hernadez Add [A41 9,  R65 20] Severe sepsis ([address] )     10/9/2019 12:22 PM Jimmy Hernadez 61 Add [N39 0] UTI (urinary tract infection)     10/9/2019 12:22 PM Yaz Hernadez Add [R16 0] Liver mass     10/9/2019  3:33 PM Sunshine Ontiveros Add [G30 9,  F02 81] Alzheimer's dementia with behavioral disturbance, unspecified timing of dementia onset [facility])       ED Disposition     ED Disposition Condition Date/Time Comment    Admit Stable Wed Oct 9, 2019 12:22 PM Case was discussed with medicine and the patient's admission status was agreed to be Admission Status: inpatient status to the service of Dr Ramos Comp   Follow-up Information    None         Current Discharge Medication List      CONTINUE these medications which have NOT CHANGED    Details   aspirin 81 MG tablet Take 81 mg by mouth every morning        COMBIGAN 0 2-0 5 %       donepezil (ARICEPT) 5 mg tablet Take 1 tablet (5 mg total) by mouth daily at bedtime  Qty: 90 tablet, Refills: 1    Associated Diagnoses: Alzheimer's dementia without behavioral disturbance, unspecified timing of dementia onset (HCC)      memantine (NAMENDA) 10 mg tablet       Multiple Vitamin (MULTIVITAMIN) tablet Take 1 tablet by mouth daily      tamoxifen (NOLVADEX) 20 mg tablet Take 1 tablet (20 mg total) by mouth daily  Qty: 30 tablet, Refills: 2    Associated Diagnoses: Malignant neoplasm of breast in female, estrogen receptor positive, unspecified laterality, unspecified site of breast (HCC)      fluocinolone (SYNALAR) 0 025 % cream       lisinopril (ZESTRIL) 20 mg tablet Take 20 mg by mouth every morning  LORazepam (ATIVAN) 0 5 mg tablet Take 1 tablet (0 5 mg total) by mouth every 8 (eight) hours as needed for anxiety  Qty: 60 tablet, Refills: 1    Associated Diagnoses: Anxiety; Alzheimer's dementia without behavioral disturbance, unspecified timing of dementia onset (HCC)      Memantine HCl ER (NAMENDA XR) 7 MG CP24 Take 1 tablet by mouth every morning  No discharge procedures on file      ED Provider  Electronically Signed by           Geovanni Gimenez MD  10/10/19 2011

## 2019-10-09 NOTE — H&P
History and Physical - [facility]    Patient Information: Misty Ji 80 y o  female MRN: [id number]  Unit/Bed#: [address] [address] Encounter: [id number]  Admitting Physician: Lynnette Mckeon DO  PCP: Leslie Kiser MD  Date of Admission:  10/09/19        Hospital Problem List:     Principal Problem:    Severe sepsis [facility])  Active Problems:    Acute cystitis    Type 2 myocardial infarction [facility])    Essential hypertension    Malignant neoplasm of overlapping sites of right breast in female, estrogen receptor positive ([facility] [address] )    Liver mass    Weakness generalized    Moderate dementia with behavioral disturbance ([facility] [address] )    Diarrhea      Assessment/Plan:    * Severe sepsis ([facility] [address] )  Assessment & Plan  As noted by leukocytosis, tachycardia, lactic acidosis with lactic acid of 4 1 with source of infection being acute cystitis  Patient received 30 mL/kg fluid bolus in the ER  Trend lactate until normalizes  Continue IV fluids  Follow up blood cultures, urine culture  CT chest, abdomen/pelvis was negative for any infectious etiology      Acute cystitis  Assessment & Plan  Patient received a dose of IV cefepime in the ER  Prior and culture has grown E coli which was pansensitive  Place patient on IV Rocephin  Follow-up on pending urine culture        Type 2 myocardial infarction [facility])  Assessment & Plan  Patient's initial troponin noted to be 0 45 with new EKG changes in the setting of severe sepsis, cystitis  Case was discussed with Cardiology and will manage medically for now  Trend troponins  Monitor on telemetry  Patient received a full dose of aspirin    Continue baby aspirin, Lopressor  Check echo      Essential hypertension  Assessment & Plan  Continue lisinopril with holding parameters and Lopressor added  Monitor blood pressures    Diarrhea  Assessment & Plan  As per daughter, patient with diarrhea since yesterday  If patient has further diarrhea here, check for C diff and stool bacterial enteric panel    Moderate dementia with behavioral disturbance ([facility] [address] )  Assessment & Plan  Continue Namenda, Aricept  Psychiatry consult    Weakness generalized  Assessment & Plan  Most likely due to severe sepsis, cystitis  PT/OT while here    Liver mass  Assessment & Plan  On CT scan, patient noted to have several liver masses which could be metastatic disease  Few liver cysts were also noted  Oncology consult    Malignant neoplasm of overlapping sites of right breast in female, estrogen receptor positive [facility])  Assessment & Plan  Patient is status post right mastectomy  Continue tamoxifen  Oncology consult as noted above          VTE Prophylaxis: Heparin  / sequential compression device   Code Status: Level 3 - DNAR and DNI    Anticipated Length of Stay:  Patient will be admitted on an Inpatient basis with an anticipated length of stay of atleast 2 midnights  Justification for Hospital Stay: sepsis due to UTI, Type 2 myocardial infarction    Total Time for Visit, including Counseling / Coordination of Care: 45 minutes  Greater than 50% of this total time spent on direct patient counseling and coordination of care  Chief Complaint:     Weakness - Generalized (C/O Gen weakness X1 week   Daughter reports patient could not get out of shower this morning so EMS was called  ) and Diarrhea (C/O diarrhea on off X 1 week  No recent antibiotic treatment )    of Present Illness:    Nikita Floyd is a 80 y o  female who presents with generalized weakness  Patient with dementia and therefore not providing any meaningful history  Patient upset about being in the hospital and kept asking how she came in and who brought her to the hospital   History was obtained by reviewing ER records and by speaking with her daughter over the phone  Patient has been having generalized weakness over the last week which worsened today  Patient could not even get out of the shower this morning so EMS was called    As per daughter, patient also started having diarrhea last night and today  No recent antibiotic use, travels or eating anything out of the ordinary    Review of Systems:    Review of Systems   Unable to perform ROS: Dementia (Patient saying no to all ROS questions)       Past Medical and Surgical History:     Past Medical History:   Diagnosis Date    Arthritis     Dementia ([address] )     Hypertension     Incontinent of urine     Varicose veins of legs        Past Surgical History:   Procedure Laterality Date    BREAST EXCISIONAL BIOPSY Right     CATARACT EXTRACTION W/ INTRAOCULAR LENS IMPLANT Right 01/05/2016    EYE SURGERY      lazy eye    MASTECTOMY      MODIFIED RADICAL MASTECTOMY W/ AXILLARY LYMPH NODE DISSECTION Right 6/15/2016    Procedure: MASTECTOMY MODIFIED RADICAL (WITHOUT SENTINEL NODE BIOPSY); Surgeon: Lesley Gamez MD;  Location: [address] [place] [address];  Service:     OOPHORECTOMY Right     1965     [facility] [address] CATARACT EXTRACAP,INSERT LENS Left 2/23/2016    Procedure: EXTRACTION EXTRACAPSULAR CATARACT PHACO INTRAOCULAR LENS (IOL); Surgeon: Cornel Bermudez MD;  [facility] MAIN OR;  Service: Ophthalmology    TONSILLECTOMY  1935    US GUIDED BREAST BIOPSY RIGHT COMPLETE Right 5/3/2016       Meds/Allergies:    PTA meds:   Prior to Admission Medications   Prescriptions Last Dose Informant Patient Reported? Taking? COMBIGAN 0 2-0 5 % 10/8/2019 at Unknown time Child Yes Yes   LORazepam (ATIVAN) 0 5 mg tablet Not Taking at Unknown time Child No No   Sig: Take 1 tablet (0 5 mg total) by mouth every 8 (eight) hours as needed for anxiety   Patient not taking: Reported on 10/9/2019   Memantine HCl ER (NAMENDA XR) 7 MG CP24 Not Taking at Unknown time Child Yes No   Sig: Take 1 tablet by mouth every morning  Multiple Vitamin (MULTIVITAMIN) tablet 10/8/2019 at Unknown time  Yes Yes   Sig: Take 1 tablet by mouth daily   aspirin 81 MG tablet 10/8/2019 at Unknown time Child Yes Yes   Sig: Take 81 mg by mouth every morning     donepezil (ARICEPT) 5 mg tablet 10/8/2019 at Unknown time Child No Yes   Sig: Take 1 tablet (5 mg total) by mouth daily at bedtime   fluocinolone (SYNALAR) 0 025 % cream Not Taking at Unknown time Child Yes No   lisinopril (ZESTRIL) 20 mg tablet Not Taking at Unknown time Child Yes No   Sig: Take 20 mg by mouth every morning  memantine (NAMENDA) 10 mg tablet 10/8/2019 at Unknown time Child Yes Yes   tamoxifen (NOLVADEX) 20 mg tablet 10/8/2019 at Unknown time Child No Yes   Sig: Take 1 tablet (20 mg total) by mouth daily      Facility-Administered Medications: None       Allergies: No Known Allergies  History:     Marital Status:      Substance Use History:   Social History     Substance and Sexual Activity   Alcohol Use Not Currently    Comment: socially     Social History     Tobacco Use   Smoking Status Never Smoker   Smokeless Tobacco Never Used     Social History     Substance and Sexual Activity   Drug Use No       Family History:    History reviewed  No pertinent family history  Physical Exam:     Vitals:   Blood Pressure: 122/69 (10/09/19 1534)  Pulse: 97 (10/09/19 1534)  Temperature: 97 6 °F (36 4 °C) (10/09/19 1534)  Temp Source: Tympanic (10/09/19 1534)  Respirations: (!) 23 (10/09/19 1534)  Height: 5' 2" (157 5 cm) (10/09/19 1307)  Weight - Scale: 58 9 kg (129 lb 14 4 oz) (10/09/19 1307)  SpO2: 97 % (10/09/19 1534)    Physical Exam   Constitutional: No distress  HENT:   Head: Normocephalic and atraumatic  Eyes: EOM are normal  Right eye exhibits no discharge  Left eye exhibits no discharge  No scleral icterus  Neck: Neck supple  Cardiovascular: Normal rate and regular rhythm  Murmur heard  Pulmonary/Chest: Effort normal and breath sounds normal  No respiratory distress  She has no wheezes  She has no rales  Abdominal: Soft  Bowel sounds are normal  She exhibits no distension  There is no tenderness  Musculoskeletal: She exhibits no edema  Neurological: She is alert   No cranial nerve deficit  Oriented to self and place   Skin: She is not diaphoretic  Psychiatric: Her affect is angry  Thought content is paranoid  Cognition and memory are impaired  She expresses impulsivity  Lab Results: I have personally reviewed pertinent reports  Results from last 7 days   Lab Units 10/09/19  0951   WBC Thousand/uL 17 27*   HEMOGLOBIN g/dL 11 7   HEMATOCRIT % 39 0   PLATELETS Thousands/uL 182   NEUTROS PCT % 77*   LYMPHS PCT % 17   MONOS PCT % 4   EOS PCT % 1     Results from last 7 days   Lab Units 10/09/19  0951   POTASSIUM mmol/L 3 8   CHLORIDE mmol/L 108   CO2 mmol/L 20*   BUN mg/dL 25   CREATININE mg/dL 1 31*   CALCIUM mg/dL 8 8   ALK PHOS U/L 49   ALT U/L 36   AST U/L 33     Results from last 7 days   Lab Units 10/09/19  0951   INR  1 04       Imaging: I have personally reviewed pertinent reports  Ct Chest Abdomen Pelvis Wo Contrast    Result Date: 10/9/2019  Narrative: CT CHEST, ABDOMEN AND PELVIS WITHOUT IV CONTRAST INDICATION:   poor historian, short of breath, intermittent abdominal pain, diarrhea  COMPARISON:  None  TECHNIQUE: CT examination of the chest, abdomen and pelvis was performed without intravenous contrast   Axial, sagittal, and coronal 2D reformatted images were created from the source data and submitted for interpretation  Radiation dose length product (DLP) for this visit:  441 69 mGy-cm   This examination, like all CT scans performed in the [facility], was performed utilizing techniques to minimize radiation dose exposure, including the use of iterative  reconstruction and automated exposure control  FINDINGS: CHEST LUNGS:  Lungs are clear  There is no tracheal or endobronchial lesion  PLEURA:  Tiny focus of pleural scarring laterally on the right HEART/GREAT VESSELS:  Heart size is normal   There are some coronary artery calcification  There is no pericardial effusion    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 which is 4 2 cm diameter  MEDIASTINUM AND RENETTA:  There is a small hiatal hernia  CHEST WALL AND LOWER NECK:   Patient is status post right mastectomy  The left breast is unremarkable  There is no axillary lymphadenopathy  The base of the neck is unremarkable  ABDOMEN LIVER/BILIARY TREE:  There is a round low-density cystic-appearing lesion in the left hepatic lobe on image 46 measuring 13 mm  There are other small somewhat ill-defined low-density lesion seen elsewhere in the liver which are indeterminate  These might be metastatic lesions given the somewhat hazy appearance  Assessment is limited without contrast   There is a lesion posteriorly in the left hepatic lobe on image 54 which measures 9 x 14 mm  There is a round lesion in the right hepatic lobe on image 52 which measures 16 mm  There is a lesion medially in the lower portion of the right hepatic lobe on image 61 which is 8 x 10 mm  A more circumscribed benign-appearing low-density lesion near the fissure for the falciform ligament on image 63 is 12 mm and might be a cyst   There is no evidence of biliary dilatation  GALLBLADDER:  No calcified gallstones  No pericholecystic inflammatory change  SPLEEN:  The spleen is not seen  Correlate with surgical history  PANCREAS:  Unremarkable  ADRENAL GLANDS:  Unremarkable  KIDNEYS/URETERS:  Exophytic round smooth bordered low-density mass arising posteriorly from the left kidney is 2 9 cm diameter and measures 27 Hounsfield units internally  This is probably a cyst   There is no hydronephrosis or kidney stone on either side  No perinephric fluid  STOMACH AND BOWEL:  There is mild colonic diverticulosis without diverticulitis  There is no bowel obstruction  APPENDIX:  No findings to suggest appendicitis  ABDOMINOPELVIC CAVITY:  No ascites or free intraperitoneal air  No lymphadenopathy  VESSELS:  Atherosclerotic changes are present  No evidence of aneurysm   PELVIS REPRODUCTIVE ORGANS:  There is a pessary in the vagina  The uterus may be retroverted  No suspicious adnexal masses are seen  URINARY BLADDER:  Unremarkable  ABDOMINAL WALL/INGUINAL REGIONS:  Unremarkable  OSSEOUS STRUCTURES:  No acute fractures are seen  There is grade 1 anterolisthesis L5 on S1 due to chronic bilateral L5 pars interarticularis fractures  There is a slight chronic compression deformity superiorly at L3  There is multilevel disc degeneration the lumbar spine  There are no suspicious appearing lytic or blastic bone lesions or acute fractures seen  Impression: There are several indeterminate but potentially suspicious liver masses which might be metastatic disease  There also appear to be a couple liver cysts  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  There is a hiatal hernia  Right mastectomy  Indeterminate but probably cystic left renal lesion measures 2 9 cm  Mild colonic diverticulosis The study was marked in EPIC for immediate notification  Workstation performed: [id number]     Xr Chest 1 View Portable    Result Date: 10/9/2019  Narrative: CHEST INDICATION:   weakness  COMPARISON:  1/11/2018 EXAM PERFORMED/VIEWS:  XR CHEST PORTABLE FINDINGS:  Right axillary clips are noted  The heart is normal in size  There is a hiatal hernia  The lungs are clear  No pneumothorax or pleural effusion  Osseous structures appear within normal limits for patient age  Impression: No acute cardiopulmonary disease  Workstation performed: [id number]       CT chest abdomen pelvis wo contrast   Final Result      There are several indeterminate but potentially suspicious liver masses which might be metastatic disease  There also appear to be a couple liver cysts  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  There is a hiatal hernia  Right mastectomy  Indeterminate but probably cystic left renal lesion measures 2 9 cm        Mild colonic diverticulosis      The study was marked in EPIC for immediate notification  Workstation performed: [id number]         XR chest 1 view portable   Final Result      No acute cardiopulmonary disease  Workstation performed: [id number]             EKG, Pathology, and Other Studies Reviewed on Admission:   · EKG shows sinus rhythm with ST depression and T-wave inversions in anterior lateral leads which is new    Allscripts/EPIC Records Reviewed: Yes     ** Please Note: "This note has been constructed using a voice recognition system  Therefore there may be syntax, spelling, and/or grammatical errors   Please call if you have any questions  "**

## 2019-10-09 NOTE — CONSULTS
Consultation - Marco Logan 80 y o  female MRN: [id number]    Unit/Bed#: [address] [place] [address] Encounter: [id number]      Identifying Data:  80years old white female is admitted at [facility] on 2019 earlier today with complaining of weakness diarrhea and shortness of breath  Psychiatric consultation is asked for dementia  Patient examined spoke with the nurse history physical medications labs reviewed and noted spoke with the daughter and son in low at bedside to obtain the background information  Nurse reported that patient is angry and very stubborn uncooperative and upset  I try to talk to her and she will not answer stating that everything is in the paper  I obtained the information from daughter  Patient has been demented since  and she is getting the Aricept and Namenda and Ativan p r n  From family physician Dr Forrest Zurita   daughter reports behavior problem at home she is very stubborn she will not listen to her  Patient is angry and she reports that she wants to go home after talking to the daughter I sat down and talked with the patient again asking her that if she answer my question and she get better she will be going home so she started responding to my questions patient could not tell me her age but she was able to tell me her date of birth could not tell me current year current month name of the president she is able to tell me that she is in the hospital but could not tell me the name of the hospital   And she is angry at the daughter stating that''they brought me here , I want to go home''patient is angry daughter also described her very stubborn and ongoing behavior problem at home  Patient is living with the daughter since last 2-3 years after her      Patient is not smoking not abusing alcohol or drugs she has no history of drug and alcohol abuse patient has 12th grade education and 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  Patient has 4 children  Patient is suffering from dementia depression anxiety arthritis hypertension incontinent of urine varicose vein of legs mastectomy I surgery cataract surgery breast axis node biopsy tonsillectomy no known allergy    Chief Complaints:  Dementia with behavior problem    Family History: History reviewed  No pertinent family history  Daughter denies    Legal History:  Daughter denies    Mental Status Exam:  80years old white female is lying in the bed uncooperative affect flat withdrawn guarded paranoid demented confabulates with the questions she was able to tell me that she is in the hospital but could not tell me her age current year current month name of the president and she could not tell me why she is here she just wants to go home  Memory impaired poor sleep poor appetite she feels weak low appetite she probably lost some weight trouble in breathing and very weak she looks pale  Patient is paranoid uncooperative but no delusion elucidated  No evidence of hallucinations  Depressed but not suicidal   Anxious gets agitated combative  Poor concentration  Insight and judgments are limited due to dementia and possibly delirium at this time due to medical condition  History of Present Illness     HPI: Stacy Lucero is a 80y o  year old female who presents with weakness diarrhea and trouble in breathing    Consults      Historical Information   Past Psychiatric History:  Patient is suffering from dementia with depression and behavior problems she is taking Namenda Aricept and Ativan at home  I reviewed the med list at home  Patient has no psych history before dementia started in 2015 she has been getting medications from the family physician    Patient has not seen a psychiatrist no psychiatric admissions no suicide attempts in the past no history of drug and alcohol abuse no legal problems in the past   Past Medical History:   Diagnosis Date    Arthritis     Dementia ([facility] [address] )  Hypertension     Incontinent of urine     Varicose veins of legs      Past Surgical History:   Procedure Laterality Date    BREAST EXCISIONAL BIOPSY Right     CATARACT EXTRACTION W/ INTRAOCULAR LENS IMPLANT Right 01/05/2016    EYE SURGERY      lazy eye    MASTECTOMY      MODIFIED RADICAL MASTECTOMY W/ AXILLARY LYMPH NODE DISSECTION Right 6/15/2016    Procedure: MASTECTOMY MODIFIED RADICAL (WITHOUT SENTINEL NODE BIOPSY); Surgeon: Radha Roe MD;  Location: [address] [place] [address];  Service:     OOPHORECTOMY Right     1965     [address] CATARACT EXTRACAP,INSERT LENS Left 2/23/2016    Procedure: EXTRACTION EXTRACAPSULAR CATARACT PHACO INTRAOCULAR LENS (IOL);   Surgeon: Yakov Orozco MD;  Location: [facility] MAIN OR;  Service: Ophthalmology   [facility] BREAST BIOPSY RIGHT COMPLETE Right 5/3/2016     Social History   Social History     Substance and Sexual Activity   Alcohol Use Not Currently    Comment: socially     Social History     Substance and Sexual Activity   Drug Use No     Social History     Tobacco Use   Smoking Status Never Smoker   Smokeless Tobacco Never Used       Meds/Allergies   current meds:   Current Facility-Administered Medications   Medication Dose Route Frequency    [START ON 10/10/2019] aspirin (ECOTRIN LOW STRENGTH) EC tablet 81 mg  81 mg Oral Daily    aspirin tablet 325 mg  325 mg Oral Once    cefTRIAXone (ROCEPHIN) IVPB (premix) 1,000 mg  1,000 mg Intravenous Q24H    donepezil (ARICEPT) tablet 5 mg  5 mg Oral HS    enoxaparin (LOVENOX) subcutaneous injection 60 mg  1 mg/kg Subcutaneous Daily    [START ON 10/10/2019] lisinopril (ZESTRIL) tablet 20 mg  20 mg Oral QAM    metoprolol tartrate (LOPRESSOR) tablet 25 mg  25 mg Oral Q12H [address]    [START ON 10/10/2019] pantoprazole (PROTONIX) EC tablet 40 mg  40 mg Oral Early Morning    pneumococcal 13-valent conjugate vaccine (PREVNAR-13) IM injection 0 5 mL  0 5 mL Intramuscular Prior to discharge    sodium chloride 0 9 % infusion  75 mL/hr Intravenous Continuous    and PTA meds:    Medications Prior to Admission   Medication    aspirin 81 MG tablet    COMBIGAN 0 2-0 5 %    donepezil (ARICEPT) 5 mg tablet    memantine (NAMENDA) 10 mg tablet    Multiple Vitamin (MULTIVITAMIN) tablet    tamoxifen (NOLVADEX) 20 mg tablet    fluocinolone (SYNALAR) 0 025 % cream    lisinopril (ZESTRIL) 20 mg tablet    LORazepam (ATIVAN) 0 5 mg tablet    Memantine HCl ER (NAMENDA XR) 7 MG CP24     No Known Allergies    Objective   Vitals: Blood pressure 122/69, pulse 97, temperature 97 6 °F (36 4 °C), temperature source Tympanic, resp  rate (!) 23, height 5' 2" (1 575 m), weight 58 9 kg (129 lb 14 4 oz), SpO2 97 %, not currently breastfeeding        Routine Lab Results:   Admission on 10/09/2019   Component Date Value Ref Range Status    WBC 10/09/2019 17 27* 4 31 - 10 16 Thousand/uL Final    RBC 10/09/2019 3 94  3 81 - 5 12 Million/uL Final    Hemoglobin 10/09/2019 11 7  11 5 - 15 4 g/dL Final    Hematocrit 10/09/2019 39 0  34 8 - 46 1 % Final    MCV 10/09/2019 99* 82 - 98 fL Final    MCH 10/09/2019 29 7  26 8 - 34 3 pg Final    MCHC 10/09/2019 30 0* 31 4 - 37 4 g/dL Final    RDW 10/09/2019 14 7  11 6 - 15 1 % Final    MPV 10/09/2019 10 4  8 9 - 12 7 fL Final    Platelets 58/89/4594 182  149 - 390 Thousands/uL Final    nRBC 10/09/2019 0  /100 WBCs Final    Neutrophils Relative 10/09/2019 77* 43 - 75 % Final    Immat GRANS % 10/09/2019 1  0 - 2 % Final    Lymphocytes Relative 10/09/2019 17  14 - 44 % Final    Monocytes Relative 10/09/2019 4  4 - 12 % Final    Eosinophils Relative 10/09/2019 1  0 - 6 % Final    Basophils Relative 10/09/2019 0  0 - 1 % Final    Neutrophils Absolute 10/09/2019 13 26* 1 85 - 7 62 Thousands/µL Final    Immature Grans Absolute 10/09/2019 0 10  0 00 - 0 20 Thousand/uL Final    Lymphocytes Absolute 10/09/2019 2 90  0 60 - 4 47 Thousands/µL Final    Monocytes Absolute 10/09/2019 0 76  0 17 - 1 22 Thousand/µL Final    Eosinophils Absolute 10/09/2019 0 20  0 00 - 0 61 Thousand/µL Final    Basophils Absolute 10/09/2019 0 05  0 00 - 0 10 Thousands/µL Final    Protime 10/09/2019 11 1  9 8 - 12 0 seconds Final    INR 10/09/2019 1 04  0 91 - 1 09 Final    PTT 10/09/2019 22* 25 - 32 seconds Final    Therapeutic Heparin Range =  60-90 seconds    ABO Grouping 10/09/2019 O   Final    Rh Factor 10/09/2019 Positive   Final    Antibody Screen 10/09/2019 Negative   Final    Specimen Expiration Date 10/09/2019 [id number]   Final    Sodium 10/09/2019 141  136 - 145 mmol/L Final    Potassium 10/09/2019 3 8  3 5 - 5 3 mmol/L Final    Chloride 10/09/2019 108  100 - 108 mmol/L Final    CO2 10/09/2019 20* 21 - 32 mmol/L Final    ANION GAP 10/09/2019 13  4 - 13 mmol/L Final    BUN 10/09/2019 25  5 - 25 mg/dL Final    Creatinine 10/09/2019 1 31* 0 60 - 1 30 mg/dL Final    Standardized to IDMS reference method    Glucose 10/09/2019 189* 65 - 140 mg/dL Final      If the patient is fasting, the ADA then defines impaired fasting glucose as > 100 mg/dL and diabetes as > or equal to 123 mg/dL  Specimen collection should occur prior to Sulfasalazine administration due to the potential for falsely depressed results  Specimen collection should occur prior to Sulfapyridine administration due to the potential for falsely elevated results   Calcium 10/09/2019 8 8  8 3 - 10 1 mg/dL Final    AST 10/09/2019 33  5 - 45 U/L Final      Specimen collection should occur prior to Sulfasalazine administration due to the potential for falsely depressed results   ALT 10/09/2019 36  12 - 78 U/L Final      Specimen collection should occur prior to Sulfasalazine administration due to the potential for falsely depressed results       Alkaline Phosphatase 10/09/2019 49  46 - 116 U/L Final    Total Protein 10/09/2019 7 1  6 4 - 8 2 g/dL Final    Albumin 10/09/2019 3 6  3 5 - 5 0 g/dL Final    Total Bilirubin 10/09/2019 0 40  0 20 - 1 00 mg/dL Final    eGFR 10/09/2019 36  ml/min/1 73sq m Final    LACTIC ACID 10/09/2019 4 1* 0 5 - 2 0 mmol/L Final    LACTIC ACID 10/09/2019 2 8* 0 5 - 2 0 mmol/L Final    Troponin I 10/09/2019 0 45* <=0 04 ng/mL Final      [employer] Chemistry analyzer 99% cutoff is > 0 04 ng/mL in network labs     o cTnI 99% cutoff is useful only when applied to patients in the clinical setting of myocardial ischemia   o cTnI 99% cutoff should be interpreted in the context of clinical history, ECG findings and possibly cardiac imaging to establish correct diagnosis  o cTnI 99% cutoff may be suggestive but clearly not indicative of a coronary event without the clinical setting of myocardial ischemia      Results indicate test should be repeated on new specimen collected within 4-6 hours of the original    NT-proBNP 10/09/2019 6,249* <450 pg/mL Final    Color, UA 10/09/2019 Yellow   Final    Clarity, UA 10/09/2019 Cloudy   Final    Specific Gravity, UA 10/09/2019 >=1 030  1 000 - 1 030 Final    pH, UA 10/09/2019 5 5  5 0, 5 5, 6 0, 6 5, 7 0, 7 5, 8 0, 8 5, 9 0 Final    Leukocytes, UA 10/09/2019 Small* Negative Final    Nitrite, UA 10/09/2019 Positive* Negative Final    Protein, UA 10/09/2019 30 (1+)* Negative mg/dl Final    Glucose, UA 10/09/2019 Negative  Negative mg/dl Final    Ketones, UA 10/09/2019 Trace* Negative mg/dl Final    Urobilinogen, UA 10/09/2019 0 2  0 2, 1 0 E U /dl E U /dl Final    Bilirubin, UA 10/09/2019 Negative  Negative Final    Blood, UA 10/09/2019 Small* Negative Final    Ventricular Rate 10/09/2019 105  BPM Final    Atrial Rate 10/09/2019 105  BPM Final    VA Interval 10/09/2019 206  ms Final    QRSD Interval 10/09/2019 80  ms Final    QT Interval 10/09/2019 348  ms Final    QTC Interval 10/09/2019 459  ms Final    P Axis 10/09/2019 46  degrees Final    QRS Axis 10/09/2019 -22  degrees Final    T Wave Axis 10/09/2019 -29  degrees Final    RBC, UA 10/09/2019 0-1* None Seen, 0-5 /hpf Final    WBC, UA 10/09/2019 10-20* None Seen, 0-5, 5-55, 5-65 /hpf Final    Epithelial Cells 10/09/2019 Moderate* None Seen, Occasional /hpf Final    Bacteria, UA 10/09/2019 Moderate* None Seen, Occasional /hpf Final    Hyaline Casts, UA 10/09/2019 1-2* (none) /lpf Final    LACTIC ACID 10/09/2019 2 2* 0 5 - 2 0 mmol/L Final    Troponin I 10/09/2019 0 47* <=0 04 ng/mL Final      [employer] Chemistry analyzer 99% cutoff is > 0 04 ng/mL in network labs     o cTnI 99% cutoff is useful only when applied to patients in the clinical setting of myocardial ischemia   o cTnI 99% cutoff should be interpreted in the context of clinical history, ECG findings and possibly cardiac imaging to establish correct diagnosis  o cTnI 99% cutoff may be suggestive but clearly not indicative of a coronary event without the clinical setting of myocardial ischemia  Results indicate test should be repeated on new specimen collected within 4-6 hours of the original    LACTIC ACID 10/09/2019 3 0* 0 5 - 2 0 mmol/L Final         Diagnosis:  Moderate dementia with depression and behavior problem  Anxiety  Delirium    Plan:  Continue Aricept 5 mg HS  Namenda 5 mg daily  Ativan 0 5 mg p o  Q 8 hours p r n  For anxiety  I may add antidepressant later  Psychotherapy  Psychiatric follow-up recommended after the discharge  Discussed with the nurse and daughter at bedside  I will follow up      Holden Cid MD

## 2019-10-09 NOTE — ASSESSMENT & PLAN NOTE
Restarted Namenda, Aricept and Lexapro which was started here as patient is no longer comfort measures only  She was seen by Psychiatry

## 2019-10-09 NOTE — ASSESSMENT & PLAN NOTE
Patient's initial troponin noted to be 0 45 with new EKG changes in the setting of severe sepsis, cystitis, acute PE  Troponin peaked at 0 54  Patient received a full dose of aspirin now restarted back on baby aspirin (discussed with Cardiology)   Lopressor was previously discontinued as patient was noted to be hypotensive  echo showed normal EF with grade 1 diastolic dysfunction    Moderate pulmonary hypertension was noted along with increased RV pressures  Continue baby aspirin and restarted on Lopressor

## 2019-10-09 NOTE — ASSESSMENT & PLAN NOTE
Patient received a dose of IV cefepime in the ER  Patient was started on on IV Rocephin and later switched to cefepime and vancomycin   Vancomycin was discontinued and was then switched to Rocephin  Now off antibiotics  urine culture grew> 100,000 E coli  Blood cultures have been negative    Procalcitonin level noted to be mildly elevated

## 2019-10-09 NOTE — ASSESSMENT & PLAN NOTE
On CT scan, patient noted to have several liver masses which could be metastatic disease  Few liver cysts were also noted  Oncology was consulted  Patient with elevated liver enzymes on prior lab work  Right upper quadrant ultrasound showed hepatic cyst, cholelithiasis    Outpatient follow-up with Dr Rm Grissom

## 2019-10-09 NOTE — ASSESSMENT & PLAN NOTE
Lopressor and lisinopril was discontinued as patient was noted to be hypotensive  Patient's blood pressure is currently running high  Restarted on Lopressor and lisinopril

## 2019-10-10 ENCOUNTER — APPOINTMENT (INPATIENT)
Dept: NON INVASIVE DIAGNOSTICS | Facility: HOSPITAL | Age: 84
DRG: 871 | End: 2019-10-10
Payer: MEDICARE

## 2019-10-10 ENCOUNTER — APPOINTMENT (INPATIENT)
Dept: RADIOLOGY | Facility: HOSPITAL | Age: 84
DRG: 871 | End: 2019-10-10
Payer: MEDICARE

## 2019-10-10 PROBLEM — R19.7 DIARRHEA: Status: RESOLVED | Noted: 2019-10-09 | Resolved: 2019-10-10

## 2019-10-10 PROBLEM — I26.99 PULMONARY EMBOLISM (HCC): Status: ACTIVE | Noted: 2019-10-10

## 2019-10-10 PROBLEM — R74.01 TRANSAMINITIS: Status: ACTIVE | Noted: 2019-10-10

## 2019-10-10 LAB
ALBUMIN SERPL BCP-MCNC: 2.7 G/DL (ref 3.5–5)
ALP SERPL-CCNC: 41 U/L (ref 46–116)
ALT SERPL W P-5'-P-CCNC: 114 U/L (ref 12–78)
ANION GAP SERPL CALCULATED.3IONS-SCNC: 11 MMOL/L (ref 4–13)
ARTERIAL PATENCY WRIST A: YES
AST SERPL W P-5'-P-CCNC: 86 U/L (ref 5–45)
ATRIAL RATE: 101 BPM
ATRIAL RATE: 88 BPM
BASE EX.OXY STD BLDV CALC-SCNC: 66.6 % (ref 60–80)
BASE EXCESS BLDV CALC-SCNC: -10.8 MMOL/L
BASOPHILS # BLD AUTO: 0.03 THOUSANDS/ΜL (ref 0–0.1)
BASOPHILS NFR BLD AUTO: 0 % (ref 0–1)
BILIRUB SERPL-MCNC: 0.6 MG/DL (ref 0.2–1)
BODY TEMPERATURE: ABNORMAL DEGREES FEHRENHEIT
BUN SERPL-MCNC: 22 MG/DL (ref 5–25)
CALCIUM SERPL-MCNC: 7 MG/DL (ref 8.3–10.1)
CHLORIDE SERPL-SCNC: 115 MMOL/L (ref 100–108)
CO2 SERPL-SCNC: 17 MMOL/L (ref 21–32)
CREAT SERPL-MCNC: 1.07 MG/DL (ref 0.6–1.3)
EOSINOPHIL # BLD AUTO: 0 THOUSAND/ΜL (ref 0–0.61)
EOSINOPHIL NFR BLD AUTO: 0 % (ref 0–6)
ERYTHROCYTE [DISTWIDTH] IN BLOOD BY AUTOMATED COUNT: 15.3 % (ref 11.6–15.1)
GFR SERPL CREATININE-BSD FRML MDRD: 46 ML/MIN/1.73SQ M
GLUCOSE SERPL-MCNC: 171 MG/DL (ref 65–140)
HCO3 BLDV-SCNC: 15 MMOL/L (ref 24–30)
HCT VFR BLD AUTO: 34.3 % (ref 34.8–46.1)
HGB BLD-MCNC: 10.4 G/DL (ref 11.5–15.4)
IMM GRANULOCYTES # BLD AUTO: 0.15 THOUSAND/UL (ref 0–0.2)
IMM GRANULOCYTES NFR BLD AUTO: 1 % (ref 0–2)
LACTATE SERPL-SCNC: 1.8 MMOL/L (ref 0.5–2)
LACTATE SERPL-SCNC: 2.1 MMOL/L (ref 0.5–2)
LACTATE SERPL-SCNC: 2.4 MMOL/L (ref 0.5–2)
LACTATE SERPL-SCNC: 2.7 MMOL/L (ref 0.5–2)
LACTATE SERPL-SCNC: 2.8 MMOL/L (ref 0.5–2)
LACTATE SERPL-SCNC: 4.7 MMOL/L (ref 0.5–2)
LYMPHOCYTES # BLD AUTO: 1.1 THOUSANDS/ΜL (ref 0.6–4.47)
LYMPHOCYTES NFR BLD AUTO: 5 % (ref 14–44)
MAGNESIUM SERPL-MCNC: 1.7 MG/DL (ref 1.6–2.6)
MCH RBC QN AUTO: 30.4 PG (ref 26.8–34.3)
MCHC RBC AUTO-ENTMCNC: 30.3 G/DL (ref 31.4–37.4)
MCV RBC AUTO: 100 FL (ref 82–98)
MONOCYTES # BLD AUTO: 1.46 THOUSAND/ΜL (ref 0.17–1.22)
MONOCYTES NFR BLD AUTO: 6 % (ref 4–12)
NASAL CANNULA: 2
NEUTROPHILS # BLD AUTO: 20.63 THOUSANDS/ΜL (ref 1.85–7.62)
NEUTS SEG NFR BLD AUTO: 88 % (ref 43–75)
NRBC BLD AUTO-RTO: 0 /100 WBCS
O2 CT BLDV-SCNC: 10.5 ML/DL
P AXIS: 41 DEGREES
PCO2 BLDV: 33.5 MM HG (ref 42–50)
PH BLDV: 7.27 [PH] (ref 7.3–7.4)
PLATELET # BLD AUTO: 159 THOUSANDS/UL (ref 149–390)
PMV BLD AUTO: 11.2 FL (ref 8.9–12.7)
PO2 BLDV: 36 MM HG (ref 35–45)
POTASSIUM SERPL-SCNC: 4.3 MMOL/L (ref 3.5–5.3)
PR INTERVAL: 206 MS
PR INTERVAL: 212 MS
PROCALCITONIN SERPL-MCNC: 0.23 NG/ML
PROT SERPL-MCNC: 5.4 G/DL (ref 6.4–8.2)
QRS AXIS: -15 DEGREES
QRS AXIS: 192 DEGREES
QRSD INTERVAL: 70 MS
QRSD INTERVAL: 78 MS
QT INTERVAL: 360 MS
QT INTERVAL: 404 MS
QTC INTERVAL: 466 MS
QTC INTERVAL: 488 MS
RBC # BLD AUTO: 3.42 MILLION/UL (ref 3.81–5.12)
SODIUM SERPL-SCNC: 143 MMOL/L (ref 136–145)
T WAVE AXIS: -34 DEGREES
T WAVE AXIS: 215 DEGREES
VENTRICULAR RATE: 101 BPM
VENTRICULAR RATE: 88 BPM
WBC # BLD AUTO: 23.37 THOUSAND/UL (ref 4.31–10.16)

## 2019-10-10 PROCEDURE — 02HV33Z INSERTION OF INFUSION DEVICE INTO SUPERIOR VENA CAVA, PERCUTANEOUS APPROACH: ICD-10-PCS | Performed by: ANESTHESIOLOGY

## 2019-10-10 PROCEDURE — 83605 ASSAY OF LACTIC ACID: CPT | Performed by: FAMILY MEDICINE

## 2019-10-10 PROCEDURE — 80053 COMPREHEN METABOLIC PANEL: CPT | Performed by: FAMILY MEDICINE

## 2019-10-10 PROCEDURE — 97163 PT EVAL HIGH COMPLEX 45 MIN: CPT

## 2019-10-10 PROCEDURE — NC001 PR NO CHARGE: Performed by: PHYSICIAN ASSISTANT

## 2019-10-10 PROCEDURE — G8987 SELF CARE CURRENT STATUS: HCPCS

## 2019-10-10 PROCEDURE — 93010 ELECTROCARDIOGRAM REPORT: CPT | Performed by: INTERNAL MEDICINE

## 2019-10-10 PROCEDURE — 84145 PROCALCITONIN (PCT): CPT | Performed by: PHYSICIAN ASSISTANT

## 2019-10-10 PROCEDURE — 99223 1ST HOSP IP/OBS HIGH 75: CPT | Performed by: INTERNAL MEDICINE

## 2019-10-10 PROCEDURE — 82805 BLOOD GASES W/O2 SATURATION: CPT | Performed by: PHYSICIAN ASSISTANT

## 2019-10-10 PROCEDURE — 83735 ASSAY OF MAGNESIUM: CPT | Performed by: FAMILY MEDICINE

## 2019-10-10 PROCEDURE — 83605 ASSAY OF LACTIC ACID: CPT | Performed by: STUDENT IN AN ORGANIZED HEALTH CARE EDUCATION/TRAINING PROGRAM

## 2019-10-10 PROCEDURE — 99232 SBSQ HOSP IP/OBS MODERATE 35: CPT | Performed by: INTERNAL MEDICINE

## 2019-10-10 PROCEDURE — 99291 CRITICAL CARE FIRST HOUR: CPT | Performed by: ANESTHESIOLOGY

## 2019-10-10 PROCEDURE — G8978 MOBILITY CURRENT STATUS: HCPCS

## 2019-10-10 PROCEDURE — 93306 TTE W/DOPPLER COMPLETE: CPT | Performed by: INTERNAL MEDICINE

## 2019-10-10 PROCEDURE — 97167 OT EVAL HIGH COMPLEX 60 MIN: CPT

## 2019-10-10 PROCEDURE — 87081 CULTURE SCREEN ONLY: CPT | Performed by: PHYSICIAN ASSISTANT

## 2019-10-10 PROCEDURE — 71045 X-RAY EXAM CHEST 1 VIEW: CPT

## 2019-10-10 PROCEDURE — 93306 TTE W/DOPPLER COMPLETE: CPT

## 2019-10-10 PROCEDURE — G8979 MOBILITY GOAL STATUS: HCPCS

## 2019-10-10 PROCEDURE — 85025 COMPLETE CBC W/AUTO DIFF WBC: CPT | Performed by: FAMILY MEDICINE

## 2019-10-10 PROCEDURE — NC001 PR NO CHARGE: Performed by: ANESTHESIOLOGY

## 2019-10-10 PROCEDURE — 83605 ASSAY OF LACTIC ACID: CPT | Performed by: PHYSICIAN ASSISTANT

## 2019-10-10 PROCEDURE — 36556 INSERT NON-TUNNEL CV CATH: CPT | Performed by: PHYSICIAN ASSISTANT

## 2019-10-10 PROCEDURE — 99233 SBSQ HOSP IP/OBS HIGH 50: CPT | Performed by: FAMILY MEDICINE

## 2019-10-10 PROCEDURE — G8988 SELF CARE GOAL STATUS: HCPCS

## 2019-10-10 PROCEDURE — 93005 ELECTROCARDIOGRAM TRACING: CPT

## 2019-10-10 PROCEDURE — 71275 CT ANGIOGRAPHY CHEST: CPT

## 2019-10-10 RX ORDER — CEFEPIME HYDROCHLORIDE 1 G/50ML
1000 INJECTION, SOLUTION INTRAVENOUS EVERY 24 HOURS
Status: DISCONTINUED | OUTPATIENT
Start: 2019-10-10 | End: 2019-10-10

## 2019-10-10 RX ORDER — SODIUM CHLORIDE, SODIUM GLUCONATE, SODIUM ACETATE, POTASSIUM CHLORIDE, MAGNESIUM CHLORIDE, SODIUM PHOSPHATE, DIBASIC, AND POTASSIUM PHOSPHATE .53; .5; .37; .037; .03; .012; .00082 G/100ML; G/100ML; G/100ML; G/100ML; G/100ML; G/100ML; G/100ML
50 INJECTION, SOLUTION INTRAVENOUS CONTINUOUS
Status: DISCONTINUED | OUTPATIENT
Start: 2019-10-10 | End: 2019-10-11

## 2019-10-10 RX ORDER — VANCOMYCIN HYDROCHLORIDE 1 G/200ML
15 INJECTION, SOLUTION INTRAVENOUS EVERY 12 HOURS
Status: DISCONTINUED | OUTPATIENT
Start: 2019-10-10 | End: 2019-10-10 | Stop reason: DRUGHIGH

## 2019-10-10 RX ORDER — SODIUM CHLORIDE, SODIUM GLUCONATE, SODIUM ACETATE, POTASSIUM CHLORIDE, MAGNESIUM CHLORIDE, SODIUM PHOSPHATE, DIBASIC, AND POTASSIUM PHOSPHATE .53; .5; .37; .037; .03; .012; .00082 G/100ML; G/100ML; G/100ML; G/100ML; G/100ML; G/100ML; G/100ML
75 INJECTION, SOLUTION INTRAVENOUS CONTINUOUS
Status: DISCONTINUED | OUTPATIENT
Start: 2019-10-10 | End: 2019-10-10

## 2019-10-10 RX ORDER — VANCOMYCIN HYDROCHLORIDE 1 G/200ML
15 INJECTION, SOLUTION INTRAVENOUS EVERY 24 HOURS
Status: DISCONTINUED | OUTPATIENT
Start: 2019-10-10 | End: 2019-10-10

## 2019-10-10 RX ORDER — SODIUM CHLORIDE, SODIUM GLUCONATE, SODIUM ACETATE, POTASSIUM CHLORIDE, MAGNESIUM CHLORIDE, SODIUM PHOSPHATE, DIBASIC, AND POTASSIUM PHOSPHATE .53; .5; .37; .037; .03; .012; .00082 G/100ML; G/100ML; G/100ML; G/100ML; G/100ML; G/100ML; G/100ML
500 INJECTION, SOLUTION INTRAVENOUS ONCE
Status: COMPLETED | OUTPATIENT
Start: 2019-10-10 | End: 2019-10-10

## 2019-10-10 RX ORDER — MAGNESIUM SULFATE HEPTAHYDRATE 40 MG/ML
2 INJECTION, SOLUTION INTRAVENOUS ONCE
Status: COMPLETED | OUTPATIENT
Start: 2019-10-10 | End: 2019-10-10

## 2019-10-10 RX ORDER — CEFTRIAXONE 1 G/50ML
1000 INJECTION, SOLUTION INTRAVENOUS EVERY 24 HOURS
Status: DISCONTINUED | OUTPATIENT
Start: 2019-10-11 | End: 2019-10-12

## 2019-10-10 RX ORDER — LISINOPRIL 10 MG/1
10 TABLET ORAL EVERY MORNING
Status: DISCONTINUED | OUTPATIENT
Start: 2019-10-11 | End: 2019-10-10

## 2019-10-10 RX ORDER — ACETAMINOPHEN 325 MG/1
650 TABLET ORAL ONCE
Status: COMPLETED | OUTPATIENT
Start: 2019-10-10 | End: 2019-10-10

## 2019-10-10 RX ORDER — ESCITALOPRAM OXALATE 5 MG/1
5 TABLET ORAL DAILY
Status: DISCONTINUED | OUTPATIENT
Start: 2019-10-10 | End: 2019-10-12

## 2019-10-10 RX ADMIN — LISINOPRIL 20 MG: 20 TABLET ORAL at 09:20

## 2019-10-10 RX ADMIN — ONDANSETRON 4 MG: 2 INJECTION INTRAMUSCULAR; INTRAVENOUS at 19:45

## 2019-10-10 RX ADMIN — SODIUM CHLORIDE, SODIUM GLUCONATE, SODIUM ACETATE, POTASSIUM CHLORIDE AND MAGNESIUM CHLORIDE 500 ML: 526; 502; 368; 37; 30 INJECTION, SOLUTION INTRAVENOUS at 03:07

## 2019-10-10 RX ADMIN — IOHEXOL 85 ML: 350 INJECTION, SOLUTION INTRAVENOUS at 15:09

## 2019-10-10 RX ADMIN — ACETAMINOPHEN 650 MG: 325 TABLET, FILM COATED ORAL at 07:06

## 2019-10-10 RX ADMIN — ENOXAPARIN SODIUM 60 MG: 60 INJECTION SUBCUTANEOUS at 14:52

## 2019-10-10 RX ADMIN — HEPARIN SODIUM 5000 UNITS: 5000 INJECTION INTRAVENOUS; SUBCUTANEOUS at 05:50

## 2019-10-10 RX ADMIN — SODIUM CHLORIDE, SODIUM GLUCONATE, SODIUM ACETATE, POTASSIUM CHLORIDE AND MAGNESIUM CHLORIDE 50 ML/HR: 526; 502; 368; 37; 30 INJECTION, SOLUTION INTRAVENOUS at 22:07

## 2019-10-10 RX ADMIN — VANCOMYCIN HYDROCHLORIDE 1000 MG: 1 INJECTION, SOLUTION INTRAVENOUS at 07:12

## 2019-10-10 RX ADMIN — Medication 1 TABLET: at 09:20

## 2019-10-10 RX ADMIN — HEPARIN SODIUM 5000 UNITS: 5000 INJECTION INTRAVENOUS; SUBCUTANEOUS at 13:07

## 2019-10-10 RX ADMIN — ESCITALOPRAM 5 MG: 5 TABLET, FILM COATED ORAL at 12:38

## 2019-10-10 RX ADMIN — SODIUM CHLORIDE, SODIUM GLUCONATE, SODIUM ACETATE, POTASSIUM CHLORIDE AND MAGNESIUM CHLORIDE 125 ML/HR: 526; 502; 368; 37; 30 INJECTION, SOLUTION INTRAVENOUS at 15:42

## 2019-10-10 RX ADMIN — MAGNESIUM SULFATE HEPTAHYDRATE 2 G: 40 INJECTION, SOLUTION INTRAVENOUS at 12:38

## 2019-10-10 RX ADMIN — TAMOXIFEN CITRATE 20 MG: 10 TABLET, FILM COATED ORAL at 09:20

## 2019-10-10 RX ADMIN — DONEPEZIL HYDROCHLORIDE 5 MG: 5 TABLET ORAL at 21:17

## 2019-10-10 RX ADMIN — ACETAMINOPHEN 650 MG: 325 TABLET, FILM COATED ORAL at 13:07

## 2019-10-10 RX ADMIN — METOPROLOL TARTRATE 25 MG: 25 TABLET, FILM COATED ORAL at 10:18

## 2019-10-10 RX ADMIN — ASPIRIN 81 MG: 81 TABLET, COATED ORAL at 09:19

## 2019-10-10 RX ADMIN — MEMANTINE 5 MG: 10 TABLET ORAL at 09:20

## 2019-10-10 RX ADMIN — PANTOPRAZOLE SODIUM 40 MG: 40 TABLET, DELAYED RELEASE ORAL at 05:48

## 2019-10-10 RX ADMIN — CEFEPIME HYDROCHLORIDE 1000 MG: 1 INJECTION, SOLUTION INTRAVENOUS at 11:09

## 2019-10-10 RX ADMIN — SODIUM CHLORIDE, SODIUM GLUCONATE, SODIUM ACETATE, POTASSIUM CHLORIDE AND MAGNESIUM CHLORIDE 500 ML: 526; 502; 368; 37; 30 INJECTION, SOLUTION INTRAVENOUS at 05:45

## 2019-10-10 RX ADMIN — SODIUM CHLORIDE, SODIUM GLUCONATE, SODIUM ACETATE, POTASSIUM CHLORIDE AND MAGNESIUM CHLORIDE 75 ML/HR: 526; 502; 368; 37; 30 INJECTION, SOLUTION INTRAVENOUS at 04:26

## 2019-10-10 NOTE — CONSULTS
Consult- Melyssa Reilly 12/17/1930, 80 y o  female MRN: [id number]    Unit/Bed#: [address] [place] [address] Encounter: [id number]    Primary Care Provider: Andrea Fuller MD   Date and time admitted to hospital: 10/9/2019  9:24 AM      Consults    * Severe sepsis ([address] )  Assessment & Plan  - Evidenced by Suspective UTI, Elevated lactic, Tachycardia, Elevated Cr  (on admission), Leukocytosis, and drop in SBP >40 pts  - Likely d/t UTI, UA showed mod  Bacteria, nitrite positive, initially given Cefepime in ED but narrowed to Ceftriaxone yesterday d/t history of pan-sensitive e  Coli uti    - Source could also be from liver lesions, consider RUQ U/S  - Critical care consulted now d/t difficulty clearing lactic, WBC uptrending, borderline blood pressures, and transaminitis  - Continue fluid resuscitation given 1L Isolyte and changed maintenance fluids to isolyte @ 75ml/hr  - Agree with broadening abx coverage    - Obtain procalcitonin, Blood and Urine cultures pending    - Pt remains HD stable and appears non-toxic on physical exam, in no acute distress, Afebrile, /55, HR 90, RR 20, spo2 >94% 2LNC,   - Continue M/S with telemetry, pt is not currently at risk for impending deterioration  - Critical care will continue to monitor  Type 2 myocardial infarction [facility])  Assessment & Plan  - Troponin peaked at  54, now trending down, aspirin ordered, Cardiology following   - Echo pending    Liver mass  Assessment & Plan  - Now with new Transaminitis, would recommend RUQ U/S of liver mass      -------------------------------------------------------------------------------------------------------------  Chief Complaint:     History of Present Illness   HX and PE limited by: Dementia,     HPI per chart review and staff discussion  Melyssa Reilly is a 80 y o  female with pmh of Mod  dementia, Rt Breast CA s/p mastectomy, and HTN  Initially presented to ED with complaints of generalized weakness and diarrhea   The patient met severe sepsis criteria with a lactic of 4 1, elevated Cr , Leukocytosis, tachycardia, and a nitrite (+) UA consistent with a UTI  Pt was bolus the 30ml/kg and met all sepsis parameters, over the next 24 hours, lactic began to trend down but remained in the high 2s, SBP low after receiving evening dose of metoprolol remaining around 100,  Additional boluses were given without significant change  This AM labs returned with increasing Leukocytosis and new transaminitis  Critical care consulted for recommendations for BP and Lactic clearance  History obtained from nursing and chart review  -------------------------------------------------------------------------------------------------------------  Dispo: Continue Med-surg with telemetry    Code Status: Level 3 - DNAR and DNI  --------------------------------------------------------------------------------------------------------------  Review of Systems   Unable to perform ROS: Dementia   All other systems reviewed and are negative  A 12-point, complete review of systems was reviewed and negative except as stated above     Physical Exam   Constitutional: She appears well-developed  No distress  HENT:   Head: Normocephalic and atraumatic  Eyes: Pupils are equal, round, and reactive to light  Conjunctivae are normal    Neck: Normal range of motion  Cardiovascular: Normal rate, regular rhythm, normal heart sounds and intact distal pulses  No murmur heard  Pulmonary/Chest: Effort normal and breath sounds normal  No respiratory distress  She has no wheezes  Abdominal: Soft  Bowel sounds are normal  She exhibits no distension  There is no tenderness  Musculoskeletal: Normal range of motion  She exhibits no edema  Neurological: She is alert  Follows commands, oriented to person and place, disoriented to time   Skin: Skin is warm  Capillary refill takes less than 2 seconds  She is not diaphoretic  --------------------------------------------------------------------------------------------------------------  Historical Information   Past Medical History:   Diagnosis Date    Arthritis     Dementia ([address] )     Hypertension     Incontinent of urine     Varicose veins of legs      Past Surgical History:   Procedure Laterality Date    BREAST EXCISIONAL BIOPSY Right     CATARACT EXTRACTION W/ INTRAOCULAR LENS IMPLANT Right 01/05/2016    EYE SURGERY      lazy eye    MASTECTOMY      MODIFIED RADICAL MASTECTOMY W/ AXILLARY LYMPH NODE DISSECTION Right 6/15/2016    Procedure: MASTECTOMY MODIFIED RADICAL (WITHOUT SENTINEL NODE BIOPSY); Surgeon: Joanne Parker MD;  Location: [address] [place] [address];  Service:     OOPHORECTOMY Right     1965     [facility] CATARACT EXTRACAP,INSERT LENS Left 2/23/2016    Procedure: EXTRACTION EXTRACAPSULAR CATARACT PHACO INTRAOCULAR LENS (IOL); Surgeon: Eli Guidry MD;  Location: [facility];  Service: Ophthalmology    TONSILLECTOMY  [facility] [address] BREAST BIOPSY RIGHT COMPLETE Right 5/3/2016     Social History   Social History     Substance and Sexual Activity   Alcohol Use Not Currently    Comment: socially     Social History     Substance and Sexual Activity   Drug Use No     Social History     Tobacco Use   Smoking Status Never Smoker   Smokeless Tobacco Never Used     Exercise History: ambulates with assistance    Family History: I have reviewed this patient's family history and commented on sigificant items within the HPI    Vitals:   Vitals:    10/10/19 0237 10/10/19 0338 10/10/19 0427 10/10/19 0442   BP: 95/64 101/62 99/56 100/55   BP Location: Left arm Left arm Left arm Left arm   Pulse: 97 94 93 89   Resp: 20 20 20 20   Temp: (!) 97 1 °F (36 2 °C) 97 9 °F (36 6 °C) 98 7 °F (37 1 °C) 99 4 °F (37 4 °C)   TempSrc: Temporal Temporal Temporal Temporal   SpO2: 92% 94% 94% 93%   Weight:       Height:         Temp  Min: 96 2 °F (35 7 °C)  Max: 99 5 °F (37 5 °C)  IBW: 50 1 kg  Height: 5' 2" (157 5 cm)  Body mass index is 23 76 kg/m²  Medications:  Current Facility-Administered Medications   Medication Dose Route Frequency    acetaminophen (TYLENOL) tablet 650 mg  650 mg Oral Q6H PRN    aspirin (ECOTRIN LOW STRENGTH) EC tablet 81 mg  81 mg Oral Daily    brimonidine-timolol (COMBIGAN) 0 2-0 5 % ophthalmic solution 1 drop  1 drop Both Eyes Daily    cefepime (MAXIPIME) 2,000 mg in dextrose 5 % 50 mL IVPB  2,000 mg Intravenous Q12H    donepezil (ARICEPT) tablet 5 mg  5 mg Oral HS    heparin (porcine) subcutaneous injection 5,000 Units  5,000 Units Subcutaneous Q8H [facility]    lisinopril (ZESTRIL) tablet 20 mg  20 mg Oral QAM    LORazepam (ATIVAN) tablet 0 5 mg  0 5 mg Oral Q8H PRN    memantine (NAMENDA) tablet 5 mg  5 mg Oral Daily    metoprolol tartrate (LOPRESSOR) tablet 25 mg  25 mg Oral Q12H [facility]    multi-electrolyte (ISOLYTE-S PH 7 4) bolus 500 mL  500 mL Intravenous Once    multi-electrolyte (PLASMALYTE-A/ISOLYTE-S PH 7 4) IV solution  75 mL/hr Intravenous Continuous    multivitamin-minerals (CENTRUM) tablet 1 tablet  1 tablet Oral Daily    ondansetron (ZOFRAN) injection 4 mg  4 mg Intravenous Q6H PRN    pantoprazole (PROTONIX) EC tablet 40 mg  40 mg Oral Early Morning    pneumococcal 13-valent conjugate vaccine (PREVNAR-13) IM injection 0 5 mL  0 5 mL Intramuscular Prior to discharge    tamoxifen (NOLVADEX) tablet 20 mg  20 mg Oral Daily    vancomycin (VANCOCIN) IVPB (premix) 1,000 mg  15 mg/kg Intravenous Q12H     Home medications:  Prior to Admission Medications   Prescriptions Last Dose Informant Patient Reported? Taking?    COMBIGAN 0 2-0 5 % 10/8/2019 at Unknown time Child Yes Yes   LORazepam (ATIVAN) 0 5 mg tablet Not Taking at Unknown time Child No No   Sig: Take 1 tablet (0 5 mg total) by mouth every 8 (eight) hours as needed for anxiety   Patient not taking: Reported on 10/9/2019   Memantine HCl ER (NAMENDA XR) 7 MG CP24 Not Taking at Unknown time Child Yes No   Sig: Take 1 tablet by mouth every morning  Multiple Vitamin (MULTIVITAMIN) tablet 10/8/2019 at Unknown time  Yes Yes   Sig: Take 1 tablet by mouth daily   aspirin 81 MG tablet 10/8/2019 at Unknown time Child Yes Yes   Sig: Take 81 mg by mouth every morning  donepezil (ARICEPT) 5 mg tablet 10/8/2019 at Unknown time Child No Yes   Sig: Take 1 tablet (5 mg total) by mouth daily at bedtime   fluocinolone (SYNALAR) 0 025 % cream Not Taking at Unknown time Child Yes No   lisinopril (ZESTRIL) 20 mg tablet Not Taking at Unknown time Child Yes No   Sig: Take 20 mg by mouth every morning       memantine (NAMENDA) 10 mg tablet 10/8/2019 at Unknown time Child Yes Yes   tamoxifen (NOLVADEX) 20 mg tablet 10/8/2019 at Unknown time Child No Yes   Sig: Take 1 tablet (20 mg total) by mouth daily      Facility-Administered Medications: None     Allergies:  No Known Allergies      Laboratory and Diagnostics:  Results from last 7 days   Lab Units 10/10/19  0431 10/09/19  0951   WBC Thousand/uL 23 37* 17 27*   HEMOGLOBIN g/dL 10 4* 11 7   HEMATOCRIT % 34 3* 39 0   PLATELETS Thousands/uL 159 182   NEUTROS PCT % 88* 77*   MONOS PCT % 6 4     Results from last 7 days   Lab Units 10/10/19  0431 10/09/19  0951   SODIUM mmol/L 143 141   POTASSIUM mmol/L 4 3 3 8   CHLORIDE mmol/L 115* 108   CO2 mmol/L 17* 20*   ANION GAP mmol/L 11 13   BUN mg/dL 22 25   CREATININE mg/dL 1 07 1 31*   CALCIUM mg/dL 7 0* 8 8   GLUCOSE RANDOM mg/dL 171* 189*   ALT U/L 114* 36   AST U/L 86* 33   ALK PHOS U/L 41* 49   ALBUMIN g/dL 2 7* 3 6   TOTAL BILIRUBIN mg/dL 0 60 0 40     Results from last 7 days   Lab Units 10/10/19  0431   MAGNESIUM mg/dL 1 7      Results from last 7 days   Lab Units 10/09/19  0951   INR  1 04   PTT seconds 22*      Results from last 7 days   Lab Units 10/09/19  2010 10/09/19  1658 10/09/19  1356 10/09/19  0951   TROPONIN I ng/mL 0 45* 0 54* 0 47* 0 45*     Results from last 7 days   Lab Units 10/10/19  0431 10/10/19  0156 10/09/19  9394 10/09/19  2009 10/09/19  1631 10/09/19  1356 10/09/19  1157   LACTIC ACID mmol/L 2 8* 2 7* 2 9* 3 1* 3 0* 2 2* 2 8*     ABG:    VBG:            Micro:    Bcx: pending  Ucx: pending  Stool PCR: Not collected      EKG: Sinus tachycardia  Possible Left atrial enlargement  Inferior infarct , age undetermined  Anterior infarct , age undetermined  ST & T wave abnormality, consider lateral ischemia  Abnormal ECG    Imaging:  10/9 CT CAP: There are several indeterminate but potentially suspicious liver masses which might be metastatic disease  There also appear to be a couple liver cysts  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  There is a hiatal hernia  Right mastectomy  Indeterminate but probably cystic left renal lesion measures 2 9 cm      Mild colonic diverticulosis I have personally reviewed pertinent reports  ------------------------------------------------------------------------------------------------------------  Advance Directive and Living Will:      Power of :    POLST:    ------------------------------------------------------------------------------------------------------------  Counseling / Coordination of Care  Total time spent today 45 minutes  Greater than 50% of total time was spent with the patient and / or family counseling and / or coordination of care  A description of the counseling / coordination of care:        Gisel Lambert PA-C        Portions of the record may have been created with voice recognition software  Occasional wrong word or "sound a like" substitutions may have occurred due to the inherent limitations of voice recognition software    Read the chart carefully and recognize, using context, where substitutions have occurred

## 2019-10-10 NOTE — PROGRESS NOTES
Notified of pt's lactic acidosis and labile pressures  Discussed case with ICU and fluids were changed to isolyte  Will also broaden coverage to Vancomycin and Cefepime  Blood and urine cultures pending

## 2019-10-10 NOTE — PROGRESS NOTES
Patient examined spoke with the nurse discussed with the daughter and son in low at bedside  Patient looks better with the behavior today she response to simple verbal command she communicates well and she reports that she felt hungry and she ate her breakfast well  Patient participated in her physical therapy  Patient is not angry upset and stubborn today she is taking her medications  Medical evaluation and treatment is in progress  Patient needs help with ADL care  She may benefit from adding antidepressant medications  I will start her on Lexapro 5 mg daily and continue Aricept Namenda as ordered  Patient is not lethargic not agitated not suicidal she is able to tell me her age 80 she knows that she is at the hospital   Spoke with the family at bedside  I will follow up

## 2019-10-10 NOTE — PROGRESS NOTES
Progress Note - Cardiology   [facility]     Elissa Wing 80 y o  female MRN: [id number]  : 1930  Unit/Bed#: [address] [place] [address] Encounter: [id number]    Assessment and Plan:   1  MI type 2 secondary to sepsis:  Peak troponin was 0 54  Anterior lateral ischemic changes on EKG have returned to baseline  No complaints offered  Given patient's poor functional status and Alzheimer's daughter dementia goals of care per family is for optimal medical therapy  Will continue beta-blocker and aspirin therapy  2  UTI/cystitis:  Continue IV fluids and antibiotics per the primary team     3  Dementia with behavioral disturbances, Alzheimer's:  Psychology consultation appreciated  4  History of right sided breast cancer with mastectomy:  Oncology consulted, patient is on tamoxifen  5  Abnormal CT scan of the liver with masses concerning for metastasis:  Managed per primary team and Oncology    6  Hypertension:  Blood pressures slightly low secondary to sepsis  Will decrease lisinopril to 10 mg daily and continue monitor  Subjective / Objective:   Patient seen and examined, she is sitting up in bed, she is calmer with family member in room  Still states no to questions regarding review of systems  Telemetry monitor reviewed and notes sinus tachycardia  Patient received IV boluses overnight, per sepsis protocol  Peak troponin was 0 54  Vitals: Blood pressure 110/74, pulse 98, temperature 98 3 °F (36 8 °C), temperature source Oral, resp  rate 20, height 5' 2" (1 575 m), weight 58 9 kg (129 lb 14 4 oz), SpO2 95 %, not currently breastfeeding  Vitals:    10/09/19 0932 10/09/19 1307   Weight: 58 1 kg (128 lb 1 4 oz) 58 9 kg (129 lb 14 4 oz)     Body mass index is 23 76 kg/m²    BP Readings from Last 3 Encounters:   10/10/19 110/74   19 140/70   19 128/78     Orthostatic Blood Pressures      Most Recent Value   Blood Pressure  110/74 filed at 10/10/2019 0415 Patient Position - Orthostatic VS  Lying filed at 10/10/2019 0759        I/O       10/08 0701 - 10/09 0700 10/09 0701 - 10/10 0700 10/10 0701 - 10/11 0700    P  O   240     I V  (mL/kg)  941 3 (16)     IV Piggyback  3050     Total Intake(mL/kg)  4231 3 (71 8)     Urine (mL/kg/hr)  600 200 (1 4)    Emesis/NG output  0     Total Output  600 200    Net  +3631 3 -200           Unmeasured Urine Occurrence  3 x 1 x    Unmeasured Emesis Occurrence  1 x         Invasive Devices     Peripheral Intravenous Line            Peripheral IV 10/09/19 Left Forearm less than 1 day    Peripheral IV 10/10/19 Left;Upper Arm less than 1 day                  Intake/Output Summary (Last 24 hours) at 10/10/2019 6182  Last data filed at 10/10/2019 0756  Gross per 24 hour   Intake 4231 25 ml   Output 800 ml   Net 3431 25 ml         Physical Exam:   Physical Exam   Constitutional: She appears well-developed and well-nourished  No distress  HENT:   Head: Normocephalic  Right Ear: External ear normal    Left Ear: External ear normal    Eyes: Pupils are equal, round, and reactive to light  Conjunctivae are normal  Right eye exhibits no discharge  Left eye exhibits no discharge  No scleral icterus  Neck: Normal range of motion  Neck supple  No JVD present  No thyromegaly present  Cardiovascular: Regular rhythm, intact distal pulses and normal pulses  No extrasystoles are present  Tachycardia present  Murmur heard  Systolic murmur is present with a grade of 1/6  Abdominal: Soft  Bowel sounds are normal  She exhibits no distension  Musculoskeletal: She exhibits no edema  Neurological: She is alert  She is disoriented  Skin: Skin is warm and dry  Capillary refill takes less than 2 seconds  She is not diaphoretic  Psychiatric: Her mood appears anxious  She is withdrawn  Cognition and memory are impaired  She expresses impulsivity  She is noncommunicative     Patient calmer with family member in room   Nursing note and vitals reviewed                  Medications/ Allergies:     Current Facility-Administered Medications:  acetaminophen 650 mg Oral Q6H PRN Maikol Singletary MD    aspirin 81 mg Oral Daily Kyung Revankar, DO    brimonidine-timolol 1 drop Both Eyes Daily Kyung Revankar, DO    cefepime 1,000 mg Intravenous Q24H Maikol Singletary MD Last Rate: Stopped (10/10/19 0704)   donepezil 5 mg Oral HS Kyung Revankar, DO    heparin (porcine) 5,000 Units Subcutaneous Q8H [address] Kyung Revankar, DO    [START ON 10/11/2019] lisinopril 10 mg Oral QAM RUTH Best    LORazepam 0 5 mg Oral Q8H PRN Kyung Revankar, DO    memantine 5 mg Oral Daily Kyung Revankar, DO    metoprolol tartrate 25 mg Oral Q12H [address] RUTH Best    multi-electrolyte 75 mL/hr Intravenous Continuous Maikol Singletary MD Last Rate: 75 mL/hr (10/10/19 0426)   multivitamin-minerals 1 tablet Oral Daily Kyung Revankar, DO    ondansetron 4 mg Intravenous Q6H PRN Kyung Revankar, DO    pantoprazole 40 mg Oral Early Morning Kyung Revankar, DO    pneumococcal 13-valent conjugate vaccine 0 5 mL Intramuscular Prior to discharge Kyung Revankar, DO    tamoxifen 20 mg Oral Daily Kyung Revankar, DO    vancomycin 15 mg/kg Intravenous Q24H Maikol Singletary MD Last Rate: 1,000 mg (10/10/19 4102)       acetaminophen 650 mg Q6H PRN   LORazepam 0 5 mg Q8H PRN   ondansetron 4 mg Q6H PRN   pneumococcal 13-valent conjugate vaccine 0 5 mL Prior to discharge     No Known Allergies    VTE Pharmacologic Prophylaxis:   Sequential compression device (Venodyne)     Labs:   Troponins:  Results from last 7 days   Lab Units 10/09/19  2010 10/09/19  1658 10/09/19  1356   TROPONIN I ng/mL 0 45* 0 54* 0 47*       CBC with diff:  Results from last 7 days   Lab Units 10/10/19  0431 10/09/19  0951   WBC Thousand/uL 23 37* 17 27*   HEMOGLOBIN g/dL 10 4* 11 7   HEMATOCRIT % 34 3* 39 0   MCV fL 100* 99*   PLATELETS Thousands/uL 159 182   MCH pg 30 4 29 7   MCHC g/dL 30 3* 30 0*   RDW % 15 3* 14 7   MPV fL 11 2 10 4   NRBC AUTO /100 WBCs 0 0       CMP:  Results from last 7 days   Lab Units 10/10/19  0431 10/09/19  0951   SODIUM mmol/L 143 141   POTASSIUM mmol/L 4 3 3 8   CHLORIDE mmol/L 115* 108   CO2 mmol/L 17* 20*   ANION GAP mmol/L 11 13   BUN mg/dL 22 25   CREATININE mg/dL 1 07 1 31*   CALCIUM mg/dL 7 0* 8 8   AST U/L 86* 33   ALT U/L 114* 36   ALK PHOS U/L 41* 49   TOTAL PROTEIN g/dL 5 4* 7 1   ALBUMIN g/dL 2 7* 3 6   TOTAL BILIRUBIN mg/dL 0 60 0 40   EGFR ml/min/1 73sq m 46 36     Magnesium:  Results from last 7 days   Lab Units 10/10/19  0431   MAGNESIUM mg/dL 1 7     Coags:  Results from last 7 days   Lab Units 10/09/19  0951   PTT seconds 22*   INR  1 04     NT-proBNP:   Recent Labs     10/09/19  0951   NTBNP 6,249*        Imaging & Testing   I have personally reviewed pertinent reports  Ct Chest Abdomen Pelvis Wo Contrast    Result Date: 10/9/2019  Narrative: CT CHEST, ABDOMEN AND PELVIS WITHOUT IV CONTRAST INDICATION:   poor historian, short of breath, intermittent abdominal pain, diarrhea  COMPARISON:  None  TECHNIQUE: CT examination of the chest, abdomen and pelvis was performed without intravenous contrast   Axial, sagittal, and coronal 2D reformatted images were created from the source data and submitted for interpretation  Radiation dose length product (DLP) for this visit:  441 69 mGy-cm   This examination, like all CT scans performed in the [facility], was performed utilizing techniques to minimize radiation dose exposure, including the use of iterative  reconstruction and automated exposure control  FINDINGS: CHEST LUNGS:  Lungs are clear  There is no tracheal or endobronchial lesion  PLEURA:  Tiny focus of pleural scarring laterally on the right HEART/GREAT VESSELS:  Heart size is normal   There are some coronary artery calcification  There is no pericardial effusion  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 which is 4 2 cm diameter   MEDIASTINUM AND RENETTA: There is a small hiatal hernia  CHEST WALL AND LOWER NECK:   Patient is status post right mastectomy  The left breast is unremarkable  There is no axillary lymphadenopathy  The base of the neck is unremarkable  ABDOMEN LIVER/BILIARY TREE:  There is a round low-density cystic-appearing lesion in the left hepatic lobe on image 46 measuring 13 mm  There are other small somewhat ill-defined low-density lesion seen elsewhere in the liver which are indeterminate  These might be metastatic lesions given the somewhat hazy appearance  Assessment is limited without contrast   There is a lesion posteriorly in the left hepatic lobe on image 54 which measures 9 x 14 mm  There is a round lesion in the right hepatic lobe on image 52 which measures 16 mm  There is a lesion medially in the lower portion of the right hepatic lobe on image 61 which is 8 x 10 mm  A more circumscribed benign-appearing low-density lesion near the fissure for the falciform ligament on image 63 is 12 mm and might be a cyst   There is no evidence of biliary dilatation  GALLBLADDER:  No calcified gallstones  No pericholecystic inflammatory change  SPLEEN:  The spleen is not seen  Correlate with surgical history  PANCREAS:  Unremarkable  ADRENAL GLANDS:  Unremarkable  KIDNEYS/URETERS:  Exophytic round smooth bordered low-density mass arising posteriorly from the left kidney is 2 9 cm diameter and measures 27 Hounsfield units internally  This is probably a cyst   There is no hydronephrosis or kidney stone on either side  No perinephric fluid  STOMACH AND BOWEL:  There is mild colonic diverticulosis without diverticulitis  There is no bowel obstruction  APPENDIX:  No findings to suggest appendicitis  ABDOMINOPELVIC CAVITY:  No ascites or free intraperitoneal air  No lymphadenopathy  VESSELS:  Atherosclerotic changes are present  No evidence of aneurysm  PELVIS REPRODUCTIVE ORGANS:  There is a pessary in the vagina  The uterus may be retroverted  No suspicious adnexal masses are seen  URINARY BLADDER:  Unremarkable  ABDOMINAL WALL/INGUINAL REGIONS:  Unremarkable  OSSEOUS STRUCTURES:  No acute fractures are seen  There is grade 1 anterolisthesis L5 on S1 due to chronic bilateral L5 pars interarticularis fractures  There is a slight chronic compression deformity superiorly at L3  There is multilevel disc degeneration the lumbar spine  There are no suspicious appearing lytic or blastic bone lesions or acute fractures seen  Impression: There are several indeterminate but potentially suspicious liver masses which might be metastatic disease  There also appear to be a couple liver cysts  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  There is a hiatal hernia  Right mastectomy  Indeterminate but probably cystic left renal lesion measures 2 9 cm  Mild colonic diverticulosis The study was marked in EPIC for immediate notification  Workstation performed: [id number]     Xr Chest 1 View Portable    Result Date: 10/9/2019  Narrative: CHEST INDICATION:   weakness  COMPARISON:  1/11/2018 EXAM PERFORMED/VIEWS:  XR CHEST PORTABLE FINDINGS:  Right axillary clips are noted  The heart is normal in size  There is a hiatal hernia  The lungs are clear  No pneumothorax or pleural effusion  Osseous structures appear within normal limits for patient age  Impression: No acute cardiopulmonary disease  Workstation performed: [id number]        EKG / Monitor: Personally reviewed  Sinus tachycardia    Cardiac testing:   No results found for this or any previous visit  No results found for this or any previous visit  No results found for this or any previous visit  No results found for this or any previous visit  RUTH Garcia        "This note has been constructed using a voice recognition system  Therefore there may be syntax, spelling, and/or grammatical errors   Please call if you have any questions  "

## 2019-10-10 NOTE — OCCUPATIONAL THERAPY NOTE
OT EVALUATION       10/10/19 1120   Note Type   Note type Eval only   Restrictions/Precautions   Other Precautions Cognitive; Chair Alarm; Bed Alarm; Fall Risk   Pain Assessment   Pain Assessment No/denies pain   Home Living   Type of [address] One level  (stair glide )   Bathroom Shower/Tub Walk-in shower   Bathroom Equipment Shower chair   Home Equipment Walker   Additional Comments in last week pt needed assist for ADLS and mobility  Patient with declining mobility per family      Prior Function   Level of [place] Needs assistance with IADLs  (independent with RW, assist for ADLS recently )   Lives With [facility] Help From Family   ADL Assistance Needs assistance   IADLs Needs assistance   ADL   Eating Assistance 4  Minimal Assistance   Grooming Assistance 4  Minimal Assistance   UB Bathing Assistance 4  Minimal Assistance   LB Bathing Assistance 3  Moderate Assistance   UB Dressing Assistance 4  Minimal Assistance    [facility] [address] 3  Moderate [address]  3  Moderate Assistance   Bed Mobility   Supine to Sit 3  Moderate assistance   Transfers   Sit to Stand 3  Moderate assistance   Additional items Assist x 1;Verbal cues   Stand to Sit 3  Moderate assistance   Additional items Assist x 1;Verbal cues   Functional Mobility   Functional Mobility 3  Moderate assistance   Additional Comments few steps bed to chair    Additional items Rolling walker   Balance   Static Sitting Fair -   Dynamic Sitting Poor   Static Standing Poor   Dynamic Standing Poor   Activity Tolerance   Activity Tolerance Patient limited by fatigue  (SOB with activity spO2 95% on O2 )   RUE Assessment   RUE Assessment WFL  (grossly 4-/5 MMT)   LUE Assessment   LUE Assessment WFL  (grossly 4-/5 MMT)   Cognition   Overall Cognitive Status Impaired   Arousal/Participation Cooperative   Attention Attends with cues to redirect   Orientation Level Oriented to person;Oriented to place   Following Commands Follows one step commands with increased time or repetition   Assessment   Limitation Decreased ADL status; Decreased UE strength;Decreased Safe judgement during ADL;Decreased endurance;Decreased self-care trans;Decreased high-level ADLs  (decreased balance and mobility )   Prognosis Good   Assessment Patient evaluated by Occupational Therapy  Patient admitted with Severe sepsis ([facility] [place] [address] )  The patients occupational profile, medical and therapy history includes a extensive additional review of physical, cognitive, or psychosocial history related to current functional performance  Comorbidities affecting functional mobility and ADLS include: urine incontinence, arthritis, dementia and hypertension  Prior to admission, patient was independent with functional mobility with walker, requiring assist for ADLS and requiring assist for IADLS  The evaluation identifies the following performance deficits: weakness, impaired balance, decreased endurance, increased fall risk, new onset of impairment of functional mobility, decreased ADLS, decreased IADLS, decreased activity tolerance, decreased safety awareness, impaired judgement, decreased cognition and decreased strength, that result in activity limitations and/or participation restrictions  This evaluation requires clinical decision making of high complexity, because the patient presents with comorbidites that affect occupational performance and required significant modification of tasks or assistance with consideration of multiple treatment options  The Barthel Index was used as a functional outcome tool presenting with a score of 35, indicating marked limitations of functional mobility and ADLS  Patient will benefit from skilled Occupational Therapy services to address above deficits and facilitate a safe return to prior level of function     Goals   Patient Goals unable to state due to cognitive impairment    STG Time Frame   (1-7 days)   Short Term Goal Patient will increase standing tolerance to 2 minutes during ADL task to decrease assistance level and decrease fall risk; Patient will increase bed mobility to min assist in preparation for ADLS and transfers; Patient will increase functional mobility to and from bathroom with rolling walker with min assist to increase performance with ADLS and to use a toilet; Patient will tolerate 10 minutes of UE ROM/strengthening to increase general activity tolerance and performance in ADLS/IADLS; Patient will improve functional activity tolerance to 10 minutes of sustained functional tasks to increase participation in basic self-care and decrease assistance level;  Patient will be able to to verbalize understanding and perform energy conservation/proper body mechanics during ADLS and functional mobility at least 75% of the time with minimal cueing to decrease signs of fatigue and increase stamina to return to prior level of function; Patient will increase dynamic sitting balance to fair to improve the ability to sit at edge of bed or on a chair for ADLS;  Patient will increase dynamic standing balance to poor+ to improve postural stability and decrease fall risk during standing ADLS and transfers  LTG Time Frame   (8-14 days)   Long Term Goal Patient will increase standing tolerance to 4 minutes during ADL task to decrease assistance level and decrease fall risk; Patient will increase bed mobility to supervision in preparation for ADLS and transfers;  Patient will increase functional mobility to and from bathroom with rolling walker with supervision to increase performance with ADLS and to use a toilet; Patient will tolerate 20 minutes of UE ROM/strengthening to increase general activity tolerance and performance in ADLS/IADLS; Patient will improve functional activity tolerance to 20 minutes of sustained functional tasks to increase participation in basic self-care and decrease assistance level;  Patient will be able to to verbalize understanding and perform energy conservation/proper body mechanics during ADLS and functional mobility at least 90% of the time without cueing to decrease signs of fatigue and increase stamina to return to prior level of function; Patient will increase dynamic sitting balance to fair+ to improve the ability to sit at edge of bed or on a chair for ADLS;  Patient will increase dynamic standing balance to fair- to improve postural stability and decrease fall risk during standing ADLS and transfers  Functional Transfer Goals   Pt Will Perform All Functional Transfers   (STG min assist LTG supervision )   ADL Goals   Pt Will Perform Eating   (STG supervision LTG independent )   Pt Will Perform Grooming   (STG supervision LTG independent )   Pt Will Perform Bathing   (STG min assist LTG supervision )   Pt Will Perform UE Dressing   (STG supervision LTG independent )   Pt Will Perform LE Dressing   (STG min assist LTG supervision )   Pt Will Perform Toileting   (STG min assist LTG supervision )   Plan   Treatment Interventions ADL retraining;Functional transfer training;UE strengthening/ROM; Endurance training;Patient/family training;Equipment evaluation/education; Activityengagement; Compensatory technique education   Goal Expiration Date 10/24/19   OT Frequency 3-5x/wk   Recommendation   OT Discharge Recommendation Short Term Rehab   Barthel Index   Feeding 5   Bathing 0   Grooming Score 0   Dressing Score 5   Bladder Score 5   Bowels Score 10   Toilet Use Score 5   Transfers (Bed/Chair) Score 5   Mobility (Level Surface) Score 0   Stairs Score 0   Barthel Index Score 35   Licensure   [place] License Number  Valeria Rileycollette MS OTR/L [id number]

## 2019-10-10 NOTE — ASSESSMENT & PLAN NOTE
- Evidenced by Suspective UTI, Elevated lactic, Tachycardia, Elevated Cr  (on admission), Leukocytosis, and drop in SBP >40 pts  - Likely d/t UTI, UA showed mod  Bacteria, nitrite positive, initially given Cefepime in ED but narrowed to Ceftriaxone yesterday d/t history of pan-sensitive e  Coli uti    - Source could also be from liver lesions, consider RUQ U/S  - Critical care consulted now d/t difficulty clearing lactic, WBC uptrending, borderline blood pressures, and transaminitis  - Continue fluid resuscitation given 1L [place] and changed maintenance fluids to isolyte @ 75ml/hr  - Agree with broadening abx coverage    - Obtain procalcitonin, Blood and Urine cultures pending    - Pt remains HD stable and appears non-toxic on physical exam, in no acute distress, Afebrile, /55, HR 90, RR 20, spo2 >94% 2LNC,   - Continue M/S with telemetry, pt is not currently at risk for impending deterioration  - Critical care will continue to monitor

## 2019-10-10 NOTE — UTILIZATION REVIEW
Initial Clinical Review    Admission: Date/Time/Statement: Inpatient Admission Orders (From admission, onward)     Ordered        10/09/19 1223  Inpatient Admission (expected length of stay for this patient Order details is greater than two midnights)  Once                   Orders Placed This Encounter   Procedures    Inpatient Admission (expected length of stay for this patient Order details is greater than two midnights)     Standing Status:   Standing     Number of Occurrences:   1     Order Specific Question:   Admitting Physician     Answer:   Farhad Gomez [[id number]]     Order Specific Question:   Level of Care     Answer:   Med Surg [[id number]]     Order Specific Question:   Estimated length of stay     Answer:   More than 2 Midnights     Order Specific Question:   Certification     Answer:   I certify that inpatient services are medically necessary for this patient for a duration of greater than two midnights  See H&P and MD Progress Notes for additional information about the patient's course of treatment  ED Arrival Information     Expected Arrival Acuity Means of Arrival Escorted By Service Admission Type    - 10/9/2019 09:24 Urgent Ambulance [address] Urgent    Arrival Complaint    Weakness        Chief Complaint   Patient presents with    Weakness - Generalized     C/O Gen weakness X1 week   Daughter reports patient could not get out of shower this morning so EMS was called   Diarrhea     C/O diarrhea on off X 1 week  No recent antibiotic treatment      Assessment/Plan:   80-year-old female that presents today with generalized weakness and diarrhea  Patient has history of Alzheimer's disease, hypertension and for the past week has been feeling weak  Patient refuses to provide history  She is upset that she is in the hospital   Unable to obtain any history from the patient  History obtained from daughter at bedside    Daughter states that today she could not get out of the shower with diffuse weakness  States though she has been decompensating  States the patient is not stating any pain  States noticed that she she is having labored breathing  No cough no fevers did have diarrhea for a week  No recent antibiotics or hospitalization  Patient has no recent falls  No increased confusion  Severe sepsis ([address] )  Assessment & Plan  As noted by leukocytosis, tachycardia, lactic acidosis with lactic acid of 4 1 with source of infection being acute cystitis  Patient received 30 mL/kg fluid bolus in the ER  Trend lactate until normalizes  Continue IV fluids  Follow up blood cultures, urine culture  CT chest, abdomen/pelvis was negative for any infectious etiology  Acute cystitis  Assessment & Plan  Patient received a dose of IV cefepime in the ER  Prior and culture has grown E coli which was pansensitive    Place patient on IV Rocephin  Follow-up on pending urine culture    ED Triage Vitals [10/09/19 0932]   Temperature Pulse Respirations Blood Pressure SpO2   97 6 °F (36 4 °C) 103 (!) 24 152/66 95 %      Temp Source Heart Rate Source Patient Position - Orthostatic VS BP Location FiO2 (%)   Tympanic Monitor Lying Left arm --      Pain Score       No Pain        Wt Readings from Last 1 Encounters:   10/09/19 58 9 kg (129 lb 14 4 oz)     Additional Vital Signs:   0/09/19 1246  98 7 °F (37 1 °C)  98  22  162/74  98 %  None (Room air)  Lying   10/09/19 1215    102  17  189/83Abnormal   94 %  Nasal cannula  Lying   10/09/19 1200    94  24Abnormal   175/74Abnormal   94 %  Nasal cannula  Lying   10/09/19 1145  97 5 °F (36 4 °C)  94  21  195/72Abnormal   93 %  Nasal cannula  Lying   10/09/19 1115    102  23Abnormal   216/98Abnormal   94 %  Nasal cannula  Lying   10/09/19 1100    110Abnormal   24Abnormal   216/95Abnormal   95 %  Nasal cannula  Lying   10/09/19 1045  97 5 °F (36 4 °C)  92  26Abnormal   178/92Abnormal   98 %  Nasal cannula  Lying   10/09/19 1010    101  26Abnormal   166/81  96 %  Nasal cannula  Lying   10/09/19 1001            Nasal cannula     10/09/19 1000    98  26Abnormal   124/67  94 %  Nasal cannula  Lying   10/09/19 0932  97 6 °F (36 4 °C)  103  24Abnormal   152/66  95 %  None (Room air)  Lying   Pertinent Labs/Diagnostic Test Results:   Results from last 7 days   Lab Units 10/10/19  0431 10/09/19  0951   WBC Thousand/uL 23 37* 17 27*   HEMOGLOBIN g/dL 10 4* 11 7   HEMATOCRIT % 34 3* 39 0   PLATELETS Thousands/uL 159 182   NEUTROS ABS Thousands/µL 20 63* 13 26*     Results from last 7 days   Lab Units 10/10/19  0431 10/09/19  0951   SODIUM mmol/L 143 141   POTASSIUM mmol/L 4 3 3 8   CHLORIDE mmol/L 115* 108   CO2 mmol/L 17* 20*   ANION GAP mmol/L 11 13   BUN mg/dL 22 25   CREATININE mg/dL 1 07 1 31*   EGFR ml/min/1 73sq m 46 36   CALCIUM mg/dL 7 0* 8 8   MAGNESIUM mg/dL 1 7  --      Results from last 7 days   Lab Units 10/10/19  0431 10/09/19  0951   AST U/L 86* 33   ALT U/L 114* 36   ALK PHOS U/L 41* 49   TOTAL PROTEIN g/dL 5 4* 7 1   ALBUMIN g/dL 2 7* 3 6   TOTAL BILIRUBIN mg/dL 0 60 0 40     Results from last 7 days   Lab Units 10/10/19  0431 10/09/19  0951   GLUCOSE RANDOM mg/dL 171* 189*     Results from last 7 days   Lab Units 10/09/19  2010 10/09/19  1658 10/09/19  1356 10/09/19  0951   TROPONIN I ng/mL 0 45* 0 54* 0 47* 0 45*     Results from last 7 days   Lab Units 10/09/19  0951   PROTIME seconds 11 1   INR  1 04   PTT seconds 22*     Results from last 7 days   Lab Units 10/10/19  0642   PROCALCITONIN ng/ml 0 23     Results from last 7 days   Lab Units 10/10/19  1245 10/10/19  0827 10/10/19  0642 10/10/19  0431 10/10/19  0156 10/09/19  2309 10/09/19  2009   LACTIC ACID mmol/L 4 7* 2 1* 2 4* 2 8* 2 7* 2 9* 3 1*     Results from last 7 days   Lab Units 10/09/19  0951   NT-PRO BNP pg/mL 6,249*     Results from last 7 days   Lab Units 10/09/19  1053   CLARITY UA  Cloudy   COLOR UA  Yellow   SPEC GRAV UA  >=1 030   PH UA  5 5   GLUCOSE UA mg/dl Negative   KETONES UA mg/dl Trace*   BLOOD UA  Small*   PROTEIN UA mg/dl 30 (1+)*   NITRITE UA  Positive*   BILIRUBIN UA  Negative   UROBILINOGEN UA E U /dl 0 2   LEUKOCYTES UA  Small*   WBC UA /hpf 10-20*   RBC UA /hpf 0-1*   BACTERIA UA /hpf Moderate*   EPITHELIAL CELLS WET PREP /hpf Moderate*     Results from last 7 days   Lab Units 10/09/19  1053 10/09/19  0951 10/09/19  0946   BLOOD CULTURE   --  No Growth at 24 hrs  No Growth at 24 hrs  URINE CULTURE  >100,000 cfu/ml Gram Negative Marcelo*  --   --      CXR=No acute cardiopulmonary disease  CT CHEST, A/P=There are several indeterminate but potentially suspicious liver masses which might be metastatic disease  There also appear to be a couple liver cysts  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  There is a hiatal hernia  Right mastectomy  Indeterminate but probably cystic left renal lesion measures 2 9 cm    Mild colonic diverticulosis  EKG=Sinus rhythm with 1st degree A-V block  Low voltage QRS  Inferior infarct , age undetermined  Cannot rule out Anterior infarct , age undetermined  T wave abnormality, consider lateral ischemia  ED Treatment:   Medication Administration from 10/09/2019 0924 to 10/09/2019 1300       Date/Time Order Dose Route     10/09/2019 1008 sodium chloride 0 9 % bolus 1,000 mL 1,000 mL Intravenous     10/09/2019 1058 cefepime (MAXIPIME) 2 g/50 mL dextrose IVPB 2,000 mg Intravenous     10/09/2019 1205 sodium chloride 0 9 % bolus 800 mL 800 mL Intravenous        Past Medical History:   Diagnosis Date    Arthritis     D[facility] [address] [address] )     Hypertension     Incontinent of urine     Varicose veins of legs      Present on Admission:   Essential hypertension   Liver mass   Severe sepsis (HCC)   Weakness generalized   Type 2 myocardial infarction ([facility] [address] )   Acute cystitis   Moderate dementia with behavioral disturbance ([facility] )   Diarrhea  Admitting Diagnosis: UTI (urinary tract infection) [N39 0]  Weakness generalized [R53 1]  Liver mass [R16 0]  Elevated troponin [R79 89]  Severe sepsis (HCC) [A41 9, R65 20]  Age/Sex: 80 y o  female  Admission Orders:  TELEMETRY  CONSULT CARDIO  CONSULT PSYCHE  PT/OT EVAL & TX  CONSULT HEMATOLOGY  VENODYNES  STRAIGHT CATH  O2 TO KEEP SATS>92%  Medications:  aspirin 81 mg Oral Daily   brimonidine-timolol 1 drop Both Eyes Daily   cefepime 1,000 mg Intravenous Q24H   donepezil 5 mg Oral HS   escitalopram 5 mg Oral Daily   [START ON 10/11/2019] lisinopril 10 mg Oral QAM   memantine 5 mg Oral Daily   metoprolol tartrate 25 mg Oral Q12H [address]   multivitamin-minerals 1 tablet Oral Daily   pantoprazole 40 mg Oral Early Morning   tamoxifen 20 mg Oral Daily   vancomycin 15 mg/kg Intravenous Q24H     multi-electrolyte 75 mL/hr Intravenous Continuous     Network Utilization Review Department  Phone: [phone]; Fax [phone]  [email or web address]  ATTENTION: Please call with any questions or concerns to [phone] and carefully listen to the prompts so that you are directed to the right person  Send all requests for admission clinical reviews, approved or denied determinations and any other requests to fax [phone]   All voicemails are confidential

## 2019-10-10 NOTE — PROGRESS NOTES
[facility] Internal Medicine Progress Note  Patient: Zacarias Dozier 80 y o  female   MRN: [id number]  PCP: Cassie Garcia MD  Unit/Bed#: ICU 11 Encounter: [id number]  Date Of Visit: 10/11/19    Problem List:    Principal Problem:    Severe sepsis ([facility] [address] )  Active Problems:    Pulmonary embolism ([facility] [address] )    Type 2 myocardial infarction ([facility] [address] )    Acute cystitis    Essential hypertension    Malignant neoplasm of overlapping sites of right breast in female, estrogen receptor positive ([facility] [address] )    Liver mass    Weakness generalized    Moderate dementia with behavioral disturbance ([facility] [address] )    Transaminitis      Assessment & Plan:    * Severe sepsis ([facility] [address] )  Assessment & Plan  As noted by leukocytosis, tachycardia, lactic acidosis with lactic acid of 4 1 with source of infection being acute cystitis  Patient received 30 mL/kg fluid bolus in the ER  Leukocytosis has worsened today  Repeat lab work in a m  Patient still with lactic acidosis despite multiple fluid boluses which is most likely from her liver dysfunction/lesions  Lactic acid trended up to 4 earlier today but as per RN torniquet was applied  Continue IV fluids  blood cultures negative so far, urine culture growing greater than 100,000 E coli  CT chest, abdomen/pelvis was negative for any infectious etiology      Pulmonary embolism [facility])  Assessment & Plan  Patient noted to have large acute bilateral PE on CTA chest with right heart strain along with small bilateral pleural effusions  Patient is hemodynamically unstable with acute PE  Blood pressures have been soft/hypotensive despite multiple fluid boluses previously  1 time dose of weight based lovenox given to patient today   Based on creatinine clearance, patient can be started on heparin drip at ~ 3 pm on 10/11/19  Findings were discussed with patient's son and also ICU team  Care coordinated with ICU team and as patient's family is willing to try pressor support, patient transferred to ICU  Check venous Doppler    Acute cystitis  Assessment & Plan  Patient received a dose of IV cefepime in the ER  Prior culture has grown E coli which was pansensitive  Patient was started on on IV Rocephin and later switched to cefepime and vancomycin overnight  Vancomycin discontinued  Continue with IV cefepime  urine culture growing > 100,000 E coli        Type 2 myocardial infarction [facility])  Assessment & Plan  Patient's initial troponin noted to be 0 45 with new EKG changes in the setting of severe sepsis, cystitis, acute PE  Case was discussed with Cardiology and will manage medically for now  Troponin peaked at 0 54  Patient received a full dose of aspirin  Continue baby aspirin  Lopressor discontinued as patient noted to be hypotensive at times  echo showed normal EF with grade 1 diastolic dysfunction  Moderate pulmonary hypertension was noted along with increased RV pressures      Essential hypertension  Assessment & Plan  Lopressor and lisinopril discontinued as patient noted to be hypotensive at times     Moderate dementia with behavioral disturbance [facility])  Assessment & Plan  Continue Namenda, Aricept  Psychiatry consult appreciated  Lexapro started today    Weakness generalized  Assessment & Plan  Most likely due to severe sepsis, cystitis, acute PE, deconditioning  PT/OT while here    Liver mass  Assessment & Plan  On CT scan, patient noted to have several liver masses which could be metastatic disease  Few liver cysts were also noted  Oncology consulted  Patient with elevated liver enzymes today  Right upper quadrant ultrasound has been ordered    Malignant neoplasm of overlapping sites of right breast in female, estrogen receptor positive [facility])  Assessment & Plan  Patient is status post right mastectomy  Continue tamoxifen  Oncology consult as noted above      Diarrhearesolved as of 10/10/2019  Assessment & Plan  As per daughter, patient with diarrhea since day prior to admission  As per RN, patient had a solid bowel movement today    Patient seen on multiple occasions today and multiple updates provided to family along with coordination with ICU team   Patient's overall poor prognosis discussed with family    VTE Pharmacologic Prophylaxis:   Pharmacologic: Enoxaparin (Lovenox)  Mechanical VTE Prophylaxis in Place: No    Patient Centered Rounds: I have performed bedside rounds with nursing staff today  Discussions with Specialists or Other Care Team Provider: yes - cardio, ICU    Education and Discussions with Family / Patient: yes - son, daughter    Time Spent for Care: 1 hour  More than 50% of total time spent on counseling and coordination of care as described above  Current Length of Stay: 2 day(s)    Current Patient Status: Inpatient   Certification Statement: The patient will continue to require additional inpatient hospital stay due to Severe sepsis, acute cystitis, acute bilateral PE, elevated troponins    Discharge Plan: pending    Code Status: Level 3 - DNAR and DNI      Subjective:   As per family, patient appears very weak  Patient answers "no" to my questions  Objective:     Vitals:   Temp (24hrs), Av 7 °F (37 1 °C), Min:97 1 °F (36 2 °C), Max:99 5 °F (37 5 °C)    Temp:  [97 1 °F (36 2 °C)-99 5 °F (37 5 °C)] 97 9 °F (36 6 °C)  HR:  [75-98] 84  Resp:  [20-27] 23  BP: ()/(54-82) 119/59  SpO2:  [92 %-96 %] 95 %  Body mass index is 26 09 kg/m²  Input and Output Summary (last 24 hours): Intake/Output Summary (Last 24 hours) at 10/11/2019 0007  Last data filed at 10/10/2019 1536  Gross per 24 hour   Intake 2778 75 ml   Output 400 ml   Net 2378 75 ml       Physical Exam:     Physical Exam   Constitutional: No distress  HENT:   Head: Normocephalic and atraumatic  Eyes: EOM are normal  Right eye exhibits no discharge  Left eye exhibits no discharge  No scleral icterus  Neck: Neck supple  Cardiovascular: Normal rate and regular rhythm  Murmur heard    Pulmonary/Chest: Effort normal and breath sounds normal  No respiratory distress  She has no wheezes  She has no rales  Abdominal: Soft  Bowel sounds are normal  She exhibits no distension  There is no tenderness  Neurological: She is alert  No cranial nerve deficit  Oriented to self and place   Skin: She is not diaphoretic  Psychiatric: Cognition and memory are impaired  Additional Data:     Labs:    Results from last 7 days   Lab Units 10/10/19  0431   WBC Thousand/uL 23 37*   HEMOGLOBIN g/dL 10 4*   HEMATOCRIT % 34 3*   PLATELETS Thousands/uL 159   NEUTROS PCT % 88*   LYMPHS PCT % 5*   MONOS PCT % 6   EOS PCT % 0     Results from last 7 days   Lab Units 10/10/19  0431   POTASSIUM mmol/L 4 3   CHLORIDE mmol/L 115*   CO2 mmol/L 17*   BUN mg/dL 22   CREATININE mg/dL 1 07   CALCIUM mg/dL 7 0*   ALK PHOS U/L 41*   ALT U/L 114*   AST U/L 86*     Results from last 7 days   Lab Units 10/09/19  0951   INR  1 04       * I Have Reviewed All Lab Data Listed Above  * Additional Pertinent Lab Tests Reviewed: [facility]      Imaging:  Imaging Reports Reviewed Today Include: CTA chest, CT chest, abd/pelvis  Imaging Personally Reviewed by Myself Includes:  N/A    Recent Cultures (last 7 days):     Results from last 7 days   Lab Units 10/09/19  1053 10/09/19  0951 10/09/19  0946   BLOOD CULTURE   --  No Growth at 24 hrs  No Growth at 24 hrs     URINE CULTURE  >100,000 cfu/ml Escherichia coli*  --   --        Last 24 Hours Medication List:     Current Facility-Administered Medications:  acetaminophen 650 mg Oral Q6H PRN Jean-Paul Moreno PA-C    aspirin 81 mg Oral Daily Jean-Paul Moreno PA-C    brimonidine-timolol 1 drop Both Eyes Daily Taj Borges PA-C    cefTRIAXone 1,000 mg Intravenous Q24H Jean-Paul Moreno PA-C    donepezil 5 mg Oral HS Jean-Paul Moreno PA-C    epinephrine 1-10 mcg/min Intravenous Titrated Jean-Paul Moreno PA-C    escitalopram 5 mg Oral Daily Jean-Paul Moreno PA-C    memantine 5 mg Oral Daily Bharat, PA-C    multi-electrolyte 50 mL/hr Intravenous Continuous [place], [place] Last Rate: 50 mL/hr (10/10/19 2207)   multivitamin-minerals 1 tablet Oral Daily BharatSOLO mclain-C    ondansetron 4 mg Intravenous Q6H PRN Bharat PA-C    pneumococcal 13-valent conjugate vaccine 0 5 mL Intramuscular Prior to discharge TANNER Nicholson    tamoxifen 20 mg Oral Daily TANNER Nicholson           Today, Patient Was Seen By: Opal Llanes DO    ** Please Note: "This note has been constructed using a voice recognition system  Therefore there may be syntax, spelling, and/or grammatical errors   Please call if you have any questions  "**

## 2019-10-10 NOTE — PHYSICIAN ADVISOR
Current patient class: Inpatient  The patient is currently on Hospital Day: 2 at [address] [place] [address]      The patient was admitted to the hospital at [id number] on 10/9/19 for the following diagnosis:  UTI (urinary tract infection) [N39 0]  Weakness generalized [R53 1]  Liver mass [R16 0]  Elevated troponin [R79 89]  Severe sepsis ([address] ) [A41 9, R65 20]       There is documentation in the medical record of an expected length of stay of at least 2 midnights  The patient is therefore expected to satisfy the 2 midnight benchmark and given the 2 midnight presumption is appropriate for INPATIENT ADMISSION  Given this expectation of a satisfying stay, CMS instructs us that the patient is most often appropriate for inpatient admission under part A provided medical necessity is documented in the chart  After review of the relevant documentation, labs, vital signs and test results, the patient is appropriate for INPATIENT ADMISSION  Admission to the hospital as an inpatient is a complex decision making process which requires the practitioner to consider the patients presenting complaint, history and physical examination and all relevant testing  With this in mind, in this case, the patient was deemed appropriate for INPATIENT ADMISSION  After review of the documentation and testing available at the time of the admission I concur with this clinical determination of medical necessity  Rationale is as follows: The patient is a 80 yrs old Female who presented to the ED at 10/9/2019  9:24 AM with a chief complaint of Weakness - Generalized (C/O Gen weakness X1 week   Daughter reports patient could not get out of shower this morning so EMS was called  ) and Diarrhea (C/O diarrhea on off X 1 week  No recent antibiotic treatment )     Patient admitted with a report of generalized weakness and met severed sepsis  She was started on IV fluids and IV antibiotics    She has a history of dementia, breast cancer and new liver masses  Abnormal labs include a WBC of 17 27, lactate of 4 1 which has increased to 4 7, troponin of 0 45 which has increased to 0 54 and a UA revealed leukocytes, blood and nitrites  She is diagnosed as having sepsis secondary to a UTI and they are recommending a RUQ US do to new transaminitis  Cardiology evaluated the patient and also agreed with a NSTEMI type diagnosis as well as a UTI  The patient was also evaluated by Heme/Onc to get their input on the liver masses  With the noted ongoing acute care for this patient, a two night admission class to the hospital would be considered appropriate for her  The patients vitals on arrival were ED Triage Vitals [10/09/19 0932]   Temperature Pulse Respirations Blood Pressure SpO2   97 6 °F (36 4 °C) 103 (!) 24 152/66 95 %      Temp Source Heart Rate Source Patient Position - Orthostatic VS BP Location FiO2 (%)   Tympanic Monitor Lying Left arm --      Pain Score       No Pain           Past Medical History:   Diagnosis Date    Arthritis     Dementia ([address] )     Hypertension     Incontinent of urine     Varicose veins of legs      Past Surgical History:   Procedure Laterality Date    BREAST EXCISIONAL BIOPSY Right     CATARACT EXTRACTION W/ INTRAOCULAR LENS IMPLANT Right 01/05/2016    EYE SURGERY      lazy eye    MASTECTOMY      MODIFIED RADICAL MASTECTOMY W/ AXILLARY LYMPH NODE DISSECTION Right 6/15/2016    Procedure: MASTECTOMY MODIFIED RADICAL (WITHOUT SENTINEL NODE BIOPSY); Surgeon: Lesley Gamez MD;  Location: [address] [place] [address];  Service:     OOPHORECTOMY Right     1965     [facility] CATARACT EXTRACAP,INSERT LENS Left 2/23/2016    Procedure: EXTRACTION EXTRACAPSULAR CATARACT PHACO INTRAOCULAR LENS (IOL);   Surgeon: Cornel Bermudez MD;  Location: [facility] MAIN OR;  Service: Ophthalmology    TONSILLECTOMY  1935    US GUIDED BREAST BIOPSY RIGHT COMPLETE Right 5/3/2016           Consults have been placed to:   IP CONSULT TO CARDIOLOGY  IP CONSULT TO PSYCHIATRY  IP CONSULT TO HEMATOLOGY  IP CONSULT TO MEDICAL CRITICAL CARE    Vitals:    10/10/19 0457 10/10/19 0759 10/10/19 1018 10/10/19 1315   BP: 103/59 110/74 117/65 104/55   BP Location: Left arm Left arm  Right arm   Pulse: 90 98 92 75   Resp: 20 20  (!) 23   Temp: 99 2 °F (37 3 °C) 98 3 °F (36 8 °C)  97 9 °F (36 6 °C)   TempSrc: Temporal Oral  Tympanic   SpO2: 92% 95%  95%   Weight:       Height:           Most recent labs:    Recent Labs     10/09/19  0951  10/09/19  2010 10/10/19  0431   WBC 17 27*  --   --  23 37*   HGB 11 7  --   --  10 4*   HCT 39 0  --   --  34 3*     --   --  159   K 3 8  --   --  4 3   CALCIUM 8 8  --   --  7 0*   BUN 25  --   --  22   CREATININE 1 31*  --   --  1 07   INR 1 04  --   --   --    TROPONINI 0 45*   < > 0 45*  --    AST 33  --   --  86*   ALT 36  --   --  114*   ALKPHOS 49  --   --  41*    < > = values in this interval not displayed         Scheduled Meds:  Current Facility-Administered Medications:  acetaminophen 650 mg Oral Q6H PRN Keven Cannon MD    aspirin 81 mg Oral Daily Kyung Revankar, DO    brimonidine-timolol 1 drop Both Eyes Daily Kyung Revankar, DO    cefepime 1,000 mg Intravenous Q24H Keven Cannon MD Last Rate: 1,000 mg (10/10/19 1109)   donepezil 5 mg Oral HS Kyung Revankar, DO    escitalopram 5 mg Oral Daily Holden Cid MD    LORazepam 0 5 mg Oral Q8H PRN Kyung Revankar, DO    memantine 5 mg Oral Daily Kyung Revankar, DO    metoprolol tartrate 25 mg Oral Q12H [address] Claudia Dalton, CRNP    multi-electrolyte 125 mL/hr Intravenous Continuous Claudia [place], CRNP    multivitamin-minerals 1 tablet Oral Daily Kyung Revankar, DO    ondansetron 4 mg Intravenous Q6H PRN Kyung Revankar, DO    pantoprazole 40 mg Oral Early Morning Kyung Frandyar, DO    pneumococcal 13-valent conjugate vaccine 0 5 mL Intramuscular Prior to discharge Kyung Walton, DO    tamoxifen 20 mg Oral Daily Kyung Wisear, DO    vancomycin 15 mg/kg Intravenous Q24H El Agosto MD Last Rate: 1,000 mg (10/10/19 7379)     Continuous Infusions:  multi-electrolyte 125 mL/hr     PRN Meds:   acetaminophen    LORazepam    ondansetron    pneumococcal 13-valent conjugate vaccine    Surgical procedures (if appropriate):

## 2019-10-10 NOTE — SOCIAL WORK
LOS 1  GLOS 4 8  Pt is a bundle for Sepsis  Bundle notice provided and reviewed with son  Pt is not a 30 day readmission  Spoke with pt's daughter Brandy Gilbert who provided information for initial assessment  Pt lives in a 1-level home with daughter and son  Stair glide to enter from garage  Prior to admission, pt ambulated with a RW and needed min assistance with bathing  All other ADLs pt able to do self but family is available to help when needed  Hx of VNA a few years ago but daughter unsure of agency name  No hx of STR  PCP- Dr Leslie Kiser  Preferred pharmacy- [facility] [address] in [place]  Pt's family transports pt to her medical appointments and daughter denies any barriers to obtaining or affording meds  Pt under PAD program for meds  A post acute care recommendation was made by care team for STR  Discussed Freedom of Choice with caregiver  List of facilities given to caregiver via in person  caregiver aware the list is custom filtered for them by zip code location and that [facility] post acute providers are designated  CM reviewed discharge planning process including the following: identifying caregivers at home, preference for d/c planning needs, availability of treatment team to discuss questions or concerns patient and/or family may have regarding plan of care and discharge planning   CM will continue to follow for care coordination and update assessment as appropriate

## 2019-10-10 NOTE — CONSULTS
Rocío Bates  12/17/1930    HEMATOLOGY/ONCOLOGY CONSULTATION REPORT  [facility]    DISCUSSION/SUMMARY:      69-year-old female with a history of stage IIIB right-sided breast cancer status post mastectomy on tamoxifen  Issues:    Sepsis  Patient has an elevated white count, left shift, presented with tachycardia and had an elevated lactic acid level  Patient has received IV fluids and is on antibiotics  Acute bilateral PE  Patient's respiratory status seems to be stable at this time  Patient is on Lovenox  Thrombophilia evaluation not presently indicated  Standard of care for patients with thromboses + metastatic disease (although not absolutely proven at this time) is usually low molecular weight heparin (Lovenox)  If there are no plans to workup the liver lesions and the Lovenox injections are difficult for the patient/family to do, I would consider using an oral agent eventually  Elevated troponins  Patient is now being managed medically  Troponin levels are being monitored, patient is on Lopressor and baby aspirin  Breast cancer  Patient had a locally advanced breast cancer at diagnosis; there was always great concern for progression/metastatic disease  Patient now has lesions in the liver as well as bilateral pleural effusions  The obvious concern at this time is metastatic disease (breast or other)  Mrs Linda Chery has always been extremely reluctant to treat the breast cancer  I was able to discuss the results with the patient briefly, more so with the patient's son  The patient and family are planning to have additional discussions but more than likely, Mrs Linda Chery will not undergo any extensive workup  Patient is DNR  Dementia  Patient's mental status today was actually pretty good  I believe that part of the patient's dementia issues were due to her depression  Diarrhea  This may be part of the infectious process  Workup is in process  End of life care    We will wait and see what the patient/family decide  The plan is to control the acute issues (diarrhea, infections, elevated troponins etc) but from an oncology standpoint, not working up the liver lesions is perfectly acceptable  Eventually Mrs Susie Herrera will likely benefit from palliative care, end of life, hospice evaluate  The above was discussed with the patient and son; all questions were answered  Will follow with you  Please do not hesitate to contact me if you have any questions or need additional information  Thank you for this consult     _________________________________________________________________________________    Chief Complaint   Patient presents with    Weakness - Generalized     C/O Gen weakness X1 week   Daughter reports patient could not get out of shower this morning so EMS was called   Diarrhea     C/O diarrhea on off X 1 week  No recent antibiotic treatment      Cold level 3, DNAR    History of Present Illness:  55-year-old female with a history of breast cancer admitted with progressive fatigue and diarrhea  Mrs Susie Herrera was found in bed in no apparent distress, son was at bedside  Presently no pain control issues  Patient's biggest concern was being able to go home  Oncology history:  Patient was previously diagnosed with right-sided breast cancer (large mass, pT4b pN1 M0 R0 G2 ER/NE positive, HER-2/johnson negative = stage III B) status post mastectomy/lymph node dissection  PET scan at that time did not demonstrate any evidence for recurrence, clinically there were no concerning signs either  Issues were discussed with the patient and family at that time and the plan was to treat with tamoxifen only (June 2016)  Just prior to the diagnosis, Mrs Susie Herrera had lost her ; had significant issues with grieving and depression  Patient also was battling progressive dementia    Since that time, it has been difficult to get blood work on the patient, Mrs Susie Herrera has also refused mammograms in the past   Patient also refused some of the oncology office visits  Clinically there was never evidence for recurrence  Review of Systems   Constitutional: Positive for appetite change and fatigue  HENT: Negative  Eyes: Negative  Respiratory: Negative  Cardiovascular: Negative  Gastrointestinal: Negative  Endocrine: Negative  Genitourinary: Negative  Musculoskeletal: Negative  Skin: Negative  Allergic/Immunologic: Negative  Neurological: Negative  Hematological: Negative  Psychiatric/Behavioral: Negative  All other systems reviewed and are negative  Patient Active Problem List   Diagnosis    S/P mastectomy    Essential hypertension    Malignant neoplasm of overlapping sites of right breast in female, estrogen receptor positive ([address] )    Liver mass    Severe sepsis (HCC)    Weakness generalized    Type 2 myocardial infarction ([address] )    Acute cystitis    Moderate dementia with behavioral disturbance ([address] )    Diarrhea    Transaminitis     Past Medical History:   Diagnosis Date    Arthritis     Dementia ([facility] [address] [address] )     Hypertension     Incontinent of urine     Varicose veins of legs      Past Surgical History:   Procedure Laterality Date    BREAST EXCISIONAL BIOPSY Right     CATARACT EXTRACTION W/ INTRAOCULAR LENS IMPLANT Right 01/05/2016    EYE SURGERY      lazy eye    MASTECTOMY      MODIFIED RADICAL MASTECTOMY W/ AXILLARY LYMPH NODE DISSECTION Right 6/15/2016    Procedure: MASTECTOMY MODIFIED RADICAL (WITHOUT SENTINEL NODE BIOPSY); Surgeon: Leslye Hanna MD;  Location: [address] [place] [address];  Service:     OOPHORECTOMY Right     1965     [facility] CATARACT EXTRACAP,INSERT LENS Left 2/23/2016    Procedure: EXTRACTION EXTRACAPSULAR CATARACT PHACO INTRAOCULAR LENS (IOL); Surgeon: Cassie Colon MD;  Location: [facility];  Service: Ophthalmology    TONSILLECTOMY  1935    US GUIDED BREAST BIOPSY RIGHT COMPLETE Right 5/3/2016     History reviewed  No pertinent family history  Social History     Socioeconomic History    Marital status:       Spouse name: Not on file    Number of children: Not on file    Years of education: Not on file    Highest education level: Not on file   Occupational History    Not on file   Social Needs    Financial resource strain: Not on file    Food insecurity:     Worry: Not on file     Inability: Not on file    Transportation needs:     Medical: Not on file     Non-medical: Not on file   Tobacco Use    Smoking status: Never Smoker    Smokeless tobacco: Never Used   Substance and Sexual Activity    Alcohol use: Not Currently     Comment: socially    Drug use: No    Sexual activity: Not on file   Lifestyle    Physical activity:     Days per week: Not on file     Minutes per session: Not on file    Stress: Not on file   Relationships    Social connections:     Talks on phone: Not on file     Gets together: Not on file     Attends Yarsani service: Not on file     Active member of club or organization: Not on file     Attends meetings of clubs or organizations: Not on file     Relationship status: Not on file    Intimate partner violence:     Fear of current or ex partner: Not on file     Emotionally abused: Not on file     Physically abused: Not on file     Forced sexual activity: Not on file   Other Topics Concern    Not on file   Social History Narrative    Not on file       Current Facility-Administered Medications:     acetaminophen (TYLENOL) tablet 650 mg, 650 mg, Oral, Q6H PRN, Juancarlos Sullivan MD, 650 mg at 10/10/19 1307    aspirin (ECOTRIN LOW STRENGTH) EC tablet 81 mg, 81 mg, Oral, Daily, Kyung Revankar, DO, 81 mg at 10/10/19 0919    brimonidine-timolol (COMBIGAN) 0 2-0 5 % ophthalmic solution 1 drop, 1 drop, Both Eyes, Daily, Kyung Revankar, DO    cefepime (MAXIPIME) IVPB (premix) 1,000 mg, 1,000 mg, Intravenous, Q24H, Juancarlos Sullivan MD, Last Rate: 100 mL/hr at 10/10/19 1109, 1,000 mg at 10/10/19 1109    donepezil (ARICEPT) tablet 5 mg, 5 mg, Oral, HS, Kyung Revankar, DO, 5 mg at 10/09/19 2142    escitalopram (LEXAPRO) tablet 5 mg, 5 mg, Oral, Daily, Jamie Rogers MD, 5 mg at 10/10/19 1238    heparin (porcine) subcutaneous injection 5,000 Units, 5,000 Units, Subcutaneous, Q8H [address], Kyung Revankar, DO, 5,000 Units at 10/10/19 1307    [START ON 10/11/2019] lisinopril (ZESTRIL) tablet 10 mg, 10 mg, Oral, QAM, Dallas Romo, CRNP    LORazepam (ATIVAN) tablet 0 5 mg, 0 5 mg, Oral, Q8H PRN, Kyung Revankar, DO    magnesium sulfate 2 g/50 mL IVPB (premix) 2 g, 2 g, Intravenous, Once, Kyung Revankar, DO, 2 g at 10/10/19 1238    memantine (NAMENDA) tablet 5 mg, 5 mg, Oral, Daily, Kyung Revankar, DO, 5 mg at 10/10/19 0920    metoprolol tartrate (LOPRESSOR) tablet 25 mg, 25 mg, Oral, Q12H [address], Dallas Romo, CRNP, 25 mg at 10/10/19 1018    multi-electrolyte (PLASMALYTE-A/ISOLYTE-S PH 7 4) IV solution, 75 mL/hr, Intravenous, Continuous, Kin Lowry MD, Last Rate: 75 mL/hr at 10/10/19 0426, 75 mL/hr at 10/10/19 0426    multivitamin-minerals (CENTRUM) tablet 1 tablet, 1 tablet, Oral, Daily, Kyung Revankar, DO, 1 tablet at 10/10/19 0920    ondansetron (ZOFRAN) injection 4 mg, 4 mg, Intravenous, Q6H PRN, Kyung Revankar, DO, 4 mg at 10/09/19 2150    pantoprazole (PROTONIX) EC tablet 40 mg, 40 mg, Oral, Early Morning, Kyung Revankar, DO, 40 mg at 10/10/19 0548    pneumococcal 13-valent conjugate vaccine (PREVNAR-13) IM injection 0 5 mL, 0 5 mL, Intramuscular, Prior to discharge, Kyung Walton DO    tamoxifen (NOLVADEX) tablet 20 mg, 20 mg, Oral, Daily, Kyung Walton DO, 20 mg at 10/10/19 0920    vancomycin (VANCOCIN) IVPB (premix) 1,000 mg, 15 mg/kg, Intravenous, Q24H, Kin Lowry MD, Last Rate: 200 mL/hr at 10/10/19 0712, 1,000 mg at 10/10/19 0712    No Known Allergies    Vitals:    10/10/19 1315   BP: 104/55   Pulse: 75   Resp: (!) 23   Temp: 97 9 °F (36 6 °C)   SpO2: 95% Physical Exam   Constitutional: She is oriented to person, place, and time  She appears well-developed and well-nourished  Elderly female, no signs of pain, no respiratory distress   HENT:   Head: Normocephalic and atraumatic  Right Ear: External ear normal    Left Ear: External ear normal    Mouth/Throat: Oropharynx is clear and moist    Eyes: Pupils are equal, round, and reactive to light  Conjunctivae and EOM are normal    Neck: Normal range of motion  Neck supple  Supple, no JVD   Cardiovascular: Normal rate, regular rhythm, normal heart sounds and intact distal pulses  Pulmonary/Chest: Effort normal and breath sounds normal    Fair to good air entry bilaterally, rales at bases   Abdominal: Soft  Bowel sounds are normal    Soft, nontender, +bowel sounds, no hepatosplenomegaly, no guarding   Musculoskeletal: Normal range of motion  Neurological: She is alert and oriented to person, place, and time  She has normal reflexes  Skin: Skin is warm  Slightly pale, warm, moist, no petechiae or ecchymoses   Psychiatric: She has a normal mood and affect  Her behavior is normal  Judgment and thought content normal    Extremities:  No lower extremity edema, no cords, pulses are 1+  Lymphatics:  No adenopathy in the neck, supraclavicular region or axilla bilaterally    Labs    Results for Delphine Fink (MRN [id number]) as of 10/10/2019 16:36   Ref   Range 10/10/2019 04:31   WBC Latest Ref Range: 4 31 - 10 16 Thousand/uL 23 37 (H)   Red Blood Cell Count Latest Ref Range: 3 81 - 5 12 Million/uL 3 42 (L)   Hemoglobin Latest Ref Range: 11 5 - 15 4 g/dL 10 4 (L)   HCT Latest Ref Range: 34 8 - 46 1 % 34 3 (L)   MCV Latest Ref Range: 82 - 98 fL 100 (H)   MCH Latest Ref Range: 26 8 - 34 3 pg 30 4   MCHC Latest Ref Range: 31 4 - 37 4 g/dL 30 3 (L)   RDW Latest Ref Range: 11 6 - 15 1 % 15 3 (H)   Platelet Count Latest Ref Range: 149 - 390 Thousands/uL 159   MPV Latest Ref Range: 8 9 - 12 7 fL 11 2   nRBC Latest Units: /100 WBCs 0   Neutrophils % Latest Ref Range: 43 - 75 % 88 (H)   Immat GRANS % Latest Ref Range: 0 - 2 % 1   Lymphocytes Relative Latest Ref Range: 14 - 44 % 5 (L)   Monocytes Relative Latest Ref Range: 4 - 12 % 6   Eosinophils Latest Ref Range: 0 - 6 % 0   Basophils Relative Latest Ref Range: 0 - 1 % 0     Results for Maggy Fernandez (MRN [id number]) as of 10/10/2019 16:36   Ref  Range 10/10/2019 04:31   Sodium Latest Ref Range: 136 - 145 mmol/L 143   Potassium Latest Ref Range: 3 5 - 5 3 mmol/L 4 3   Chloride Latest Ref Range: 100 - 108 mmol/L 115 (H)   CO2 Latest Ref Range: 21 - 32 mmol/L 17 (L)   Anion Gap Latest Ref Range: 4 - 13 mmol/L 11   BUN Latest Ref Range: 5 - 25 mg/dL 22   Creatinine Latest Ref Range: 0 60 - 1 30 mg/dL 1 07   Glucose, Random Latest Ref Range: 65 - 140 mg/dL 171 (H)   Calcium Latest Ref Range: 8 3 - 10 1 mg/dL 7 0 (L)   AST Latest Ref Range: 5 - 45 U/L 86 (H)   ALT Latest Ref Range: 12 - 78 U/L 114 (H)   Alkaline Phosphatase Latest Ref Range: 46 - 116 U/L 41 (L)   Total Protein Latest Ref Range: 6 4 - 8 2 g/dL 5 4 (L)   Albumin Latest Ref Range: 3 5 - 5 0 g/dL 2 7 (L)   TOTAL BILIRUBIN Latest Ref Range: 0 20 - 1 00 mg/dL 0 60   eGFR Latest Units: ml/min/1 73sq m 46   Magnesium Latest Ref Range: 1 6 - 2 6 mg/dL 1 7       Imaging    10/10/2019 CTA chest PE study    Large acute bilateral pulmonary emboli with new small bilateral pleural effusions and CT evidence of RIGHT HEART STRAIN  Small ascending thoracic aortic aneurysm measuring 4 1 cm  Hiatal hernia  Dependent atelectasis in the lung bases  10/09/2019 CT scan of the chest and abdomen pelvis    There are several indeterminate but potentially suspicious liver masses which might be metastatic disease  There also appear to be a couple liver cysts  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  There is a hiatal hernia  Right mastectomy  Indeterminate but probably cystic left renal lesion measures 2 9 cm  Mild colonic diverticulosis    PET/CT was performed on May 10, 2016  Impression stated that there was a large hypermetabolic right breast mass and right axillary metastatic adenopathy  There was no additional hypermetabolic metastases visualized  Patient had severe bilateral hydroureteronephrosis likely due to a prolapsed bladder  Patient had a ascending thoracic aorta measuring 4 x 4 2 cm  Pathology    6/15/16 right mastectomy demonstrated mucinous carcinoma, grade 2, 10 2 cm, involving the nipple with surface ulceration  Skeletal muscle was negative for carcinoma  Lymphovascular invasion was present  Dermal lymphovascular invasion was present  DCIS and LCIS were not identified  All margins were negative for carcinoma, the closest was the posterior margin at 1 8 mm  2/21 lymph nodes were positive for metastatic carcinoma  The prior biopsy ([id number]) demonstrated ER = 100%, OH = 70-75%, HER-2/johnson 1+      AJCC = pT4b Pn1 M0 R0 G2 ER/OH positive, HER-2/johnson negative = stage III B     5/3/16 right breast core biopsy demonstrated invasive carcinoma of no special type, no in situ carcinoma identified, lymphovascular invasion not identified, microcalcifications not identified

## 2019-10-10 NOTE — PHYSICAL THERAPY NOTE
PT EVALUATION     10/10/19 1105   Pain Assessment   Pain Assessment No/denies pain   Home Living   Type of [address] One level;Stairs to enter with rails  (stair glide to enter from garage)   [address] chair   Home Equipment Walker;Quad cane   Additional Comments patient using roller walker prior to admission but with increasing need for assist of family   Prior Function   Level of [place] Needs assistance with ADLs and functional mobility   Lives With Family;Daughter   Receives Help From Family   ADL Assistance Needs assistance   IADLs Needs assistance   Comments patient requiring increasing need for assist with mobility and ADLs as per family   Restrictions/Precautions   Other Precautions Cognitive; Chair Alarm; Bed Alarm; Fall Risk   General   Additional Pertinent History chart reviewed, patient admitted  with UTI, weakness and declining strength/functional mobility as per family   Family/Caregiver Present Yes   Cognition   Overall Cognitive Status Impaired   Arousal/Participation Cooperative   Attention Attends with cues to redirect   Orientation Level Oriented to person;Oriented to place   Following Commands Follows one step commands with increased time or repetition   RLE Assessment   RLE Assessment   (ROM WFL, strength 3+/5)   LLE Assessment   LLE Assessment   (ROM WFL, strength 3+/5)   Coordination   Movements are Fluid and Coordinated 0   Bed Mobility   Supine to Sit 3  Moderate assistance   Additional items Assist x 1;Verbal cues;LE management   Transfers   Sit to Stand 3  Moderate assistance   Additional items Assist x 1   Stand to Sit 3  Moderate assistance   Additional items Assist x 1   Ambulation/Elevation   Gait Assistance 3  Moderate assist   Additional items Assist x 1;Verbal cues; Tactile cues   Assistive Device Rolling walker   Distance 5 feet from bed to chair with verbal cuing, head down positioning, shuffling type gait    Patient impulsively returned to sitting and forward flexed sitting posture and patient unable to verbal reasoning for posture   Balance   Static Sitting Fair   Dynamic Sitting Fair -   Static Standing Poor +   Dynamic Standing Poor   Ambulatory Poor   Activity Tolerance   Activity Tolerance Patient limited by fatigue  (limited by cognition)   Nurse Made Aware yes   Assessment   Assessment Patient seen for Physical Therapy evaluation  Patient admitted with Severe sepsis ([facility] [address] )  Comorbidities affecting patient's physical performance include: dementia, R breast CA, liver mass, acute cystitis, diarrhea, htn  Personal factors affecting patient at time of initial evaluation include: ambulating with assistive device, stairs to enter home, inability to ambulate household distances, inability to navigate community distances, inability to navigate level surfaces without external assistance, inability to perform dynamic tasks in community, decreased cognition, inability to perform caregiver support/tasks, inability to perform physical activity, limited insight into impairments, inability to perform ADLS and inability to perform IADLS   Prior to admission, patient was requiring assist for functional mobility with walker, requiring assist for ADLS, requiring assist for IADLS, ambulating household distance and home with family assist   Please find objective findings from Physical Therapy assessment regarding body systems outlined above with impairments and limitations including weakness, decreased ROM, impaired balance, decreased endurance, impaired coordination, gait deviations, decreased activity tolerance, decreased functional mobility tolerance, decreased safety awareness, impaired judgement, fall risk and decreased cognition  The Barthel Index was used as a functional outcome tool presenting with a score of 35 today indicating marked limitations of functional mobility and ADLS    Patient's clinical presentation is currently unstable/unpredictable as seen in patient's presentation of vital sign response, changing level of pain, varying levels of cognitive performance, increased fall risk, new onset of impairment of functional mobility, decreased endurance and new onset of weakness  Pt would benefit from continued Physical Therapy treatment to address deficits as defined above and maximize level of functional mobility  As demonstrated by objective findings, the assigned level of complexity for this evaluation is high  Goals   Patient Goals none stated patient confused   STG Expiration Date 10/17/19   Short Term Goal #1 transfers and gait with roller walker with min assist of 1   Short Term Goal #2 gait endurance to 40 feet , strength BLEs 4-/5   LTG Expiration Date 10/24/19   Long Term Goal #1 transfers and gait with roller walker with supervision   Long Term Goal #2 gait endurance to functional household distances   Plan   Treatment/Interventions ADL retraining;Functional transfer training;LE strengthening/ROM; Elevations; Therapeutic exercise; Endurance training;Cognitive reorientation;Patient/family training;Equipment eval/education; Bed mobility;Gait training; Compensatory technique education   PT Frequency 5x/wk   Recommendation   Recommendation Short-term skilled PT   Barthel Index   Feeding 5   Bathing 0   Grooming Score 0   Dressing Score 5   Bladder Score 5   Bowels Score 10   Toilet Use Score 5   Transfers (Bed/Chair) Score 5   Mobility (Level Surface) Score 0   Stairs Score 0   Barthel Index Score 28   Licensure   [place] License Number  Tanvir Gross PT [id number]

## 2019-10-11 ENCOUNTER — APPOINTMENT (INPATIENT)
Dept: RADIOLOGY | Facility: HOSPITAL | Age: 84
DRG: 871 | End: 2019-10-11
Payer: MEDICARE

## 2019-10-11 PROBLEM — R57.9 SHOCK (HCC): Status: ACTIVE | Noted: 2019-10-11

## 2019-10-11 LAB
ALBUMIN SERPL BCP-MCNC: 2.7 G/DL (ref 3.5–5)
ALP SERPL-CCNC: 39 U/L (ref 46–116)
ALT SERPL W P-5'-P-CCNC: 95 U/L (ref 12–78)
ANION GAP SERPL CALCULATED.3IONS-SCNC: 11 MMOL/L (ref 4–13)
AST SERPL W P-5'-P-CCNC: 55 U/L (ref 5–45)
BACTERIA UR CULT: ABNORMAL
BILIRUB SERPL-MCNC: 0.3 MG/DL (ref 0.2–1)
BUN SERPL-MCNC: 23 MG/DL (ref 5–25)
CALCIUM SERPL-MCNC: 7.7 MG/DL (ref 8.3–10.1)
CHLORIDE SERPL-SCNC: 112 MMOL/L (ref 100–108)
CO2 SERPL-SCNC: 17 MMOL/L (ref 21–32)
CREAT SERPL-MCNC: 1.13 MG/DL (ref 0.6–1.3)
ERYTHROCYTE [DISTWIDTH] IN BLOOD BY AUTOMATED COUNT: 15.6 % (ref 11.6–15.1)
GFR SERPL CREATININE-BSD FRML MDRD: 43 ML/MIN/1.73SQ M
GLUCOSE SERPL-MCNC: 142 MG/DL (ref 65–140)
GLUCOSE SERPL-MCNC: 146 MG/DL (ref 65–140)
HCT VFR BLD AUTO: 30.7 % (ref 34.8–46.1)
HGB BLD-MCNC: 9.6 G/DL (ref 11.5–15.4)
MAGNESIUM SERPL-MCNC: 2.7 MG/DL (ref 1.6–2.6)
MCH RBC QN AUTO: 30.8 PG (ref 26.8–34.3)
MCHC RBC AUTO-ENTMCNC: 31.3 G/DL (ref 31.4–37.4)
MCV RBC AUTO: 98 FL (ref 82–98)
MRSA NOSE QL CULT: NORMAL
PHOSPHATE SERPL-MCNC: 2.8 MG/DL (ref 2.3–4.1)
PLATELET # BLD AUTO: 152 THOUSANDS/UL (ref 149–390)
PMV BLD AUTO: 10.7 FL (ref 8.9–12.7)
POTASSIUM SERPL-SCNC: 3.7 MMOL/L (ref 3.5–5.3)
PROCALCITONIN SERPL-MCNC: 0.48 NG/ML
PROT SERPL-MCNC: 5.7 G/DL (ref 6.4–8.2)
RBC # BLD AUTO: 3.12 MILLION/UL (ref 3.81–5.12)
SODIUM SERPL-SCNC: 140 MMOL/L (ref 136–145)
WBC # BLD AUTO: 16.19 THOUSAND/UL (ref 4.31–10.16)

## 2019-10-11 PROCEDURE — 82948 REAGENT STRIP/BLOOD GLUCOSE: CPT

## 2019-10-11 PROCEDURE — 94760 N-INVAS EAR/PLS OXIMETRY 1: CPT

## 2019-10-11 PROCEDURE — 76705 ECHO EXAM OF ABDOMEN: CPT

## 2019-10-11 PROCEDURE — 83735 ASSAY OF MAGNESIUM: CPT | Performed by: ANESTHESIOLOGY

## 2019-10-11 PROCEDURE — 84100 ASSAY OF PHOSPHORUS: CPT | Performed by: ANESTHESIOLOGY

## 2019-10-11 PROCEDURE — 99232 SBSQ HOSP IP/OBS MODERATE 35: CPT | Performed by: INTERNAL MEDICINE

## 2019-10-11 PROCEDURE — 80053 COMPREHEN METABOLIC PANEL: CPT | Performed by: PHYSICIAN ASSISTANT

## 2019-10-11 PROCEDURE — 93970 EXTREMITY STUDY: CPT

## 2019-10-11 PROCEDURE — 85027 COMPLETE CBC AUTOMATED: CPT | Performed by: PHYSICIAN ASSISTANT

## 2019-10-11 PROCEDURE — 99233 SBSQ HOSP IP/OBS HIGH 50: CPT | Performed by: ANESTHESIOLOGY

## 2019-10-11 PROCEDURE — 84145 PROCALCITONIN (PCT): CPT | Performed by: PHYSICIAN ASSISTANT

## 2019-10-11 PROCEDURE — 93970 EXTREMITY STUDY: CPT | Performed by: SURGERY

## 2019-10-11 RX ORDER — POTASSIUM CHLORIDE 14.9 MG/ML
20 INJECTION INTRAVENOUS ONCE
Status: COMPLETED | OUTPATIENT
Start: 2019-10-11 | End: 2019-10-11

## 2019-10-11 RX ADMIN — ENOXAPARIN SODIUM 60 MG: 60 INJECTION SUBCUTANEOUS at 09:58

## 2019-10-11 RX ADMIN — POTASSIUM CHLORIDE 20 MEQ: 14.9 INJECTION, SOLUTION INTRAVENOUS at 09:18

## 2019-10-11 RX ADMIN — ASPIRIN 81 MG: 81 TABLET, COATED ORAL at 09:59

## 2019-10-11 RX ADMIN — EPINEPHRINE 2 MCG/MIN: 1 INJECTION, SOLUTION, CONCENTRATE INTRAVENOUS at 10:27

## 2019-10-11 RX ADMIN — DONEPEZIL HYDROCHLORIDE 5 MG: 5 TABLET ORAL at 21:49

## 2019-10-11 RX ADMIN — TAMOXIFEN CITRATE 20 MG: 10 TABLET, FILM COATED ORAL at 09:59

## 2019-10-11 RX ADMIN — Medication 1 TABLET: at 09:58

## 2019-10-11 RX ADMIN — EPINEPHRINE 2 MCG/MIN: 1 INJECTION, SOLUTION, CONCENTRATE INTRAVENOUS at 02:15

## 2019-10-11 RX ADMIN — CEFTRIAXONE 1000 MG: 1 INJECTION, SOLUTION INTRAVENOUS at 05:40

## 2019-10-11 RX ADMIN — ESCITALOPRAM 5 MG: 5 TABLET, FILM COATED ORAL at 09:58

## 2019-10-11 RX ADMIN — MEMANTINE 5 MG: 10 TABLET ORAL at 09:58

## 2019-10-11 NOTE — CONSULTS
Consult- Mario Alberto Pillai 12/17/1930, 80 y o  female MRN: [id number]    Unit/Bed#: ICU 11 Encounter: [id number]    Primary Care Provider: Ida Griffith MD   Date and time admitted to hospital: 10/9/2019  9:24 AM      Consults    Pulmonary embolism ([address] )  Assessment & Plan  - 10/10 Echo: EF 60%, grd 1 DD, TAPSE 1 3, Ventricular Septum w/ mod  Sys  Flattening    - 10/10 CT PE Study: Large acute bilateral pulmonary emboli with new small bilateral pleural effusions and CT evidence of RIGHT HEART STRAIN  Small ascending thoracic aortic aneurysm measuring 4 1 cm  Hiatal hernia  Dependent atelectasis in the lung bases  - Patient started on Therapeutic lovenox  1mg/kg daily   - D/t continued hypotension throughout shift goals of care discussion held with patients children  Ultimately it was decided that the patient be transferred to ICU as Critical care for central line insertion and initiation of vasopressor/inotropic medications to sustain MAP >65  Family is aware of patients tenuous status  * Severe sepsis (HCC)  Assessment & Plan  - Met Severe sepsis criteria on admission and started on empiric abx targeting acute cystitis as source, procal negative this AM, Urine culture (+) for E  Coli, narrow to rocephin   - Liver with cystic lesions on CT scan, F/u with RUQ u/s  - Lactic cleared  - Follow Blood and Urine cultures for sensitivities  - Mental status currently at baseline, pt participating in exam  - D/t to episodes of hypotension and delayed lactic clearance patient was transferred to the ICU for Vasopressor/Inotrope support      Transaminitis  Assessment & Plan  - Likely d/t periods of hypotension, trend LFTs and f/u with RUQ U/S    Type 2 myocardial infarction [facility])  Assessment & Plan  - Troponin peaked at  54, now trending down, aspirin ordered, Cardiology following   - Echo results noted above     Liver mass  Assessment & Plan  - New Transaminitis, f/u RUQ U/S of liver mass, trend LFTs daily    -------------------------------------------------------------------------------------------------------------  Chief Complaint: SOB, Hotn    History of Present Illness     Johanne Lafleur is a 80 y o  female with pmh of Mod  dementia, Rt Breast CA s/p mastectomy, and HTN  Initially presented to ED with complaints of generalized weakness and diarrhea  The patient met severe sepsis criteria with a lactic of 4 1, elevated Cr , Leukocytosis, tachycardia, and a nitrite (+) UA consistent with a UTI  Pt was bolus the 30ml/kg and met all sepsis parameters, over the next 24 hours, lactic began to trend down but remained in the high 2s, SBP low after receiving evening dose of metoprolol remaining around 100,  Additional boluses were given without significant change  This AM labs returned with increasing Leukocytosis and new transaminitis  Critical care consulted for recommendations for BP and Lactic clearance  Echo performed 10/10 which showed Rt heart strain concerning for PE  CT PE study confirmed large B/L PE, immediately updated family and started on therapeutic Lovenox  Patients Lactic continued increasing and started to become hypotensive earlier today  Family discussion initiated and ultimately decided to transfer to the ICU for closer VS monitoring and start vasopressor/inotropic medications if necessary  History obtained from child, chart review and the patient   -------------------------------------------------------------------------------------------------------------  Dispo: Transfer to Critical care    Code Status: Level 3 - DNAR and DNI  --------------------------------------------------------------------------------------------------------------  Review of Systems   Unable to perform ROS: Dementia       A 12-point, complete review of systems was reviewed and negative except as stated above     Physical Exam   Constitutional: She appears well-developed  No distress     HENT:   Head: Normocephalic and atraumatic  Eyes: Pupils are equal, round, and reactive to light  Conjunctivae are normal    Neck: Normal range of motion  Neck supple  Cardiovascular: Normal rate, regular rhythm, normal heart sounds and intact distal pulses  No murmur heard  Pulmonary/Chest: Effort normal and breath sounds normal  No respiratory distress  She has no wheezes  Abdominal: Soft  Bowel sounds are normal  She exhibits no distension  There is no guarding  Musculoskeletal: Normal range of motion  Neurological: She is alert  Oriented to person and place, confused on time   Skin: Skin is warm  Capillary refill takes less than 2 seconds  She is not diaphoretic  Psychiatric: She has a normal mood and affect  Her behavior is normal  Thought content normal      --------------------------------------------------------------------------------------------------------------  Historical Information   Past Medical History:   Diagnosis Date    Arthritis     Dementia ([address] )     Hypertension     Incontinent of urine     Varicose veins of legs      Past Surgical History:   Procedure Laterality Date    BREAST EXCISIONAL BIOPSY Right     CATARACT EXTRACTION W/ INTRAOCULAR LENS IMPLANT Right 01/05/2016    EYE SURGERY      lazy eye    MASTECTOMY      MODIFIED RADICAL MASTECTOMY W/ AXILLARY LYMPH NODE DISSECTION Right 6/15/2016    Procedure: MASTECTOMY MODIFIED RADICAL (WITHOUT SENTINEL NODE BIOPSY); Surgeon: Abe Ashraf MD;  Location: [address] [place] [address];  Service:     OOPHORECTOMY Right     1965     [facility] CATARACT EXTRACAP,INSERT LENS Left 2/23/2016    Procedure: EXTRACTION EXTRACAPSULAR CATARACT PHACO INTRAOCULAR LENS (IOL);   Surgeon: Chayito Elaine MD;  Location: [facility];  Service: Ophthalmology    TONSILLECTOMY  [address] [place] [address] E BREAST BIOPSY RIGHT COMPLETE Right 5/3/2016     Social History   Social History     Substance and Sexual Activity   Alcohol Use Not Currently    Comment: socially     Social History     Substance and Sexual Activity   Drug Use No     Social History     Tobacco Use   Smoking Status Never Smoker   Smokeless Tobacco Never Used     Exercise History: Ambulates with assistance  Family History: I have reviewed this patient's family history and commented on sigificant items within the HPI    Vitals:   Vitals:    10/10/19 2008 10/10/19 2100 10/10/19 2200 10/10/19 2259   BP: 124/74 115/69 110/63    BP Location:       Pulse: 87 87 88    Resp: (!) 24 (!) 23 (!) 27    Temp:    97 9 °F (36 6 °C)   TempSrc:    Temporal   SpO2: 96% 95% 94%    Weight:       Height:         Temp  Min: 96 2 °F (35 7 °C)  Max: 99 5 °F (37 5 °C)  IBW: 50 1 kg  Height: 5' 2" (157 5 cm)  Body mass index is 23 76 kg/m²  Medications:  Current Facility-Administered Medications   Medication Dose Route Frequency    acetaminophen (TYLENOL) tablet 650 mg  650 mg Oral Q6H PRN    aspirin (ECOTRIN LOW STRENGTH) EC tablet 81 mg  81 mg Oral Daily    brimonidine-timolol (COMBIGAN) 0 2-0 5 % ophthalmic solution 1 drop  1 drop Both Eyes Daily    [START ON 10/11/2019] cefTRIAXone (ROCEPHIN) IVPB (premix) 1,000 mg  1,000 mg Intravenous Q24H    donepezil (ARICEPT) tablet 5 mg  5 mg Oral HS    EPINEPHrine 3000 mcg (STANDARD CONCENTRATION) IV in sodium chloride 0 9% 250 mL  1-10 mcg/min Intravenous Titrated    escitalopram (LEXAPRO) tablet 5 mg  5 mg Oral Daily    memantine (NAMENDA) tablet 5 mg  5 mg Oral Daily    multi-electrolyte (PLASMALYTE-A/ISOLYTE-S PH 7 4) IV solution  50 mL/hr Intravenous Continuous    multivitamin-minerals (CENTRUM) tablet 1 tablet  1 tablet Oral Daily    ondansetron (ZOFRAN) injection 4 mg  4 mg Intravenous Q6H PRN    pneumococcal 13-valent conjugate vaccine (PREVNAR-13) IM injection 0 5 mL  0 5 mL Intramuscular Prior to discharge    tamoxifen (NOLVADEX) tablet 20 mg  20 mg Oral Daily     Home medications:  Prior to Admission Medications   Prescriptions Last Dose Informant Patient Reported? Taking? COMBIGAN 0 2-0 5 % 10/8/2019 at Unknown time Child Yes Yes   LORazepam (ATIVAN) 0 5 mg tablet Not Taking at Unknown time Child No No   Sig: Take 1 tablet (0 5 mg total) by mouth every 8 (eight) hours as needed for anxiety   Patient not taking: Reported on 10/9/2019   Memantine HCl ER (NAMENDA XR) 7 MG CP24 Not Taking at Unknown time Child Yes No   Sig: Take 1 tablet by mouth every morning  Multiple Vitamin (MULTIVITAMIN) tablet 10/8/2019 at Unknown time  Yes Yes   Sig: Take 1 tablet by mouth daily   aspirin 81 MG tablet 10/8/2019 at Unknown time Child Yes Yes   Sig: Take 81 mg by mouth every morning  donepezil (ARICEPT) 5 mg tablet 10/8/2019 at Unknown time Child No Yes   Sig: Take 1 tablet (5 mg total) by mouth daily at bedtime   fluocinolone (SYNALAR) 0 025 % cream Not Taking at Unknown time Child Yes No   lisinopril (ZESTRIL) 20 mg tablet Not Taking at Unknown time Child Yes No   Sig: Take 20 mg by mouth every morning       memantine (NAMENDA) 10 mg tablet 10/8/2019 at Unknown time Child Yes Yes   tamoxifen (NOLVADEX) 20 mg tablet 10/8/2019 at Unknown time Child No Yes   Sig: Take 1 tablet (20 mg total) by mouth daily      Facility-Administered Medications: None     Allergies:  No Known Allergies      Laboratory and Diagnostics:  Results from last 7 days   Lab Units 10/10/19  0431 10/09/19  0951   WBC Thousand/uL 23 37* 17 27*   HEMOGLOBIN g/dL 10 4* 11 7   HEMATOCRIT % 34 3* 39 0   PLATELETS Thousands/uL 159 182   NEUTROS PCT % 88* 77*   MONOS PCT % 6 4     Results from last 7 days   Lab Units 10/10/19  0431 10/09/19  0951   SODIUM mmol/L 143 141   POTASSIUM mmol/L 4 3 3 8   CHLORIDE mmol/L 115* 108   CO2 mmol/L 17* 20*   ANION GAP mmol/L 11 13   BUN mg/dL 22 25   CREATININE mg/dL 1 07 1 31*   CALCIUM mg/dL 7 0* 8 8   GLUCOSE RANDOM mg/dL 171* 189*   ALT U/L 114* 36   AST U/L 86* 33   ALK PHOS U/L 41* 49   ALBUMIN g/dL 2 7* 3 6   TOTAL BILIRUBIN mg/dL 0 60 0 40     Results from last 7 days   Lab Units 10/10/19  0431   MAGNESIUM mg/dL 1 7      Results from last 7 days   Lab Units 10/09/19  0951   INR  1 04   PTT seconds 22*      Results from last 7 days   Lab Units 10/09/19  2010 10/09/19  1658 10/09/19  1356 10/09/19  0951   TROPONIN I ng/mL 0 45* 0 54* 0 47* 0 45*     Results from last 7 days   Lab Units 10/10/19  2121 10/10/19  1245 10/10/19  0827 10/10/19  4880 10/10/19  0431 10/10/19  0156 10/09/19  2309   LACTIC ACID mmol/L 1 8 4 7* 2 1* 2 4* 2 8* 2 7* 2 9*     ABG:    VBG:  Results from last 7 days   Lab Units 10/10/19  2110   PH NATALY  7 270*   PCO2 NATALY mm Hg 33 5*   PO2 NATALY mm Hg 36 0   HCO3 NATALY mmol/L 15 0*   BASE EXC NATALY mmol/L -10 8     Results from last 7 days   Lab Units 10/10/19  0642   PROCALCITONIN ng/ml 0 23         Micro:  Results from last 7 days   Lab Units 10/09/19  1053 10/09/19  0951 10/09/19  0946   BLOOD CULTURE   --  No Growth at 24 hrs  No Growth at 24 hrs  URINE CULTURE  >100,000 cfu/ml Escherichia coli*  --   --          EKG: NSR  Imaging: I have personally reviewed pertinent reports  and I have personally reviewed pertinent films in PACS    ------------------------------------------------------------------------------------------------------------  Advance Directive and Living Will: Yes    Power of : Yes  POLST:    ------------------------------------------------------------------------------------------------------------  Counseling / Coordination of Care  Total Critical Care time spent 45 minutes excluding procedures, teaching and family updates  Jeanine Null PA-C        Portions of the record may have been created with voice recognition software  Occasional wrong word or "sound a like" substitutions may have occurred due to the inherent limitations of voice recognition software    Read the chart carefully and recognize, using context, where substitutions have occurred

## 2019-10-11 NOTE — ASSESSMENT & PLAN NOTE
- 10/10 Echo: EF 60%, grd 1 DD, TAPSE 1 3, Ventricular Septum w/ mod  Sys  Flattening    - 10/10 CT PE Study: Large acute bilateral pulmonary emboli with new small bilateral pleural effusions and CT evidence of RIGHT HEART STRAIN  Small ascending thoracic aortic aneurysm measuring 4 1 cm  Hiatal hernia  Dependent atelectasis in the lung bases  - Patient started on Therapeutic lovenox  1mg/kg daily   - Started on Epinephrine for Inotropic/pressor support d/t clot burden and Rt heart strain, titrate as needed   -Epinephrine off since 0441 am  - Family is aware of patients tenuous status

## 2019-10-11 NOTE — ASSESSMENT & PLAN NOTE
- Troponin peaked at  54, now trending down, aspirin ordered, Cardiology following   - Echo results noted above

## 2019-10-11 NOTE — ASSESSMENT & PLAN NOTE
- Met Severe sepsis criteria on admission and started on empiric abx targeting acute cystitis as source, procal negative this AM, Urine culture (+) for E  Coli, continue rocephin   - Liver with cystic lesions on CT scan, F/u with RUQ u/s  - Lactic cleared  - Follow Blood and Urine cultures for sensitivities  - Mental status currently at baseline, pt participating in exam  - D/t to episodes of hypotension and delayed lactic clearance patient was transferred to the ICU for Vasopressor/Inotrope support

## 2019-10-11 NOTE — PROCEDURES
Central Line Insertion  Date/Time: 10/10/2019 9:44 PM  Performed by: Mamadou Robledo PA-C  Authorized by: Mamadou Robledo PA-C     Patient location:  Bedside and ICU  Consent:     Consent obtained:  Written    Consent given by:  Guardian    Risks discussed:  Arterial puncture, incorrect placement, nerve damage, pneumothorax, infection and bleeding    Alternatives discussed:  No treatment  Universal protocol:     Procedure explained and questions answered to patient or proxy's satisfaction: yes      Relevant documents present and verified: yes      Test results available and properly labeled: yes      Radiology Images displayed and confirmed  If images not available, report reviewed: yes      Required blood products, implants, devices, and special equipment available: yes      Site/side marked: yes      Immediately prior to procedure, a time out was called: yes      Patient identity confirmed:  Verbally with patient, hospital-assigned identification number and arm band  Pre-procedure details:     Hand hygiene: Hand hygiene performed prior to insertion      Sterile barrier technique: All elements of maximal sterile technique followed      Skin preparation:  2% chlorhexidine    Skin preparation agent: Skin preparation agent completely dried prior to procedure    Indications:     Central line indications: medications requiring central line and hemodynamic monitoring    Anesthesia (see MAR for exact dosages):      Anesthesia method:  Local infiltration    Local anesthetic:  Lidocaine 1% w/o epi  Procedure details:     Location:  Right internal jugular    Vessel type: vein      Laterality:  Right    Approach: percutaneous technique used      Patient position:  Trendelenburg    Catheter type:  Triple lumen 16cm    Catheter size:  7 5 Fr    Landmarks identified: yes      Ultrasound guidance: yes      Sterile ultrasound techniques: Sterile gel and sterile probe covers were used      Manometry confirmation: yes (Ro drop and Paralell probe view)      Number of attempts:  1    Successful placement: yes      Vessel of catheter tip end:  SVC  Post-procedure details:     Post-procedure:  Dressing applied and line sutured    Assessment:  Blood return through all ports, no pneumothorax on x-ray, placement verified by x-ray and free fluid flow    Post-procedure complications: none      Patient tolerance of procedure:   Tolerated well, no immediate complications

## 2019-10-11 NOTE — PLAN OF CARE
Problem: Potential for Falls  Goal: Patient will remain free of falls  Description  INTERVENTIONS:  - Assess patient frequently for physical needs  -  Identify cognitive and physical deficits and behaviors that affect risk of falls  -  [place] fall precautions as indicated by assessment   - Educate patient/family on patient safety including physical limitations  - Instruct patient to call for assistance with activity based on assessment  - Modify environment to reduce risk of injury  - Consider OT/PT consult to assist with strengthening/mobility  Outcome: Progressing     Problem: Nutrition/Hydration-ADULT  Goal: Nutrient/Hydration intake appropriate for improving, restoring or maintaining nutritional needs  Description  Monitor and assess patient's nutrition/hydration status for malnutrition  Collaborate with interdisciplinary team and initiate plan and interventions as ordered  Monitor patient's weight and dietary intake as ordered or per policy  Utilize nutrition screening tool and intervene as necessary  Determine patient's food preferences and provide high-protein, high-caloric foods as appropriate       INTERVENTIONS:  - Monitor oral intake, urinary output, labs, and treatment plans  - Assess nutrition and hydration status and recommend course of action  - Evaluate amount of meals eaten  - Assist patient with eating if necessary   - Allow adequate time for meals  - Recommend/ encourage appropriate diets, oral nutritional supplements, and vitamin/mineral supplements  - Order, calculate, and assess calorie counts as needed  - Assess need for intravenous fluids  - Provide specific nutrition/hydration education as appropriate  - Include patient/family/caregiver in decisions related to nutrition   Outcome: Progressing     Problem: Prexisting or High Potential for Compromised Skin Integrity  Goal: Skin integrity is maintained or improved  Description  INTERVENTIONS:  - Identify patients at risk for skin breakdown  - Assess and monitor skin integrity  - Assess and monitor nutrition and hydration status  - Monitor labs   - Assess for incontinence   - Turn and reposition patient  - Assist with mobility/ambulation  - Relieve pressure over bony prominences  - Avoid friction and shearing  - Provide appropriate hygiene as needed including keeping skin clean and dry  - Evaluate need for skin moisturizer/barrier cream  - Collaborate with interdisciplinary team   - Patient/family teaching  - Consider wound care consult   Outcome: Progressing     Problem: PAIN - ADULT  Goal: Verbalizes/displays adequate comfort level or baseline comfort level  Description  Interventions:  - Encourage patient to monitor pain and request assistance  - Assess pain using appropriate pain scale  - Administer analgesics based on type and severity of pain and evaluate response  - Implement non-pharmacological measures as appropriate and evaluate response  - Consider cultural and social influences on pain and pain management  - Notify physician/advanced practitioner if interventions unsuccessful or patient reports new pain  Outcome: Progressing     Problem: INFECTION - ADULT  Goal: Absence or prevention of progression during hospitalization  Description  INTERVENTIONS:  - Assess and monitor for signs and symptoms of infection  - Monitor lab/diagnostic results  - Monitor all insertion sites, i e  indwelling lines, tubes, and drains  - Monitor endotracheal if appropriate and nasal secretions for changes in amount and color  - [place] appropriate cooling/warming therapies per order  - Administer medications as ordered  - Instruct and encourage patient and family to use good hand hygiene technique  - Identify and instruct in appropriate isolation precautions for identified infection/condition  Outcome: Progressing  Goal: Absence of fever/infection during neutropenic period  Description  INTERVENTIONS:  - Monitor WBC    Outcome: Progressing     Problem: SAFETY ADULT  Goal: Maintain or return to baseline ADL function  Description  INTERVENTIONS:  -  Assess patient's ability to carry out ADLs; assess patient's baseline for ADL function and identify physical deficits which impact ability to perform ADLs (bathing, care of mouth/teeth, toileting, grooming, dressing, etc )  - Assess/evaluate cause of self-care deficits   - Assess range of motion  - Assess patient's mobility; develop plan if impaired  - Assess patient's need for assistive devices and provide as appropriate  - Encourage maximum independence but intervene and supervise when necessary  - Involve family in performance of ADLs  - Assess for home care needs following discharge   - Consider OT consult to assist with ADL evaluation and planning for discharge  - Provide patient education as appropriate  Outcome: Progressing  Goal: Maintain or return mobility status to optimal level  Description  INTERVENTIONS:  - Assess patient's baseline mobility status (ambulation, transfers, stairs, etc )    - Identify cognitive and physical deficits and behaviors that affect mobility  - Identify mobility aids required to assist with transfers and/or ambulation (gait belt, sit-to-stand, lift, walker, cane, etc )  - [place] fall precautions as indicated by assessment  - Record patient progress and toleration of activity level on Mobility SBAR; progress patient to next Phase/Stage  - Instruct patient to call for assistance with activity based on assessment  - Consider rehabilitation consult to assist with strengthening/weightbearing, etc   Outcome: Progressing     Problem: DISCHARGE PLANNING  Goal: Discharge to home or other facility with appropriate resources  Description  INTERVENTIONS:  - Identify barriers to discharge w/patient and caregiver  - Arrange for needed discharge resources and transportation as appropriate  - Identify discharge learning needs (meds, wound care, etc )  - Arrange for interpretive services to assist at discharge as needed  - Refer to Case Management Department for coordinating discharge planning if the patient needs post-hospital services based on physician/advanced practitioner order or complex needs related to functional status, cognitive ability, or social support system  Outcome: Progressing     Problem: Knowledge Deficit  Goal: Patient/family/caregiver demonstrates understanding of disease process, treatment plan, medications, and discharge instructions  Description  Complete learning assessment and assess knowledge base    Interventions:  - Provide teaching at level of understanding  - Provide teaching via preferred learning methods  Outcome: Progressing     Problem: CARDIOVASCULAR - ADULT  Goal: Maintains optimal cardiac output and hemodynamic stability  Description  INTERVENTIONS:  - Monitor I/O, vital signs and rhythm  - Monitor for S/S and trends of decreased cardiac output  - Administer and titrate ordered vasoactive medications to optimize hemodynamic stability  - Assess quality of pulses, skin color and temperature  - Assess for signs of decreased coronary artery perfusion  - Instruct patient to report change in severity of symptoms  Outcome: Progressing  Goal: Absence of cardiac dysrhythmias or at baseline rhythm  Description  INTERVENTIONS:  - Continuous cardiac monitoring, vital signs, obtain 12 lead EKG if ordered  - Administer antiarrhythmic and heart rate control medications as ordered  - Monitor electrolytes and administer replacement therapy as ordered  Outcome: Progressing     Problem: GENITOURINARY - ADULT  Goal: Maintains or returns to baseline urinary function  Description  INTERVENTIONS:  - Assess urinary function  - Encourage oral fluids to ensure adequate hydration if ordered  - Administer IV fluids as ordered to ensure adequate hydration  - Administer ordered medications as needed  - Offer frequent toileting  - Follow urinary retention protocol if ordered  Outcome: Progressing  Goal: Absence of urinary retention  Description  INTERVENTIONS:  - Assess patients ability to void and empty bladder  - Monitor I/O  - Bladder scan as needed  - Discuss with physician/AP medications to alleviate retention as needed  - Discuss catheterization for long term situations as appropriate  Outcome: Progressing  Goal: Urinary catheter remains patent  Description  INTERVENTIONS:  - Assess patency of urinary catheter  - If patient has a chronic diaz, consider changing catheter if non-functioning  - Follow guidelines for intermittent irrigation of non-functioning urinary catheter  Outcome: Progressing

## 2019-10-11 NOTE — PROGRESS NOTES
Progress Note Jluis Roemro 12/17/1930, 80 y o  female MRN: [id number]    Unit/Bed#: ICU 11 Encounter: [id number]    Primary Care Provider: Trina Arteaga MD   Date and time admitted to hospital: 10/9/2019  9:24 AM        Pulmonary embolism ([address] )  Assessment & Plan  - 10/10 Echo: EF 60%, grd 1 DD, TAPSE 1 3, Ventricular Septum w/ mod  Sys  Flattening    - 10/10 CT PE Study: Large acute bilateral pulmonary emboli with new small bilateral pleural effusions and CT evidence of RIGHT HEART STRAIN  Small ascending thoracic aortic aneurysm measuring 4 1 cm  Hiatal hernia  Dependent atelectasis in the lung bases  - Patient started on Therapeutic lovenox  1mg/kg daily   - Started on Epinephrine for Inotropic/pressor support d/t clot burden and Rt heart strain, titrate as needed  - Family is aware of patients tenuous status  * Severe sepsis (HCC)  Assessment & Plan  - Met Severe sepsis criteria on admission and started on empiric abx targeting acute cystitis as source, procal negative this AM, Urine culture (+) for E  Coli, continue rocephin   - Liver with cystic lesions on CT scan, F/u with RUQ u/s  - Lactic cleared  - Follow Blood and Urine cultures for sensitivities  - Mental status currently at baseline, pt participating in exam  - D/t to episodes of hypotension and delayed lactic clearance patient was transferred to the ICU for Vasopressor/Inotrope support  [facility])  Assessment & Plan  - Likely multi-factorial d/t presumed sepsis and now known clot burden causing right heart strain  - Epinephrine initiated for inotropic/vasoctive support   Titrate to MAP >65  - scvo2 65    Acute cystitis  Assessment & Plan  - Continue Rocephin, plan 3 days total abx course    Type 2 myocardial infarction [facility])  Assessment & Plan  - Troponin peaked at  54, now trending down, aspirin ordered, Cardiology following   - Echo results noted above     Liver mass  Assessment & Plan  - New Transaminitis, f/u RUQ U/S of liver mass, trend LFTs daily    ----------------------------------------------------------------------------------------  HPI/24hr events: Overnight Blood pressure dipped down to low 05P Systolic, Epi started at 2 with improvement  Disposition: Continue Critical Care   Code Status: Level 3 - DNAR and DNI  ---------------------------------------------------------------------------------------  SUBJECTIVE  Patient pleasant    Review of Systems   Constitutional: Negative  HENT: Negative  Eyes: Negative  Respiratory: Negative  Cardiovascular: Negative  Gastrointestinal: Negative  Genitourinary: Negative  Musculoskeletal: Negative  Neurological: Negative  Hematological: Negative  Psychiatric/Behavioral: Negative  All other systems reviewed and are negative  Review of systems was reviewed and negative unless stated above in HPI/24-hour events   ---------------------------------------------------------------------------------------  OBJECTIVE    Physical Exam   Constitutional: She appears well-developed  No distress  HENT:   Head: Normocephalic and atraumatic  Eyes: Pupils are equal, round, and reactive to light  Conjunctivae are normal    Neck: Normal range of motion  Neck supple  Cardiovascular: Normal rate, regular rhythm, normal heart sounds and intact distal pulses  No murmur heard  Pulmonary/Chest: Effort normal and breath sounds normal  No respiratory distress  She has no wheezes  Abdominal: Soft  Bowel sounds are normal  She exhibits no distension  There is no guarding  Musculoskeletal: Normal range of motion  She exhibits no edema  Neurological: She is alert  Oriented to person and place, confused about time   Skin: Skin is warm  Capillary refill takes less than 2 seconds  She is not diaphoretic  Psychiatric: She has a normal mood and affect   Her behavior is normal        Vitals   Vitals:    10/11/19 0222 10/11/19 0300 10/11/19 0400 10/11/19 0500   BP: 112/66 108/57 111/64 118/63   Pulse: 80 81 82 81   Resp:  17 17 16   Temp:       TempSrc:       SpO2: 94% 93% 95% 94%   Weight:       Height:         Temp (24hrs), Av 5 °F (36 9 °C), Min:97 9 °F (36 6 °C), Max:99 4 °F (37 4 °C)  Current: Temperature: 97 9 °F (36 6 °C)      Invasive/non-invasive ventilation settings   Respiratory    Lab Data (Last 4 hours)    None         O2/Vent Data (Last 4 hours)    None                Height and Weights   Height: 5' 2" (157 5 cm)  IBW: 50 1 kg  Body mass index is 26 09 kg/m²  Weight (last 2 days)     Date/Time   Weight    10/10/19 2020   64 7 (142 64)    10/09/19 1307   58 9 (129 9)    10/09/19 0932   58 1 (128 09)                Intake and Output  I/O       10/09 0701 - 10/10 0700 10/10 0701 - 10/11 0700    P  O  240     I V  (mL/kg) 941 3 (16) 837 5 (12 9)    IV Piggyback 3050     Total Intake(mL/kg) 4231 3 (71 8) 837 5 (12 9)    Urine (mL/kg/hr) 600 200 (0 1)    Emesis/NG output 0     Total Output 600 200    Net +3631 3 +637 5          Unmeasured Urine Occurrence 3 x 2 x    Unmeasured Stool Occurrence  2 x    Unmeasured Emesis Occurrence 1 x           Nutrition       Diet Orders   (From admission, onward)             Start     Ordered    10/10/19 2020  Diet Regular; Regular House  Diet effective now     Question Answer Comment   Diet Type Regular    Regular Regular House    RD to adjust diet per protocol?  Yes        10/10/19 2019    10/10/19 1517  Dietary nutrition supplements  Once     Question Answer Comment   Select Supplement: Ensure Compact-Vanilla    Frequency Lunch        10/10/19 1517    10/10/19 1517  Dietary nutrition supplements  Once     Question Answer Comment   Select Supplement: Ensure Compact-Chocolate    Frequency Dinner        10/10/19 1517    10/09/19 143  Room Service  Once     Question:  Type of Service  Answer:  Room Service - Appropriate with Assistance    10/09/19 143                Laboratory and Diagnostics:  Results from last 7 days   Lab Units 10/10/19  0431 10/09/19  0951   WBC Thousand/uL 23 37* 17 27*   HEMOGLOBIN g/dL 10 4* 11 7   HEMATOCRIT % 34 3* 39 0   PLATELETS Thousands/uL 159 182   NEUTROS PCT % 88* 77*   MONOS PCT % 6 4     Results from last 7 days   Lab Units 10/10/19  0431 10/09/19  0951   SODIUM mmol/L 143 141   POTASSIUM mmol/L 4 3 3 8   CHLORIDE mmol/L 115* 108   CO2 mmol/L 17* 20*   ANION GAP mmol/L 11 13   BUN mg/dL 22 25   CREATININE mg/dL 1 07 1 31*   CALCIUM mg/dL 7 0* 8 8   GLUCOSE RANDOM mg/dL 171* 189*   ALT U/L 114* 36   AST U/L 86* 33   ALK PHOS U/L 41* 49   ALBUMIN g/dL 2 7* 3 6   TOTAL BILIRUBIN mg/dL 0 60 0 40     Results from last 7 days   Lab Units 10/10/19  0431   MAGNESIUM mg/dL 1 7      Results from last 7 days   Lab Units 10/09/19  0951   INR  1 04   PTT seconds 22*      Results from last 7 days   Lab Units 10/09/19  2010 10/09/19  1658 10/09/19  1356 10/09/19  0951   TROPONIN I ng/mL 0 45* 0 54* 0 47* 0 45*     Results from last 7 days   Lab Units 10/10/19  2121 10/10/19  1245 10/10/19  0827 10/10/19  7544 10/10/19  0431 10/10/19  0156 10/09/19  2309   LACTIC ACID mmol/L 1 8 4 7* 2 1* 2 4* 2 8* 2 7* 2 9*     ABG:    VBG:  Results from last 7 days   Lab Units 10/10/19  2110   PH NATALY  7 270*   PCO2 NATALY mm Hg 33 5*   PO2 NATALY mm Hg 36 0   HCO3 NATALY mmol/L 15 0*   BASE EXC NATALY mmol/L -10 8     Results from last 7 days   Lab Units 10/10/19  0642   PROCALCITONIN ng/ml 0 23       Micro  Results from last 7 days   Lab Units 10/09/19  1053 10/09/19  0951 10/09/19  0946   BLOOD CULTURE   --  No Growth at 24 hrs  No Growth at 24 hrs  URINE CULTURE  >100,000 cfu/ml Escherichia coli*  --   --        EKG: NSR  Imaging: I have personally reviewed pertinent reports     and I have personally reviewed pertinent films in PACS    Active Medications  Scheduled Meds:  Current Facility-Administered Medications:  acetaminophen 650 mg Oral Q6H PRN Ramona Hendrickson PA-C    aspirin 81 mg Oral Daily Ramona Hendrickson PA-C    brimonidine-timolol 1 drop Both Eyes Daily Bharat, TANNER    cefTRIAXone 1,000 mg Intravenous Q24H Bharat, PA-C    donepezil 5 mg Oral HS Bharat, PA-OSKAR    epinephrine 1-10 mcg/min Intravenous Titrated [place], [place] Last Rate: 2 mcg/min (10/11/19 0215)   escitalopram 5 mg Oral Daily Bharat, PA-C    memantine 5 mg Oral Daily Bharat, PA-C    multi-electrolyte 50 mL/hr Intravenous Continuous [place], [place] Last Rate: 50 mL/hr (10/10/19 2207)   multivitamin-minerals 1 tablet Oral Daily Bharat, PA-C    ondansetron 4 mg Intravenous Q6H PRN Bharat, PA-C    pneumococcal 13-valent conjugate vaccine 0 5 mL Intramuscular Prior to discharge Bharat, TANNER    tamoxifen 20 mg Oral Daily Bharat, TANNER      Continuous Infusions:    epinephrine 1-10 mcg/min Last Rate: 2 mcg/min (10/11/19 0215)   multi-electrolyte 50 mL/hr Last Rate: 50 mL/hr (10/10/19 2207)     PRN Meds:     acetaminophen 650 mg Q6H PRN   ondansetron 4 mg Q6H PRN   pneumococcal 13-valent conjugate vaccine 0 5 mL Prior to discharge       ---------------------------------------------------------------------------------------  Advance Directive and Living Will: Yes    Power of : Yes  POLST:    ---------------------------------------------------------------------------------------  Counseling / Coordination of Care  Total Critical Care time spent 45 minutes excluding procedures, teaching and family updates  TANNER Nicholson        Portions of the record may have been created with voice recognition software  Occasional wrong word or "sound a like" substitutions may have occurred due to the inherent limitations of voice recognition software    Read the chart carefully and recognize, using context, where substitutions have occurred

## 2019-10-11 NOTE — ASSESSMENT & PLAN NOTE
- Met Severe sepsis criteria on admission and started on empiric abx targeting acute cystitis as source, procal negative this AM, Urine culture (+) for E  Coli, narrow to rocephin   - Liver with cystic lesions on CT scan, F/u with RUQ u/s  - Lactic cleared  - Follow Blood and Urine cultures for sensitivities  - Mental status currently at baseline, pt participating in exam  - D/t to episodes of hypotension and delayed lactic clearance patient was transferred to the ICU for Vasopressor/Inotrope support

## 2019-10-11 NOTE — ASSESSMENT & PLAN NOTE
Patient noted to have large acute bilateral PE on CTA chest with right heart strain along with small bilateral pleural effusions  Patient was unstable with acute PE  Blood pressures were been soft/hypotensive despite multiple fluid boluses previously and patient was transferred to the ICU  Patient was started on weight based lovenox and then transitioned to Eliquis    Continue Eliquis upon discharge  Venous Doppler showed acute occlusive DVT in popliteal vein, posterior tibial veins and gastrocnemius veins of right lower extremity

## 2019-10-11 NOTE — ASSESSMENT & PLAN NOTE
- Met Severe sepsis criteria on admission and started on empiric abx targeting acute cystitis as source, procal   negative this AM, Urine culture (+) for E  Coli, continue rocephin   - Liver with cystic lesions on CT scan, F/u with RUQ u/s  - Lactic cleared    - Mental status currently at baseline, pt participating in exam  - D/t to episodes of hypotension and delayed lactic clearance patient was transferred to the ICU for -Vasopressor/Inotrope support, epinephrine d/c at 0440 am

## 2019-10-11 NOTE — ASSESSMENT & PLAN NOTE
-improving  -Likely multi-factorial d/t presumed sepsis and now known clot burden causing right heart strain  - Epinephrine initiated for inotropic/vasoctive support   Titrate to MAP >65 d/c 0440 am  - scvo2 65

## 2019-10-11 NOTE — PROGRESS NOTES
ICU Progress Note - Cardiology   Geeta Michel 80 y o  female MRN: [id number]  Unit/Bed#: ICU 11 Encounter: [id number]    Assessment/Plan:  Bilateral pulmonary embolism/elevated pulmonary pressures/RV dysfunction- noted to have low blood pressures overnight started on pressor therapy  Also noted to have elevated and rising lactate  She has been on weight based Lovenox  Goals of care discussion with family being held  Type 2 myocardial infarction in the setting of bilateral pulmonary embolism- echo 2D without focal wall motion abnormalities    Dementia with behavioral disturbances currently on Namenda plus Aricept    Breast cancer with metastasis    Generalized weakness    Subjective/Objective   Transferred overnight to the ICU secondary to hemodynamic instability  Discussed abnormal echocardiogram findings with family    Objective:     Vitals: /67   Pulse (!) 118   Temp 98 2 °F (36 8 °C) (Temporal)   Resp (!) 28   Ht 5' 2" (1 575 m)   Wt 64 7 kg (142 lb 10 2 oz)   SpO2 94%   Breastfeeding? No   BMI 26 09 kg/m²   Vitals:    10/10/19 2020 10/11/19 0600   Weight: 64 7 kg (142 lb 10 2 oz) 64 7 kg (142 lb 10 2 oz)     Orthostatic Blood Pressures      Most Recent Value   Blood Pressure  105/67 filed at 10/11/2019 1545   Patient Position - Orthostatic VS  Lying filed at 10/10/2019 1948            Intake/Output Summary (Last 24 hours) at 10/11/2019 1620  Last data filed at 10/11/2019 0830  Gross per 24 hour   Intake 454 09 ml   Output    Net 454 09 ml       Invasive Devices     Central Venous Catheter Line            CVC Central Lines 10/10/19 Triple Right Internal jugular 1 day          Peripheral Intravenous Line            Peripheral IV 10/09/19 Left Forearm 1 day    Peripheral IV 10/10/19 Left;Proximal;Ventral (anterior) Forearm 1 day    Peripheral IV 10/10/19 Left;Upper Arm 1 day                Review of Systems: reviewed    Physical Exam   Constitutional: She has a sickly appearance   She appears ill  No distress  HENT:   Head: Normocephalic and atraumatic  Right Ear: External ear normal    Left Ear: External ear normal    Nose: Nose normal    Mouth/Throat: No oropharyngeal exudate  Eyes: Pupils are equal, round, and reactive to light  Conjunctivae are normal  Right eye exhibits no discharge  Left eye exhibits no discharge  No scleral icterus  Neck: Normal range of motion  JVD present  No tracheal deviation present  No thyromegaly present  Cardiovascular: Normal rate, regular rhythm and intact distal pulses  Exam reveals gallop  Exam reveals no friction rub  Murmur heard  Pulmonary/Chest: Effort normal  No stridor  No respiratory distress  She has no wheezes  She has no rales  She exhibits no tenderness  Abdominal: Soft  Bowel sounds are normal  She exhibits distension  She exhibits no mass  There is no tenderness  There is no rebound and no guarding  Genitourinary:   Genitourinary Comments: No CVA tenderness   Musculoskeletal: She exhibits deformity  She exhibits no edema or tenderness  Neurological: She is alert  She displays normal reflexes  She exhibits normal muscle tone  Skin: Skin is warm and dry  No rash noted  She is not diaphoretic  No erythema  Psychiatric: She has a normal mood and affect  Her behavior is normal  Judgment and thought content normal    Nursing note and vitals reviewed      Lab Results:   CBC with diff:   Results from last 7 days   Lab Units 10/11/19  0539   WBC Thousand/uL 16 19*   RBC Million/uL 3 12*   HEMOGLOBIN g/dL 9 6*   HEMATOCRIT % 30 7*   MCV fL 98   MCH pg 30 8   MCHC g/dL 31 3*   RDW % 15 6*   MPV fL 10 7   PLATELETS Thousands/uL 152     CMP:   Results from last 7 days   Lab Units 10/11/19  0539   SODIUM mmol/L 140   POTASSIUM mmol/L 3 7   CHLORIDE mmol/L 112*   CO2 mmol/L 17*   BUN mg/dL 23   CREATININE mg/dL 1 13   CALCIUM mg/dL 7 7*   AST U/L 55*   ALT U/L 95*   ALK PHOS U/L 39*   EGFR ml/min/1 73sq m 43     Troponin:   0   Lab Value Date/Time    TROPONINI 0 45 (H) 10/09/2019 2010    TROPONINI 0 54 (H) 10/09/2019 1658    TROPONINI 0 47 (H) 10/09/2019 1356    TROPONINI 0 45 (H) 10/09/2019 0951    TROPONINI <0 02 01/11/2018 1540     BNP:   Results from last 7 days   Lab Units 10/11/19  0539   POTASSIUM mmol/L 3 7   CHLORIDE mmol/L 112*   CO2 mmol/L 17*   BUN mg/dL 23   CREATININE mg/dL 1 13   CALCIUM mg/dL 7 7*   EGFR ml/min/1 73sq m 43     Coags:   Results from last 7 days   Lab Units 10/09/19  0951   PTT seconds 22*   INR  1 04     Imaging: I have personally reviewed pertinent reports  EKG: reviewed   VTE Pharmacologic Prophylaxis: Enoxaparin (Lovenox)    Counseling / Coordination of Care  Total time spent today 40 minutes  Greater than 50% of total time was spent with the patient and / or family counseling and / or coordination of care   A description of the counseling / coordination of care: bilateral pe/rv strain

## 2019-10-11 NOTE — SOCIAL WORK
CM spoke with the son this am   They have not yet gotten to discuss STR choices as pt has taken a turn for the worse and was brought to the ICU  Son has the list and will discuss with his sisters placement options  JOSHUA then received a call from the pt's dtr re: poss hospice placement  JSOHUA spoke with them re: the SNF options for [address] coverage and then the options for DC including SNF/STR placement vs comfort care or home hospice services  Pt remains under the care of ICU medicine, will continue to see how she responds to this care to see what the options may be based on pt recovery potential   Family will discuss it together and contact CM is they may want Hospice referral for more information to assist in decision making process

## 2019-10-11 NOTE — ASSESSMENT & PLAN NOTE
- 10/10 Echo: EF 60%, grd 1 DD, TAPSE 1 3, Ventricular Septum w/ mod  Sys  Flattening    - 10/10 CT PE Study: Large acute bilateral pulmonary emboli with new small bilateral pleural effusions and CT evidence of RIGHT HEART STRAIN  Small ascending thoracic aortic aneurysm measuring 4 1 cm  Hiatal hernia  Dependent atelectasis in the lung bases  - Patient started on Therapeutic lovenox  1mg/kg daily   - Started on Epinephrine for Inotropic/pressor support d/t clot burden and Rt heart strain, titrate as needed  - Family is aware of patients tenuous status

## 2019-10-11 NOTE — PROGRESS NOTES
Patient is moved to ICU patient examined spoke with the nurse  Patient is alert awake resting in the bed she was more confused today she was able to tell me that she is in the hospital but she could not tell me her age she does not know why she is here then I asked few more questions she responded by saying''I do not remember anymore''patient has ongoing medical problems including breast cancer and possible metastasis  Patient is suffering from dementia with depression no side effects of Lexapro Namenda and Aricept noted  No behavior problems  Patient is severely weak withdrawn periods of confusion memory loss she needs help with ADL care  Patient can be difficult demanding needy and verbally nasty but she overall doing all right and manageable I will not order more medications to avoid the sedation  Patient is not suicidal   Medical treatment is in progress  Therapy done with good response  I will follow up

## 2019-10-11 NOTE — ASSESSMENT & PLAN NOTE
- 10/10 Echo: EF 60%, grd 1 DD, TAPSE 1 3, Ventricular Septum w/ mod  Sys  Flattening    - 10/10 CT PE Study: Large acute bilateral pulmonary emboli with new small bilateral pleural effusions and CT evidence of RIGHT HEART STRAIN  Small ascending thoracic aortic aneurysm measuring 4 1 cm  Hiatal hernia  Dependent atelectasis in the lung bases  - Patient started on Therapeutic lovenox  1mg/kg daily   - D/t continued hypotension throughout shift goals of care discussion held with patients children  Ultimately it was decided that the patient be transferred to ICU as Critical care for central line insertion and initiation of vasopressor/inotropic medications to sustain MAP >65  Family is aware of patients tenuous status

## 2019-10-12 PROBLEM — Z51.5 COMFORT MEASURES ONLY STATUS: Status: ACTIVE | Noted: 2019-10-12

## 2019-10-12 PROBLEM — R65.20 SEVERE SEPSIS (HCC): Status: RESOLVED | Noted: 2019-10-09 | Resolved: 2019-10-12

## 2019-10-12 PROBLEM — R57.9 SHOCK (HCC): Status: RESOLVED | Noted: 2019-10-11 | Resolved: 2019-10-12

## 2019-10-12 PROBLEM — A41.9 SEVERE SEPSIS (HCC): Status: RESOLVED | Noted: 2019-10-09 | Resolved: 2019-10-12

## 2019-10-12 LAB
ALBUMIN SERPL BCP-MCNC: 2.6 G/DL (ref 3.5–5)
ALP SERPL-CCNC: 48 U/L (ref 46–116)
ALT SERPL W P-5'-P-CCNC: 122 U/L (ref 12–78)
ANION GAP SERPL CALCULATED.3IONS-SCNC: 10 MMOL/L (ref 4–13)
AST SERPL W P-5'-P-CCNC: 56 U/L (ref 5–45)
BILIRUB SERPL-MCNC: 0.3 MG/DL (ref 0.2–1)
BUN SERPL-MCNC: 35 MG/DL (ref 5–25)
CALCIUM SERPL-MCNC: 7.8 MG/DL (ref 8.3–10.1)
CHLORIDE SERPL-SCNC: 111 MMOL/L (ref 100–108)
CO2 SERPL-SCNC: 18 MMOL/L (ref 21–32)
CREAT SERPL-MCNC: 1.51 MG/DL (ref 0.6–1.3)
ERYTHROCYTE [DISTWIDTH] IN BLOOD BY AUTOMATED COUNT: 15.9 % (ref 11.6–15.1)
GFR SERPL CREATININE-BSD FRML MDRD: 31 ML/MIN/1.73SQ M
GLUCOSE SERPL-MCNC: 121 MG/DL (ref 65–140)
HCT VFR BLD AUTO: 30.2 % (ref 34.8–46.1)
HGB BLD-MCNC: 9.5 G/DL (ref 11.5–15.4)
LACTATE SERPL-SCNC: 1.3 MMOL/L (ref 0.5–2)
MAGNESIUM SERPL-MCNC: 2.8 MG/DL (ref 1.6–2.6)
MCH RBC QN AUTO: 30.6 PG (ref 26.8–34.3)
MCHC RBC AUTO-ENTMCNC: 31.5 G/DL (ref 31.4–37.4)
MCV RBC AUTO: 97 FL (ref 82–98)
MRSA NOSE QL CULT: NORMAL
PHOSPHATE SERPL-MCNC: 3.4 MG/DL (ref 2.3–4.1)
PLATELET # BLD AUTO: 175 THOUSANDS/UL (ref 149–390)
PMV BLD AUTO: 11.1 FL (ref 8.9–12.7)
POTASSIUM SERPL-SCNC: 4.5 MMOL/L (ref 3.5–5.3)
PROCALCITONIN SERPL-MCNC: 0.61 NG/ML
PROT SERPL-MCNC: 5.7 G/DL (ref 6.4–8.2)
RBC # BLD AUTO: 3.1 MILLION/UL (ref 3.81–5.12)
SODIUM SERPL-SCNC: 139 MMOL/L (ref 136–145)
WBC # BLD AUTO: 16.48 THOUSAND/UL (ref 4.31–10.16)

## 2019-10-12 PROCEDURE — 83735 ASSAY OF MAGNESIUM: CPT | Performed by: STUDENT IN AN ORGANIZED HEALTH CARE EDUCATION/TRAINING PROGRAM

## 2019-10-12 PROCEDURE — 85027 COMPLETE CBC AUTOMATED: CPT | Performed by: STUDENT IN AN ORGANIZED HEALTH CARE EDUCATION/TRAINING PROGRAM

## 2019-10-12 PROCEDURE — 99233 SBSQ HOSP IP/OBS HIGH 50: CPT | Performed by: ANESTHESIOLOGY

## 2019-10-12 PROCEDURE — 80053 COMPREHEN METABOLIC PANEL: CPT | Performed by: STUDENT IN AN ORGANIZED HEALTH CARE EDUCATION/TRAINING PROGRAM

## 2019-10-12 PROCEDURE — NC001 PR NO CHARGE: Performed by: NURSE PRACTITIONER

## 2019-10-12 PROCEDURE — 99233 SBSQ HOSP IP/OBS HIGH 50: CPT | Performed by: PHYSICIAN ASSISTANT

## 2019-10-12 PROCEDURE — 99232 SBSQ HOSP IP/OBS MODERATE 35: CPT | Performed by: INTERNAL MEDICINE

## 2019-10-12 PROCEDURE — NC001 PR NO CHARGE: Performed by: ANESTHESIOLOGY

## 2019-10-12 PROCEDURE — 83605 ASSAY OF LACTIC ACID: CPT | Performed by: ANESTHESIOLOGY

## 2019-10-12 PROCEDURE — 84145 PROCALCITONIN (PCT): CPT | Performed by: STUDENT IN AN ORGANIZED HEALTH CARE EDUCATION/TRAINING PROGRAM

## 2019-10-12 PROCEDURE — 84100 ASSAY OF PHOSPHORUS: CPT | Performed by: STUDENT IN AN ORGANIZED HEALTH CARE EDUCATION/TRAINING PROGRAM

## 2019-10-12 RX ORDER — GLYCOPYRROLATE 0.2 MG/ML
0.2 INJECTION INTRAMUSCULAR; INTRAVENOUS
Status: DISCONTINUED | OUTPATIENT
Start: 2019-10-12 | End: 2019-10-14

## 2019-10-12 RX ADMIN — TAMOXIFEN CITRATE 20 MG: 10 TABLET, FILM COATED ORAL at 09:06

## 2019-10-12 RX ADMIN — ESCITALOPRAM 5 MG: 5 TABLET, FILM COATED ORAL at 09:05

## 2019-10-12 RX ADMIN — CEFTRIAXONE 1000 MG: 1 INJECTION, SOLUTION INTRAVENOUS at 05:07

## 2019-10-12 RX ADMIN — ENOXAPARIN SODIUM 60 MG: 60 INJECTION SUBCUTANEOUS at 09:05

## 2019-10-12 RX ADMIN — EPINEPHRINE 4 MCG/MIN: 1 INJECTION, SOLUTION, CONCENTRATE INTRAVENOUS at 01:44

## 2019-10-12 RX ADMIN — MEMANTINE 5 MG: 10 TABLET ORAL at 09:06

## 2019-10-12 RX ADMIN — ASPIRIN 81 MG: 81 TABLET, COATED ORAL at 09:05

## 2019-10-12 RX ADMIN — Medication 1 TABLET: at 09:06

## 2019-10-12 NOTE — ASSESSMENT & PLAN NOTE
- 10/10 Echo: EF 60%, grd 1 DD, TAPSE 1 3, Ventricular Septum w/ mod  Sys  Flattening    - 10/10 CT PE Study: Large acute bilateral pulmonary emboli with new small bilateral pleural effusions and CT evidence of RIGHT HEART STRAIN  Small ascending thoracic aortic aneurysm measuring 4 1 cm  Hiatal hernia  Dependent atelectasis in the lung bases  - Patient started on Therapeutic lovenox  1mg/kg daily   - Started on Epinephrine for Inotropic/pressor support d/t clot burden and Rt heart strain, titrate as needed   -Epinephrine off since 0441 am  - Family is aware of patients tenuous status    - patient transition to comfort care

## 2019-10-12 NOTE — SOCIAL WORK
Met with pt's family this morning  Rec'd consult for hospice  Discussed different places for hospice  Family concern is out of pocket expense for NF or hospice house  Explained GIP and comfort care  To be evaluated by [facility] for GIP edwar   Did speak to [place] liaison for [facility]  Explained pt is not imminent from visiting pt  Requested he contact pt's family to discuss options and set up a meeting with them if it is their wishes  Staff can called [facility] if they feel pt is becoming symptomatic  Also will speak to [address] [place] [address] about discussing comfort care with family

## 2019-10-12 NOTE — PROGRESS NOTES
Progress Note/Transfer Vidya Johnson 12/17/1930, 80 y o  female MRN: [id number]    Unit/Bed#: ICU 11 Encounter: [id number]    Primary Care Provider: Puneet Arreguin MD   Date and time admitted to hospital: 10/9/2019  9:24 AM        Pulmonary embolism ([facility] [address] )  Assessment & Plan  - 10/10 Echo: EF 60%, grd 1 DD, TAPSE 1 3, Ventricular Septum w/ mod  Sys  Flattening    - 10/10 CT PE Study: Large acute bilateral pulmonary emboli with new small bilateral pleural effusions and CT evidence of RIGHT HEART STRAIN  Small ascending thoracic aortic aneurysm measuring 4 1 cm  Hiatal hernia  Dependent atelectasis in the lung bases  - Patient started on Therapeutic lovenox  1mg/kg daily   - Started on Epinephrine for Inotropic/pressor support d/t clot burden and Rt heart strain, titrate as needed   -Epinephrine off since 0441 am  - Family is aware of patients tenuous status  - patient transition to comfort care    * Severe sepsis (HCC)resolved as of 10/12/2019  Assessment & Plan  - Met Severe sepsis criteria on admission and started on empiric abx targeting acute cystitis as source, procal   negative this AM, Urine culture (+) for E  Coli, continue rocephin   - Liver with cystic lesions on CT scan, F/u with RUQ u/s  - Lactic cleared  - Mental status currently at baseline, pt participating in exam  - D/t to episodes of hypotension and delayed lactic clearance patient was transferred to the ICU for -Vasopressor/Inotrope support, epinephrine d/c at 0440 am    Comfort measures only status  Assessment & Plan  · After multiple conversations regarding goals of care, family decided to place patient on comfort measures only with no escalation of care      Transaminitis  Assessment & Plan  - Likely d/t periods of hypotension, trend LFTs and f/u with RUQ U/S    Moderate dementia with behavioral disturbance ([facility] [address] )  Assessment & Plan  Continue at home medication regimen    Acute cystitis  Assessment & Plan  - patient completed a 3 day course of Rocephin    Type 2 myocardial infarction [facility])  Assessment & Plan  - Troponin peaked at  54, now trending down, aspirin ordered, Cardiology following   - Echo results noted above     Liver mass  Assessment & Plan    -New Transaminitis, f/u RUQ U/S of liver mass, trend LFTs daily    Malignant neoplasm of overlapping sites of right breast in female, estrogen receptor positive ([address] )  Assessment & Plan  Pt on tamoxifen, which has been discontinued  Patient family wishes to proceed comfort measures    Essential hypertension  Assessment & Plan  Hold lisinopril for increased creatine and in light of recent vasopressor titration  Shock (HCC)resolved as of 10/12/2019  Assessment & Plan  -improving  -Likely multi-factorial d/t presumed sepsis and now known clot burden causing right heart strain  - Epinephrine initiated for inotropic/vasoctive support  Titrate to MAP >65 d/c 0440 am  - scvo2 65      Transfer Note - ICU/Stepdown [facility]   Misty Ji 80 y o  female MRN: [id number]  [facility]   Unit/Bed#: ICU 11 Encounter: [id number]    Code Status: Level 4 - Comfort Care    Reason for ICU/Stepdown admission:  Severe sepsis/acute PE with cor pulmonale    Active problems:     Pulmonary embolism (HCC)  Assessment & Plan  - 10/10 Echo: EF 60%, grd 1 DD, TAPSE 1 3, Ventricular Septum w/ mod  Sys  Flattening    - 10/10 CT PE Study: Large acute bilateral pulmonary emboli with new small bilateral pleural effusions and CT evidence of RIGHT HEART STRAIN  Small ascending thoracic aortic aneurysm measuring 4 1 cm  Hiatal hernia  Dependent atelectasis in the lung bases  - Patient started on Therapeutic lovenox  1mg/kg daily   - Started on Epinephrine for Inotropic/pressor support d/t clot burden and Rt heart strain, titrate as needed   -Epinephrine off since 0441 am  - Family is aware of patients tenuous status    - patient transition to comfort care    * Severe sepsis (HCC)resolved as of 10/12/2019  Assessment & Plan  - Met Severe sepsis criteria on admission and started on empiric abx targeting acute cystitis as source, procal   negative this AM, Urine culture (+) for E  Coli, continue rocephin   - Liver with cystic lesions on CT scan, F/u with RUQ u/s  - Lactic cleared  - Mental status currently at baseline, pt participating in exam  - D/t to episodes of hypotension and delayed lactic clearance patient was transferred to the ICU for -Vasopressor/Inotrope support, epinephrine d/c at 0440 am    Comfort measures only status  Assessment & Plan  · After multiple conversations regarding goals of care, family decided to place patient on comfort measures only with no escalation of care  Transaminitis  Assessment & Plan  - Likely d/t periods of hypotension, trend LFTs and f/u with RUQ U/S    Moderate dementia with behavioral disturbance (HCC)  Assessment & Plan  Continue at home medication regimen    Acute cystitis  Assessment & Plan  - patient completed a 3 day course of Rocephin    Type 2 myocardial infarction [facility])  Assessment & Plan  - Troponin peaked at  54, now trending down, aspirin ordered, Cardiology following   - Echo results noted above     Liver mass  Assessment & Plan    -New Transaminitis, f/u RUQ U/S of liver mass, trend LFTs daily    Malignant neoplasm of overlapping sites of right breast in female, estrogen receptor positive ([facility] [address] )  Assessment & Plan  Pt on tamoxifen, which has been discontinued  Patient family wishes to proceed comfort measures    Essential hypertension  Assessment & Plan  Hold lisinopril for increased creatine and in light of recent vasopressor titration  Shock (HCC)resolved as of 10/12/2019  Assessment & Plan  -improving  -Likely multi-factorial d/t presumed sepsis and now known clot burden causing right heart strain  - Epinephrine initiated for inotropic/vasoctive support   Titrate to MAP >65 d/c 0440 am  - scvo2 65        Consultants: · Hematology-Oncology  · Cardiology    History of Present Illness/Summary of clinical course:   75-year-old female with past medical history of Alzheimer's dementia, hypertension, breast cancer with concern for liver metastasis  She was initially admitted on October 9th for diarrhea and generalized weakness  She was being treated as severe sepsis secondary to UTI by the hospitalist service  On the floor she developed persistent hypotension and lactic acidosis worsened  TTE demonstrated reduced RV systolic function and CT PE protocol was obtained which revealed a large acute bilateral PE with evidence of right heart strain  Patient was transferred to the ICU for further workup and monitoring  Patient was initiated on epinephrine infusion for hemodynamic support  In the ICU ongoing goals of care discussion with patient and her 2 daughters  Expressed consideration of hospice/palliative care given pt's overall poor prognosis an concern for metastases  Hospice consult placed through case management  Family has decided to make patient comfort care measures only with no escalation of care  Please refer to today's progress note for further clinical details  Recent or scheduled procedures:   VAS lower limb venous duplex study, complete bilateral   Final Result      US right upper quadrant   Final Result      Limited study  Small hepatic cysts  Cholelithiasis without evidence of acute cholecystitis  Normal caliber common bile duct  Workstation performed: [id number]         XR chest portable   Final Result      No acute cardiopulmonary disease  Central line on the right  Workstation performed: [id number]         CTA chest pe study   Final Result      Large acute bilateral pulmonary emboli with new small bilateral pleural effusions and CT evidence of RIGHT HEART STRAIN  Small ascending thoracic aortic aneurysm measuring 4 1 cm  Hiatal hernia        Dependent atelectasis in the lung bases                    I personally discussed this study with [facility] on 10/10/2019 at 3:48 PM                      Workstation performed: [id number]         CT chest abdomen pelvis wo contrast   Final Result      There are several indeterminate but potentially suspicious liver masses which might be metastatic disease  There also appear to be a couple liver cysts  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  There is a hiatal hernia  Right mastectomy  Indeterminate but probably cystic left renal lesion measures 2 9 cm  Mild colonic diverticulosis      The study was marked in EPIC for immediate notification  Workstation performed: [id number]         XR chest 1 view portable   Final Result      No acute cardiopulmonary disease  Workstation performed: [id number]             Outstanding/pending diagnostics:   NA       Mobilization Plan:  As tolerated    Nutrition Plan:  Regular diet    Discharge Plan: Patient should be ready for discharge to facility for palliative/hospice care pending placement  Specific Diagnosis Plan:    Sepsis: Source (cultures):  UTI, Antibiotics:  Ceftriaxone, Length:  Completed 3 day course; Need for Infectious Disease consult:  No        [  ] Family aware of transfer out of critical care: yes   [  ] Home medications that are not reordered and reason why:  Patient transition to comfort care      Spoke with Dr Yariel Woo regarding transfer @ 1700  Patient accepted to their service      RUTH Aguirre

## 2019-10-12 NOTE — PLAN OF CARE
Problem: Potential for Falls  Goal: Patient will remain free of falls  Description  INTERVENTIONS:  - Assess patient frequently for physical needs  -  Identify cognitive and physical deficits and behaviors that affect risk of falls  -  [place] fall precautions as indicated by assessment   - Educate patient/family on patient safety including physical limitations  - Instruct patient to call for assistance with activity based on assessment  - Modify environment to reduce risk of injury  - Consider OT/PT consult to assist with strengthening/mobility  Outcome: Progressing     Problem: Nutrition/Hydration-ADULT  Goal: Nutrient/Hydration intake appropriate for improving, restoring or maintaining nutritional needs  Description  Monitor and assess patient's nutrition/hydration status for malnutrition  Collaborate with interdisciplinary team and initiate plan and interventions as ordered  Monitor patient's weight and dietary intake as ordered or per policy  Utilize nutrition screening tool and intervene as necessary  Determine patient's food preferences and provide high-protein, high-caloric foods as appropriate       INTERVENTIONS:  - Monitor oral intake, urinary output, labs, and treatment plans  - Assess nutrition and hydration status and recommend course of action  - Evaluate amount of meals eaten  - Assist patient with eating if necessary   - Allow adequate time for meals  - Recommend/ encourage appropriate diets, oral nutritional supplements, and vitamin/mineral supplements  - Order, calculate, and assess calorie counts as needed  - Assess need for intravenous fluids  - Provide specific nutrition/hydration education as appropriate  - Include patient/family/caregiver in decisions related to nutrition   Outcome: Progressing     Problem: Prexisting or High Potential for Compromised Skin Integrity  Goal: Skin integrity is maintained or improved  Description  INTERVENTIONS:  - Identify patients at risk for skin breakdown  - Assess and monitor skin integrity  - Assess and monitor nutrition and hydration status  - Monitor labs   - Assess for incontinence   - Turn and reposition patient  - Assist with mobility/ambulation  - Relieve pressure over bony prominences  - Avoid friction and shearing  - Provide appropriate hygiene as needed including keeping skin clean and dry  - Evaluate need for skin moisturizer/barrier cream  - Collaborate with interdisciplinary team   - Patient/family teaching  - Consider wound care consult   Outcome: Progressing     Problem: PAIN - ADULT  Goal: Verbalizes/displays adequate comfort level or baseline comfort level  Description  Interventions:  - Encourage patient to monitor pain and request assistance  - Assess pain using appropriate pain scale  - Administer analgesics based on type and severity of pain and evaluate response  - Implement non-pharmacological measures as appropriate and evaluate response  - Consider cultural and social influences on pain and pain management  - Notify physician/advanced practitioner if interventions unsuccessful or patient reports new pain  Outcome: Progressing     Problem: INFECTION - ADULT  Goal: Absence or prevention of progression during hospitalization  Description  INTERVENTIONS:  - Assess and monitor for signs and symptoms of infection  - Monitor lab/diagnostic results  - Monitor all insertion sites, i e  indwelling lines, tubes, and drains  - Monitor endotracheal if appropriate and nasal secretions for changes in amount and color  - [place] appropriate cooling/warming therapies per order  - Administer medications as ordered  - Instruct and encourage patient and family to use good hand hygiene technique  - Identify and instruct in appropriate isolation precautions for identified infection/condition  Outcome: Progressing  Goal: Absence of fever/infection during neutropenic period  Description  INTERVENTIONS:  - Monitor WBC    Outcome: Progressing     Problem: SAFETY ADULT  Goal: Maintain or return to baseline ADL function  Description  INTERVENTIONS:  -  Assess patient's ability to carry out ADLs; assess patient's baseline for ADL function and identify physical deficits which impact ability to perform ADLs (bathing, care of mouth/teeth, toileting, grooming, dressing, etc )  - Assess/evaluate cause of self-care deficits   - Assess range of motion  - Assess patient's mobility; develop plan if impaired  - Assess patient's need for assistive devices and provide as appropriate  - Encourage maximum independence but intervene and supervise when necessary  - Involve family in performance of ADLs  - Assess for home care needs following discharge   - Consider OT consult to assist with ADL evaluation and planning for discharge  - Provide patient education as appropriate  Outcome: Progressing  Goal: Maintain or return mobility status to optimal level  Description  INTERVENTIONS:  - Assess patient's baseline mobility status (ambulation, transfers, stairs, etc )    - Identify cognitive and physical deficits and behaviors that affect mobility  - Identify mobility aids required to assist with transfers and/or ambulation (gait belt, sit-to-stand, lift, walker, cane, etc )  - [place] fall precautions as indicated by assessment  - Record patient progress and toleration of activity level on Mobility SBAR; progress patient to next Phase/Stage  - Instruct patient to call for assistance with activity based on assessment  - Consider rehabilitation consult to assist with strengthening/weightbearing, etc   Outcome: Progressing     Problem: DISCHARGE PLANNING  Goal: Discharge to home or other facility with appropriate resources  Description  INTERVENTIONS:  - Identify barriers to discharge w/patient and caregiver  - Arrange for needed discharge resources and transportation as appropriate  - Identify discharge learning needs (meds, wound care, etc )  - Arrange for interpretive services to assist at discharge as needed  - Refer to Case Management Department for coordinating discharge planning if the patient needs post-hospital services based on physician/advanced practitioner order or complex needs related to functional status, cognitive ability, or social support system  Outcome: Progressing     Problem: Knowledge Deficit  Goal: Patient/family/caregiver demonstrates understanding of disease process, treatment plan, medications, and discharge instructions  Description  Complete learning assessment and assess knowledge base    Interventions:  - Provide teaching at level of understanding  - Provide teaching via preferred learning methods  Outcome: Progressing     Problem: CARDIOVASCULAR - ADULT  Goal: Maintains optimal cardiac output and hemodynamic stability  Description  INTERVENTIONS:  - Monitor I/O, vital signs and rhythm  - Monitor for S/S and trends of decreased cardiac output  - Administer and titrate ordered vasoactive medications to optimize hemodynamic stability  - Assess quality of pulses, skin color and temperature  - Assess for signs of decreased coronary artery perfusion  - Instruct patient to report change in severity of symptoms  Outcome: Progressing  Goal: Absence of cardiac dysrhythmias or at baseline rhythm  Description  INTERVENTIONS:  - Continuous cardiac monitoring, vital signs, obtain 12 lead EKG if ordered  - Administer antiarrhythmic and heart rate control medications as ordered  - Monitor electrolytes and administer replacement therapy as ordered  Outcome: Progressing     Problem: GENITOURINARY - ADULT  Goal: Maintains or returns to baseline urinary function  Description  INTERVENTIONS:  - Assess urinary function  - Encourage oral fluids to ensure adequate hydration if ordered  - Administer IV fluids as ordered to ensure adequate hydration  - Administer ordered medications as needed  - Offer frequent toileting  - Follow urinary retention protocol if ordered  Outcome: Progressing  Goal: Absence of urinary retention  Description  INTERVENTIONS:  - Assess patients ability to void and empty bladder  - Monitor I/O  - Bladder scan as needed  - Discuss with physician/AP medications to alleviate retention as needed  - Discuss catheterization for long term situations as appropriate  Outcome: Progressing  Goal: Urinary catheter remains patent  Description  INTERVENTIONS:  - Assess patency of urinary catheter  - If patient has a chronic diaz, consider changing catheter if non-functioning  - Follow guidelines for intermittent irrigation of non-functioning urinary catheter  Outcome: Progressing

## 2019-10-12 NOTE — PROGRESS NOTES
Progress Note -  Hematology/Oncology   Larry Myers 80 y o  female MRN: [id number]  Unit/Bed#: ICU 11 Encounter: [id number]    Attending Hospital Physician: Darius Moreno MD  Date of Initial 2020 [facility] Consultation:  10/10/2019  Reason for Consultation:  Breast cancer, pulmonary embolism    Please see Dr Willy Palomares initial hospital consult note dated 10/10/2019 for admission details  HPI: Larry Myers is a 80y o  year old female with a history of stage IIIB right breast cancer diagnosed 5 years ago, s/p right mastectomy, maintained on tamoxifen since diagnosis  She presented 10/9/2019 after a fall at home and was diagnosed with a new pulmonary embolism and multiple new hepatic lesions and bilateral effusions concerning for progressive breast cancer to stage IV  She has been initiated on Lovenox for pulmonary embolism  Discussions have been on going regarding goals of care in the setting of highly suspicious metastatic breast cancer  Assessment/Plan:   #1 History of stage IIIB right-sided breast cancer, on tamoxifen  #2 New hepatic lesions, suspect 2/2 #1  #3 Bilateral PE, on Lovenox  -patient has been on tamoxifen since her initial diagnosis of stage IIIB breast cancer  This admission, she was found to have multiple hepatic lesions which are new from prior study (PET/CT 5/2016) and suspicious for hepatic involvement of breast cancer  Additionally, she has new bilateral pulmonary embolism which could be provoked in the setting of immobility as well as active progressing malignancy (hypercoagulable state)  -at this time, discussions have been ongoing with the family and the patient regarding next steps in treatment  Systemic therapy would be indicated for treating advanced breast cancer since she had appears to have progressed on tamoxifen    At this time we do not recommend biopsy of the liver lesions to prove stage IV malignancy, however if the patient and family wish to pursue active treatment this would be reasonable   -I discussed the above with the family and the patient at the bedside  The patient's son and 2 daughters were present   -ultimately, they have decided on palliative treatment going forward and discuss with hospice   -I answered multiple questions regarding the stage of malignancy as well as treatment regimens which are likely to cause side effects and impair the patient's quality of life going forward  I discussed the role of tamoxifen as well, currently not controlling her disease and therefore recommend discontinuing the same  Family was in agreement for this  I have discussed this with the primary team in the ICU with recommendations to discontinue tamoxifen     -she is currently on Lovenox for the pulmonary embolism, recommend transition to an oral agent such as apixaban or rivaroxaban rather than Lovenox injections  Recommend continuing therapeutic anticoagulation for palliative reasons (dyspnea, CP)  I have also discussed this with the primary team in the ICU     -I believe all questions were answered to the family satisfaction today and they have made a decision for quality of life going forward  I will update Dr Garrick Sheth in the outpatient medical oncology team with this regard  The family understands he can contact us at any time if they have any additional questions  Recommendations discussed with the primary team on 10/12/2019     Please contact us if you have any questions  Subjective:  Patient tells me today she does not have any pain  She has some mild dyspnea but no chest pain,  otherwise no complaints other than  "when" she can go home and what is taking so long  Family at the bedside states she has had no complaints today as well  Review of Systems   All other systems reviewed and are negative  The remainder of a 12 point review of systems was negative      Objective:  /78   Pulse 92   Temp 98 1 °F (36 7 °C) (Temporal) Resp 17   Ht 5' 2" (1 575 m)   Wt 64 5 kg (142 lb 3 2 oz)   SpO2 96%   Breastfeeding? No   BMI 26 01 kg/m²     Physical Exam   Constitutional: No distress  Pulmonary/Chest: Effort normal    Skin: Skin is warm and dry  Vitals reviewed  Recent Labs     10/10/19  0431 10/11/19  0539 10/12/19  0502   WBC 23 37* 16 19* 16 48*   HGB 10 4* 9 6* 9 5*    152 175   * 98 97   RDW 15 3* 15 6* 15 9*   CREATININE 1 07 1 13 1 51*   AST 86* 55* 56*   * 95* 122*       Laboratory studies were reviewed      Code Status: Level 3 - DNAR and DNI    Counseling / Coordination of Care  Total floor / unit time spent today 20 minutes  Greater than 50% of total time was spent with the patient and / or family counseling and / or coordination of care

## 2019-10-12 NOTE — PROGRESS NOTES
Progress Note Avelino Quintero 12/17/1930, 80 y o  female MRN: [id number]    Unit/Bed#: ICU 11 Encounter: [id number]    Primary Care Provider: Charlesetta Mortimer, MD   Date and time admitted to hospital: 10/9/2019  9:24 AM        Pulmonary embolism ([address] )  Assessment & Plan  - 10/10 Echo: EF 60%, grd 1 DD, TAPSE 1 3, Ventricular Septum w/ mod  Sys  Flattening    - 10/10 CT PE Study: Large acute bilateral pulmonary emboli with new small bilateral pleural effusions and CT evidence of RIGHT HEART STRAIN  Small ascending thoracic aortic aneurysm measuring 4 1 cm  Hiatal hernia  Dependent atelectasis in the lung bases  - Patient started on Therapeutic lovenox  1mg/kg daily   - Started on Epinephrine for Inotropic/pressor support d/t clot burden and Rt heart strain, titrate as needed   -Epinephrine off since 0441 am  - Family is aware of patients tenuous status  * Severe sepsis (HCC)  Assessment & Plan  - Met Severe sepsis criteria on admission and started on empiric abx targeting acute cystitis as source, procal   negative this AM, Urine culture (+) for E  Coli, continue rocephin   - Liver with cystic lesions on CT scan, F/u with RUQ u/s  - Lactic cleared  - Mental status currently at baseline, pt participating in exam  - D/t to episodes of hypotension and delayed lactic clearance patient was transferred to the ICU for -Vasopressor/Inotrope support, epinephrine d/c at 0440 am    [facility])  Assessment & Plan  -improving  -Likely multi-factorial d/t presumed sepsis and now known clot burden causing right heart strain  - Epinephrine initiated for inotropic/vasoctive support   Titrate to MAP >65 d/c 0440 am  - scvo2 65    Transaminitis  Assessment & Plan  - Likely d/t periods of hypotension, trend LFTs and f/u with RUQ U/S    Moderate dementia with behavioral disturbance (HCC)  Assessment & Plan  Continue at home medication regimen    Acute cystitis  Assessment & Plan  - Continue Rocephin, plan 3 days total abx course    Type 2 myocardial infarction [facility])  Assessment & Plan  - Troponin peaked at  54, now trending down, aspirin ordered, Cardiology following   - Echo results noted above     Liver mass  Assessment & Plan  - New Transaminitis, f/u RUQ U/S of liver mass, trend LFTs daily    Malignant neoplasm of overlapping sites of right breast in female, estrogen receptor positive ([address] )  Assessment & Plan  Pt on tamoxifen    Essential hypertension  Assessment & Plan  Hold lisinopril for increased creatine and in light of recent vasopressor titration          ----------------------------------------------------------------------------------------  HPI/24hr events: Pt was transferred to the critical care unit 10/10 secondary to Acute PE with cor pulmonale, obstructive shock secondary to large clot burden and persistent lactic acidosis  Pt was initiated on epinephrine as TTE was shown to have RV dysfunction/heart strain  Pt had improvement in acidosis and epinephrine has been weaned off since 0440 this morning, tachycardia has improved with epinephrine off  Family at bedside this morning wishing to continue goals of care discussions  At this time pt and family are thinking in terms of comfort care/hospice  Disposition: Transfer to Stepdown Level 1   Code Status: Level 3 - DNAR and DNI  ---------------------------------------------------------------------------------------  SUBJECTIVE      Review of Systems  Review of systems was unable to be performed secondary to advanced dementia  ---------------------------------------------------------------------------------------  OBJECTIVE    Physical Exam   Constitutional: She appears well-developed and well-nourished  No distress  Nasal cannula in place  HENT:   Head: Normocephalic and atraumatic  Mouth/Throat: Oropharynx is clear and moist and mucous membranes are normal    Eyes: Pupils are equal, round, and reactive to light   Conjunctivae are normal    Neck: Normal range of motion  Neck supple  Cardiovascular: S1 normal, S2 normal and intact distal pulses  Tachycardia present  Pulmonary/Chest: Tachypnea noted  She has decreased breath sounds  Abdominal: Soft  Bowel sounds are normal    Musculoskeletal: Normal range of motion  Generalized weakness   Neurological: She is alert  She is disoriented  GCS eye subscore is 4  GCS verbal subscore is 4  GCS motor subscore is 6  Pt is alert and oriented to self, she is disoriented to situation, place and time  She is able to hold a conversation, but is very confused and needs frequent reorientation  She follows simple commands  Skin: Skin is warm and dry  Capillary refill takes less than 2 seconds  There is pallor  Nursing note and vitals reviewed  Vitals   Vitals:    10/12/19 0700 10/12/19 0703 10/12/19 0730 10/12/19 0800   BP: 91/55  117/67 90/57   BP Location:       Pulse: 92  93 87   Resp:    Temp:  97 8 °F (36 6 °C)     TempSrc:  Temporal     SpO2: 91%  95% 96%   Weight:       Height:         Temp (24hrs), Av 1 °F (36 7 °C), Min:97 1 °F (36 2 °C), Max:99 °F (37 2 °C)  Current: Temperature: 97 8 °F (36 6 °C)      Invasive/non-invasive ventilation settings   Respiratory    Lab Data (Last 4 hours)    None         O2/Vent Data (Last 4 hours)    None                Height and Weights   Height: 5' 2" (157 5 cm)  IBW: 50 1 kg  Body mass index is 26 01 kg/m²  Weight (last 2 days)     Date/Time   Weight    10/12/19 0600   64 5 (142 2)    10/11/19 0600   64 7 (142 64)    10/10/19 2020   64 7 (142 64)                Intake and Output  I/O       10/10 07 - 10/11 0700 10/11 0701 - 10/12 0700 10/12 0701 - 10/13 0700    P  O   270     I V  (mL/kg) 1270 2 (19 6) 410 4 (6 4)     IV Piggyback  50     Total Intake(mL/kg) 1270 2 (19 6) 730 4 (11 3)     Urine (mL/kg/hr) 200 (0 1)      Emesis/NG output       Total Output 200      Net +1070 2 +730 4            Unmeasured Urine Occurrence 2 x 6 x 1 x    Unmeasured Stool Occurrence 2 x 4 x           Nutrition       Diet Orders   (From admission, onward)             Start     Ordered    10/10/19 2020  Diet Regular; Regular House  Diet effective now     Question Answer Comment   Diet Type Regular    Regular Regular House    RD to adjust diet per protocol?  Yes        10/10/19 2019    10/10/19 1517  Dietary nutrition supplements  Once     Question Answer Comment   Select Supplement: Ensure Compact-Vanilla    Frequency Lunch        10/10/19 1517    10/10/19 1517  Dietary nutrition supplements  Once     Question Answer Comment   Select Supplement: Ensure Compact-Chocolate    Frequency Dinner        10/10/19 1517    10/09/19 1435  Room Service  Once     Question:  Type of Service  Answer:  Room Service - Appropriate with Assistance    10/09/19 1435                Laboratory and Diagnostics:  Results from last 7 days   Lab Units 10/12/19  0502 10/11/19  0539 10/10/19  0431 10/09/19  0951   WBC Thousand/uL 16 48* 16 19* 23 37* 17 27*   HEMOGLOBIN g/dL 9 5* 9 6* 10 4* 11 7   HEMATOCRIT % 30 2* 30 7* 34 3* 39 0   PLATELETS Thousands/uL 175 152 159 182   NEUTROS PCT %  --   --  88* 77*   MONOS PCT %  --   --  6 4     Results from last 7 days   Lab Units 10/12/19  0502 10/11/19  0539 10/10/19  0431 10/09/19  0951   SODIUM mmol/L 139 140 143 141   POTASSIUM mmol/L 4 5 3 7 4 3 3 8   CHLORIDE mmol/L 111* 112* 115* 108   CO2 mmol/L 18* 17* 17* 20*   ANION GAP mmol/L 10 11 11 13   BUN mg/dL 35* 23 22 25   CREATININE mg/dL 1 51* 1 13 1 07 1 31*   CALCIUM mg/dL 7 8* 7 7* 7 0* 8 8   GLUCOSE RANDOM mg/dL 121 142* 171* 189*   ALT U/L 122* 95* 114* 36   AST U/L 56* 55* 86* 33   ALK PHOS U/L 48 39* 41* 49   ALBUMIN g/dL 2 6* 2 7* 2 7* 3 6   TOTAL BILIRUBIN mg/dL 0 30 0 30 0 60 0 40     Results from last 7 days   Lab Units 10/12/19  0502 10/11/19  0539 10/10/19  0431   MAGNESIUM mg/dL 2 8* 2 7* 1 7   PHOSPHORUS mg/dL 3 4 2 8  --       Results from last 7 days   Lab Units 10/09/19  0951   INR  1 04   PTT seconds 22* Results from last 7 days   Lab Units 10/09/19  2010 10/09/19  1658 10/09/19  1356 10/09/19  0951   TROPONIN I ng/mL 0 45* 0 54* 0 47* 0 45*     Results from last 7 days   Lab Units 10/12/19  0502 10/10/19  2121 10/10/19  1245 10/10/19  0827 10/10/19  8914 10/10/19  0431 10/10/19  0156   LACTIC ACID mmol/L 1 3 1 8 4 7* 2 1* 2 4* 2 8* 2 7*     ABG:    VBG:  Results from last 7 days   Lab Units 10/10/19  2110   PH NATALY  7 270*   PCO2 NATALY mm Hg 33 5*   PO2 NATALY mm Hg 36 0   HCO3 NATALY mmol/L 15 0*   BASE EXC NATALY mmol/L -10 8     Results from last 7 days   Lab Units 10/11/19  0539 10/10/19  0642   PROCALCITONIN ng/ml 0 48* 0 23       Micro  Results from last 7 days   Lab Units 10/09/19  1832 10/09/19  1053 10/09/19  0951 10/09/19  0946   BLOOD CULTURE   --   --  No Growth at 48 hrs  No Growth at 48 hrs  URINE CULTURE   --  >100,000 cfu/ml Escherichia coli*  --   --    MRSA CULTURE ONLY  No Methicillin Resistant Staphlyococcus aureus (MRSA) isolated  --   --   --        EKG: NSR Rate 64  Imaging: I have personally reviewed pertinent reports  VAS lower limb venous duplex study, complete bilateral   Final Result      US right upper quadrant   Final Result      Limited study  Small hepatic cysts  Cholelithiasis without evidence of acute cholecystitis  Normal caliber common bile duct  Workstation performed: [id number]         XR chest portable   Final Result      No acute cardiopulmonary disease  Central line on the right  Workstation performed: [id number]         CTA chest pe study   Final Result      Large acute bilateral pulmonary emboli with new small bilateral pleural effusions and CT evidence of RIGHT HEART STRAIN  Small ascending thoracic aortic aneurysm measuring 4 1 cm  Hiatal hernia  Dependent atelectasis in the lung bases                     I personally discussed this study with [facility] on 10/10/2019 at 3:48 PM                      Workstation performed: [id number] CT chest abdomen pelvis wo contrast   Final Result      There are several indeterminate but potentially suspicious liver masses which might be metastatic disease  There also appear to be a couple liver cysts  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  There is a hiatal hernia  Right mastectomy  Indeterminate but probably cystic left renal lesion measures 2 9 cm  Mild colonic diverticulosis      The study was marked in EPIC for immediate notification  Workstation performed: [id number]         XR chest 1 view portable   Final Result      No acute cardiopulmonary disease              Workstation performed: [id number]             Active Medications  Scheduled Meds:  Current Facility-Administered Medications:  acetaminophen 650 mg Oral Q6H PRN Danyelle Mehta, PA-C    aspirin 81 mg Oral Daily Esperanzancalaina Mehta, PA-C    brimonidine-timolol 1 drop Both Eyes Daily SOLO Goncalves-OSKAR    cefTRIAXone 1,000 mg Intravenous Q24H Danyelle Mehta, PA-OSKAR Last Rate: Stopped (10/12/19 0537)   donepezil 5 mg Oral HS Danyelle Mehta, PA-C    enoxaparin 60 mg Subcutaneous Q24H [address] Tara Braden MD    epinephrine 1-10 mcg/min Intravenous Titrated Darius Moreno MD Last Rate: Stopped (10/12/19 0441)   escitalopram 5 mg Oral Daily Danyelle Mehta, PA-C    memantine 5 mg Oral Daily Danyelle Mehta, PA-C    multivitamin-minerals 1 tablet Oral Daily SOLO Goncalves-OSKAR    ondansetron 4 mg Intravenous Q6H PRN Danyelle Mehta, PA-C    pneumococcal 13-valent conjugate vaccine 0 5 mL Intramuscular Prior to discharge Danyelle Mehta, PA-OSKAR    tamoxifen 20 mg Oral Daily Danyelle Mehta, PA-OSKAR      Continuous Infusions:    epinephrine 1-10 mcg/min Last Rate: Stopped (10/12/19 0441)     PRN Meds:     acetaminophen 650 mg Q6H PRN   ondansetron 4 mg Q6H PRN   pneumococcal 13-valent conjugate vaccine 0 5 mL Prior to discharge ---------------------------------------------------------------------------------------  Advance Directive and Living Will: Yes    Power of : Yes  POLST:    ---------------------------------------------------------------------------------------  Counseling / Coordination of Care  Total Critical Care time spent 35 minutes excluding procedures, teaching and family updates  RUTH Cruz        Portions of the record may have been created with voice recognition software  Occasional wrong word or "sound a like" substitutions may have occurred due to the inherent limitations of voice recognition software    Read the chart carefully and recognize, using context, where substitutions have occurred

## 2019-10-12 NOTE — PROGRESS NOTES
ICU Progress Note - Cardiology   Anjali Yanes 80 y o  female MRN: [id number]  Unit/Bed#: ICU 11 Encounter: [id number]    Assessment/Plan:  Bilateral pulmonary embolism/elevated pulmonary pressures/RV dysfunction- hemodynamically stable on inotrope  She has been on weight based Lovenox  Goals of care discussion with family being held  Family has decided on palliative hospice and would like more info  Type 2 myocardial infarction in the setting of bilateral pulmonary embolism- echo 2D without focal wall motion abnormalities    Dementia with behavioral disturbances currently on Namenda plus Aricept    Breast cancer with metastasis    Generalized weakness    Subjective/Objective   No significant telemetry events  Up to 4L oxygen    Objective:     Vitals: BP 90/57   Pulse 87   Temp 97 8 °F (36 6 °C) (Temporal)   Resp 16   Ht 5' 2" (1 575 m)   Wt 64 5 kg (142 lb 3 2 oz)   SpO2 96%   Breastfeeding? No   BMI 26 01 kg/m²   Vitals:    10/11/19 0600 10/12/19 0600   Weight: 64 7 kg (142 lb 10 2 oz) 64 5 kg (142 lb 3 2 oz)     Orthostatic Blood Pressures      Most Recent Value   Blood Pressure  90/57 filed at 10/12/2019 0800   Patient Position - Orthostatic VS  Lying filed at 10/12/2019 0000            Intake/Output Summary (Last 24 hours) at 10/12/2019 1026  Last data filed at 10/12/2019 0601  Gross per 24 hour   Intake 709 ml   Output    Net 709 ml       Invasive Devices     Central Venous Catheter Line            CVC Central Lines 10/10/19 Triple Right Internal jugular 2 days          Peripheral Intravenous Line            Peripheral IV 10/09/19 Left Forearm 2 days    Peripheral IV 10/10/19 Left;Upper Arm 2 days    Peripheral IV 10/10/19 Left;Proximal;Ventral (anterior) Forearm 1 day                Review of Systems: reviewed    Physical Exam   Constitutional: She has a sickly appearance  She appears ill  No distress  HENT:   Head: Normocephalic and atraumatic     Right Ear: External ear normal    Left Ear: External ear normal    Nose: Nose normal    Mouth/Throat: No oropharyngeal exudate  Eyes: Pupils are equal, round, and reactive to light  Conjunctivae are normal  Right eye exhibits no discharge  Left eye exhibits no discharge  No scleral icterus  Neck: Normal range of motion  JVD present  No tracheal deviation present  No thyromegaly present  Cardiovascular: Normal rate, regular rhythm and intact distal pulses  Exam reveals gallop  Exam reveals no friction rub  Murmur heard  Pulmonary/Chest: Effort normal  No stridor  No respiratory distress  She has no wheezes  She has no rales  She exhibits no tenderness  Abdominal: Soft  Bowel sounds are normal  She exhibits distension  She exhibits no mass  There is no tenderness  There is no rebound and no guarding  Genitourinary:   Genitourinary Comments: No CVA tenderness   Musculoskeletal: She exhibits deformity  She exhibits no edema or tenderness  Neurological: She is alert  She displays normal reflexes  She exhibits normal muscle tone  Skin: Skin is warm and dry  No rash noted  She is not diaphoretic  No erythema  Psychiatric: She has a normal mood and affect  Her behavior is normal  Judgment and thought content normal    Nursing note and vitals reviewed      Lab Results:   CBC with diff:   Results from last 7 days   Lab Units 10/12/19  0502   WBC Thousand/uL 16 48*   RBC Million/uL 3 10*   HEMOGLOBIN g/dL 9 5*   HEMATOCRIT % 30 2*   MCV fL 97   MCH pg 30 6   MCHC g/dL 31 5   RDW % 15 9*   MPV fL 11 1   PLATELETS Thousands/uL 175     CMP:   Results from last 7 days   Lab Units 10/12/19  0502   SODIUM mmol/L 139   POTASSIUM mmol/L 4 5   CHLORIDE mmol/L 111*   CO2 mmol/L 18*   BUN mg/dL 35*   CREATININE mg/dL 1 51*   CALCIUM mg/dL 7 8*   AST U/L 56*   ALT U/L 122*   ALK PHOS U/L 48   EGFR ml/min/1 73sq m 31     Troponin:   0   Lab Value Date/Time    TROPONINI 0 45 (H) 10/09/2019 2010    TROPONINI 0 54 (H) 10/09/2019 1658    TROPONINI 0 47 (H) 10/09/2019 1356    TROPONINI 0 45 (H) 10/09/2019 0951    TROPONINI <0 02 01/11/2018 1540     BNP:   Results from last 7 days   Lab Units 10/12/19  0502   POTASSIUM mmol/L 4 5   CHLORIDE mmol/L 111*   CO2 mmol/L 18*   BUN mg/dL 35*   CREATININE mg/dL 1 51*   CALCIUM mg/dL 7 8*   EGFR ml/min/1 73sq m 31     Coags:   Results from last 7 days   Lab Units 10/09/19  0951   PTT seconds 22*   INR  1 04     Imaging: I have personally reviewed pertinent reports  EKG: reviewed   VTE Pharmacologic Prophylaxis: Enoxaparin (Lovenox)    Counseling / Coordination of Care  Total time spent today 40 minutes  Greater than 50% of total time was spent with the patient and / or family counseling and / or coordination of care   A description of the counseling / coordination of care: bilateral pe/rv strain

## 2019-10-12 NOTE — PROGRESS NOTES
Patient is lying in the bed alert awake response to simple questions but she confabulates with the questions she was able to tell me that she is in''some kind of hospital''but could not tell me why she is here what's wrong with her and when she came here she responded by saying that''I do not know''she could not tell me her age date of birth she is not aware of her problems and limitations     Patient is suffering from dementia depression and forgetfulness  Patient is not lethargic poor eating she has no appetite  Patient is weak no energy she needs help with ADL care  No side effects of Lexapro Namenda and Aricept noted  Nurse reported no behavior problems  No psychosis no hallucinations  Medical treatment is in progress  I will continue her current medications the same at this time  I will follow up

## 2019-10-12 NOTE — ASSESSMENT & PLAN NOTE
· After multiple conversations regarding goals of care, family decided to place patient on comfort measures only with no escalation of care

## 2019-10-12 NOTE — ACP (ADVANCE CARE PLANNING)
Ongoing discussion with pt's son and Thexavi Dark  Family is waiting to discussion disposition options with case management and hospice/palliative care  At this time, family is agreeable to comfort care measures  They do not wish to escalate care at this time

## 2019-10-13 PROCEDURE — 99232 SBSQ HOSP IP/OBS MODERATE 35: CPT | Performed by: FAMILY MEDICINE

## 2019-10-13 RX ADMIN — APIXABAN 10 MG: 5 TABLET, FILM COATED ORAL at 17:16

## 2019-10-13 RX ADMIN — APIXABAN 10 MG: 5 TABLET, FILM COATED ORAL at 09:07

## 2019-10-13 NOTE — PLAN OF CARE
Problem: DISCHARGE PLANNING  Goal: Discharge to home or other facility with appropriate resources  Description  INTERVENTIONS:  - Identify barriers to discharge w/patient and caregiver  - Arrange for needed discharge resources and transportation as appropriate  - Identify discharge learning needs (meds, wound care, etc )  - Arrange for interpretive services to assist at discharge as needed  - Refer to Case Management Department for coordinating discharge planning if the patient needs post-hospital services based on physician/advanced practitioner order or complex needs related to functional status, cognitive ability, or social support system  Outcome: Progressing     Problem: Potential for Falls  Goal: Patient will remain free of falls  Description  INTERVENTIONS:  - Assess patient frequently for physical needs  -  Identify cognitive and physical deficits and behaviors that affect risk of falls  -  [place] fall precautions as indicated by assessment   - Educate patient/family on patient safety including physical limitations  - Instruct patient to call for assistance with activity based on assessment  - Modify environment to reduce risk of injury  - Consider OT/PT consult to assist with strengthening/mobility  Outcome: Progressing     Problem: Nutrition/Hydration-ADULT  Goal: Nutrient/Hydration intake appropriate for improving, restoring or maintaining nutritional needs  Description  Monitor and assess patient's nutrition/hydration status for malnutrition  Collaborate with interdisciplinary team and initiate plan and interventions as ordered  Monitor patient's weight and dietary intake as ordered or per policy  Utilize nutrition screening tool and intervene as necessary  Determine patient's food preferences and provide high-protein, high-caloric foods as appropriate       INTERVENTIONS:  - Monitor oral intake, urinary output, labs, and treatment plans  - Assess nutrition and hydration status and recommend course of action  - Evaluate amount of meals eaten  - Assist patient with eating if necessary   - Allow adequate time for meals  - Recommend/ encourage appropriate diets, oral nutritional supplements, and vitamin/mineral supplements  - Order, calculate, and assess calorie counts as needed  - Assess need for intravenous fluids  - Provide specific nutrition/hydration education as appropriate  - Include patient/family/caregiver in decisions related to nutrition   Outcome: Progressing     Problem: Prexisting or High Potential for Compromised Skin Integrity  Goal: Skin integrity is maintained or improved  Description  INTERVENTIONS:  - Identify patients at risk for skin breakdown  - Assess and monitor skin integrity  - Assess and monitor nutrition and hydration status  - Monitor labs   - Assess for incontinence   - Turn and reposition patient  - Assist with mobility/ambulation  - Relieve pressure over bony prominences  - Avoid friction and shearing  - Provide appropriate hygiene as needed including keeping skin clean and dry  - Evaluate need for skin moisturizer/barrier cream  - Collaborate with interdisciplinary team   - Patient/family teaching  - Consider wound care consult   Outcome: Progressing     Problem: PAIN - ADULT  Goal: Verbalizes/displays adequate comfort level or baseline comfort level  Description  Interventions:  - Encourage patient to monitor pain and request assistance  - Assess pain using appropriate pain scale  - Administer analgesics based on type and severity of pain and evaluate response  - Implement non-pharmacological measures as appropriate and evaluate response  - Consider cultural and social influences on pain and pain management  - Notify physician/advanced practitioner if interventions unsuccessful or patient reports new pain  Outcome: Progressing     Problem: INFECTION - ADULT  Goal: Absence or prevention of progression during hospitalization  Description  INTERVENTIONS:  - Assess and monitor for signs and symptoms of infection  - Monitor lab/diagnostic results  - Monitor all insertion sites, i e  indwelling lines, tubes, and drains  - Monitor endotracheal if appropriate and nasal secretions for changes in amount and color  - [place] appropriate cooling/warming therapies per order  - Administer medications as ordered  - Instruct and encourage patient and family to use good hand hygiene technique  - Identify and instruct in appropriate isolation precautions for identified infection/condition  Outcome: Progressing  Goal: Absence of fever/infection during neutropenic period  Description  INTERVENTIONS:  - Monitor WBC    Outcome: Progressing     Problem: SAFETY ADULT  Goal: Maintain or return to baseline ADL function  Description  INTERVENTIONS:  -  Assess patient's ability to carry out ADLs; assess patient's baseline for ADL function and identify physical deficits which impact ability to perform ADLs (bathing, care of mouth/teeth, toileting, grooming, dressing, etc )  - Assess/evaluate cause of self-care deficits   - Assess range of motion  - Assess patient's mobility; develop plan if impaired  - Assess patient's need for assistive devices and provide as appropriate  - Encourage maximum independence but intervene and supervise when necessary  - Involve family in performance of ADLs  - Assess for home care needs following discharge   - Consider OT consult to assist with ADL evaluation and planning for discharge  - Provide patient education as appropriate  Outcome: Progressing  Goal: Maintain or return mobility status to optimal level  Description  INTERVENTIONS:  - Assess patient's baseline mobility status (ambulation, transfers, stairs, etc )    - Identify cognitive and physical deficits and behaviors that affect mobility  - Identify mobility aids required to assist with transfers and/or ambulation (gait belt, sit-to-stand, lift, walker, cane, etc )  - [place] fall precautions as indicated by assessment  - Record patient progress and toleration of activity level on Mobility SBAR; progress patient to next Phase/Stage  - Instruct patient to call for assistance with activity based on assessment  - Consider rehabilitation consult to assist with strengthening/weightbearing, etc   Outcome: Progressing     Problem: DISCHARGE PLANNING  Goal: Discharge to home or other facility with appropriate resources  Description  INTERVENTIONS:  - Identify barriers to discharge w/patient and caregiver  - Arrange for needed discharge resources and transportation as appropriate  - Identify discharge learning needs (meds, wound care, etc )  - Arrange for interpretive services to assist at discharge as needed  - Refer to Case Management Department for coordinating discharge planning if the patient needs post-hospital services based on physician/advanced practitioner order or complex needs related to functional status, cognitive ability, or social support system  Outcome: Progressing     Problem: Knowledge Deficit  Goal: Patient/family/caregiver demonstrates understanding of disease process, treatment plan, medications, and discharge instructions  Description  Complete learning assessment and assess knowledge base    Interventions:  - Provide teaching at level of understanding  - Provide teaching via preferred learning methods  Outcome: Progressing     Problem: CARDIOVASCULAR - ADULT  Goal: Maintains optimal cardiac output and hemodynamic stability  Description  INTERVENTIONS:  - Monitor I/O, vital signs and rhythm  - Monitor for S/S and trends of decreased cardiac output  - Administer and titrate ordered vasoactive medications to optimize hemodynamic stability  - Assess quality of pulses, skin color and temperature  - Assess for signs of decreased coronary artery perfusion  - Instruct patient to report change in severity of symptoms  Outcome: Progressing  Goal: Absence of cardiac dysrhythmias or at baseline rhythm  Description  INTERVENTIONS:  - Continuous cardiac monitoring, vital signs, obtain 12 lead EKG if ordered  - Administer antiarrhythmic and heart rate control medications as ordered  - Monitor electrolytes and administer replacement therapy as ordered  Outcome: Progressing     Problem: GENITOURINARY - ADULT  Goal: Maintains or returns to baseline urinary function  Description  INTERVENTIONS:  - Assess urinary function  - Encourage oral fluids to ensure adequate hydration if ordered  - Administer IV fluids as ordered to ensure adequate hydration  - Administer ordered medications as needed  - Offer frequent toileting  - Follow urinary retention protocol if ordered  Outcome: Progressing  Goal: Absence of urinary retention  Description  INTERVENTIONS:  - Assess patients ability to void and empty bladder  - Monitor I/O  - Bladder scan as needed  - Discuss with physician/AP medications to alleviate retention as needed  - Discuss catheterization for long term situations as appropriate  Outcome: Progressing  Goal: Urinary catheter remains patent  Description  INTERVENTIONS:  - Assess patency of urinary catheter  - If patient has a chronic diaz, consider changing catheter if non-functioning  - Follow guidelines for intermittent irrigation of non-functioning urinary catheter  Outcome: Progressing

## 2019-10-13 NOTE — PROGRESS NOTES
Patient examined spoke to the daughter and son in low critical care nurse practice nurse  Clif's notes reviewed and noted  Family decided to place patient on comfort care  Patient remain demented confused confabulates with the questions she could not tell me where she is why she is here could not tell me her age and date of birth current year current month could not recognize her daughter she confabulates with many questions  Ongoing medical problems  She is not agitated not suicidal she could not tell me why she came here when she came here and what happened to her  Patient is not agitated no hallucinations  Patient is not suicidal   Patient needs help with ADL care  Currently patient's psych meds are discontinued id noted  No behavior problem reported or noted  I will continue same treatment at this time

## 2019-10-13 NOTE — PROGRESS NOTES
[facility] Progress Note  Patient: Lisbeth Haddad 80 y o  female   MRN: [id number]  PCP: Jose A Kumar MD  Unit/Bed#: [address] [place] [address] Encounter: [id number]  Date Of Visit: 10/13/19    Problem List:    Active Problems:    Pulmonary embolism (HCC)    Comfort measures only status    Type 2 myocardial infarction [facility])    Acute cystitis    Essential hypertension    Malignant neoplasm of overlapping sites of right breast in female, estrogen receptor positive ([facility] [address] )    Liver mass    Weakness generalized    Moderate dementia with behavioral disturbance ([facility] [address] )      Assessment & Plan:    * Severe sepsis (HCC)resolved as of 10/12/2019  Assessment & Plan  As noted by leukocytosis, tachycardia, lactic acidosis with lactic acid of 4 1 with source of infection being acute cystitis  Patient received 30 mL/kg fluid bolus in the ER  Patient had lactic acidosis despite multiple fluid boluses which is most likely from her liver dysfunction/lesions  Lactic acid normalized  blood cultures negative so far, urine culture growing greater than 100,000 E coli  CT chest, abdomen/pelvis was negative for any infectious etiology  Due to O2 episodes of hypotension, patient was transferred to the ICU and was started on epinephrine which has since been discontinued      Comfort measures only status  Assessment & Plan  After multiple conversations with family, patient has been placed on comfort measures only  Hospice consult placed  Continue morphine, Robinul p r n  Pulmonary embolism [facility])  Assessment & Plan  Patient noted to have large acute bilateral PE on CTA chest with right heart strain along with small bilateral pleural effusions  Patient was unstable with acute PE    Blood pressures were been soft/hypotensive despite multiple fluid boluses previously and patient was transferred to the ICU  Patient was started on weight based lovenox and then transitioned to Eliquis   Venous Doppler showed acute occlusive DVT in popliteal vein, posterior tibial veins and gastrocnemius veins of right lower extremity    Acute cystitis  Assessment & Plan  Patient received a dose of IV cefepime in the ER  Prior culture has grown E coli which was pansensitive  Patient was started on on IV Rocephin and later switched to cefepime and vancomycin   Vancomycin was discontinued and was then switched to Rocephin  Now off antibiotics  urine culture growing > 100,000 E coli        Type 2 myocardial infarction [facility])  Assessment & Plan  Patient's initial troponin noted to be 0 45 with new EKG changes in the setting of severe sepsis, cystitis, acute PE  Troponin peaked at 0 54  Patient received a full dose of aspirin now off baby aspirin  Lopressor was previously discontinued as patient noted to be hypotensive  echo showed normal EF with grade 1 diastolic dysfunction  Moderate pulmonary hypertension was noted along with increased RV pressures      Essential hypertension  Assessment & Plan  Lopressor and lisinopril was discontinued as patient noted to be hypotensive    Moderate dementia with behavioral disturbance ([facility] [address] )  Assessment & Plan  Now off Namenda, Aricept, Lexapro  She was seen by Psychiatry     Weakness generalized  Assessment & Plan  Most likely due to severe sepsis, cystitis, acute PE, deconditioning    Liver mass  Assessment & Plan  On CT scan, patient noted to have several liver masses which could be metastatic disease  Few liver cysts were also noted  Oncology was consulted  Patient with elevated liver enzymes on prior lab work  Right upper quadrant ultrasound showed hepatic cyst, cholelithiasis    Malignant neoplasm of overlapping sites of right breast in female, estrogen receptor positive [facility])  Assessment & Plan  Patient is status post right mastectomy    She was on tamoxifen which has been discontinued    Shock (HCC)resolved as of 10/12/2019  Assessment & Plan  Likely due to bilateral PE causing right heart strain, sepsis  She was previously on epinephrine    Diarrhearesolved as of 10/10/2019  Assessment & Plan  As per daughter, patient with diarrhea since day prior to admission  Resolved        VTE Pharmacologic Prophylaxis:   Pharmacologic: Apixaban (Eliquis)  Mechanical VTE Prophylaxis in Place: No    Patient Centered Rounds: I have performed bedside rounds with nursing staff today  Discussions with Specialists or Other Care Team Provider: yes    Education and Discussions with Family / Patient: yes - daughter, son    Time Spent for Care: 30 minutes  More than 50% of total time spent on counseling and coordination of care as described above  Current Length of Stay: 4 day(s)    Current Patient Status: Inpatient   Certification Statement: The patient will continue to require additional inpatient hospital stay due to acute PE, comfort care measures    Discharge Plan: Pending    Code Status: Level 4 - Comfort Care      Subjective:   Patient states she must not be doing well as she is still in the hospital    Objective:     Vitals:   Temp (24hrs), Av 8 °F (36 6 °C), Min:97 4 °F (36 3 °C), Max:98 2 °F (36 8 °C)    Temp:  [97 4 °F (36 3 °C)-98 2 °F (36 8 °C)] 98 2 °F (36 8 °C)  HR:  [82-94] 86  Resp:  [18] 18  BP: (142-159)/(86-99) 159/99  SpO2:  [95 %-97 %] 96 %  Body mass index is 26 01 kg/m²  Input and Output Summary (last 24 hours):     No intake or output data in the 24 hours ending 10/13/19 2103    Physical Exam:     Physical Exam   Constitutional:   Conversational dyspnea noted although patient able to speak in full sentences   HENT:   Head: Normocephalic and atraumatic  Eyes: EOM are normal  Right eye exhibits no discharge  Left eye exhibits no discharge  Cardiovascular: Normal rate and regular rhythm  Murmur heard  Pulmonary/Chest: Effort normal  No respiratory distress  She has no wheezes  She has no rales  Decreased breath sounds bilaterally   Abdominal: Soft   Bowel sounds are normal  She exhibits no distension  There is no tenderness  Neurological: She is alert  No cranial nerve deficit  Oriented to self and place  Confused and forgetful   Skin: She is not diaphoretic  Additional Data:     Labs:    Results from last 7 days   Lab Units 10/12/19  0502  10/10/19  0431   WBC Thousand/uL 16 48*   < > 23 37*   HEMOGLOBIN g/dL 9 5*   < > 10 4*   HEMATOCRIT % 30 2*   < > 34 3*   PLATELETS Thousands/uL 175   < > 159   NEUTROS PCT %  --   --  88*   LYMPHS PCT %  --   --  5*   MONOS PCT %  --   --  6   EOS PCT %  --   --  0    < > = values in this interval not displayed  Results from last 7 days   Lab Units 10/12/19  0502   POTASSIUM mmol/L 4 5   CHLORIDE mmol/L 111*   CO2 mmol/L 18*   BUN mg/dL 35*   CREATININE mg/dL 1 51*   CALCIUM mg/dL 7 8*   ALK PHOS U/L 48   ALT U/L 122*   AST U/L 56*     Results from last 7 days   Lab Units 10/09/19  0951   INR  1 04       * I Have Reviewed All Lab Data Listed Above  * Additional Pertinent Lab Tests Reviewed: [facility] Admission Reviewed      Imaging:  Imaging Reports Reviewed Today Include: Vas doppler  Imaging Personally Reviewed by Myself Includes:  N/A    Recent Cultures (last 7 days):     Results from last 7 days   Lab Units 10/09/19  1053 10/09/19  0951 10/09/19  0946   BLOOD CULTURE   --  No Growth After 4 Days  No Growth After 4 Days     URINE CULTURE  >100,000 cfu/ml Escherichia coli*  --   --        Last 24 Hours Medication List:     Current Facility-Administered Medications:  acetaminophen 650 mg Oral Q6H PRN RUTH Rodgers   apixaban 10 mg Oral BID RUTH Jacques   [START ON 10/18/2019] apixaban 5 mg Oral BID RUTH Acosta   brimonidine-timolol 1 drop Both Eyes Daily RUTH Acosta   glycopyrrolate 0 2 mg Intravenous Q1H PRN RUTH Rodgers   morphine injection 2 mg Intravenous Q1H PRN RUTH Acosta   ondansetron 4 mg Intravenous Q6H PRFLORENTINO Cleaning Linden Johnson          Today, Patient Was Seen By: Sedrick Starr DO    ** Please Note: "This note has been constructed using a voice recognition system  Therefore there may be syntax, spelling, and/or grammatical errors   Please call if you have any questions  "**

## 2019-10-14 LAB
BACTERIA BLD CULT: NORMAL
BACTERIA BLD CULT: NORMAL

## 2019-10-14 PROCEDURE — 97110 THERAPEUTIC EXERCISES: CPT

## 2019-10-14 PROCEDURE — 99232 SBSQ HOSP IP/OBS MODERATE 35: CPT | Performed by: INTERNAL MEDICINE

## 2019-10-14 PROCEDURE — 99232 SBSQ HOSP IP/OBS MODERATE 35: CPT | Performed by: FAMILY MEDICINE

## 2019-10-14 RX ORDER — ESCITALOPRAM OXALATE 5 MG/1
5 TABLET ORAL DAILY
Status: DISCONTINUED | OUTPATIENT
Start: 2019-10-15 | End: 2019-10-15 | Stop reason: HOSPADM

## 2019-10-14 RX ORDER — MEMANTINE HYDROCHLORIDE 7 MG/1
7 CAPSULE, EXTENDED RELEASE ORAL EVERY MORNING
Status: DISCONTINUED | OUTPATIENT
Start: 2019-10-15 | End: 2019-10-15 | Stop reason: HOSPADM

## 2019-10-14 RX ORDER — ASPIRIN 81 MG/1
81 TABLET, CHEWABLE ORAL EVERY MORNING
Status: DISCONTINUED | OUTPATIENT
Start: 2019-10-15 | End: 2019-10-15 | Stop reason: HOSPADM

## 2019-10-14 RX ORDER — DONEPEZIL HYDROCHLORIDE 5 MG/1
5 TABLET, FILM COATED ORAL
Status: DISCONTINUED | OUTPATIENT
Start: 2019-10-14 | End: 2019-10-15 | Stop reason: HOSPADM

## 2019-10-14 RX ORDER — TAMOXIFEN CITRATE 10 MG/1
20 TABLET ORAL DAILY
Status: DISCONTINUED | OUTPATIENT
Start: 2019-10-15 | End: 2019-10-14

## 2019-10-14 RX ORDER — LISINOPRIL 20 MG/1
20 TABLET ORAL EVERY MORNING
Status: DISCONTINUED | OUTPATIENT
Start: 2019-10-15 | End: 2019-10-15 | Stop reason: HOSPADM

## 2019-10-14 RX ORDER — MEMANTINE HYDROCHLORIDE 10 MG/1
10 TABLET ORAL DAILY
Status: DISCONTINUED | OUTPATIENT
Start: 2019-10-15 | End: 2019-10-14 | Stop reason: SDUPTHER

## 2019-10-14 RX ADMIN — APIXABAN 10 MG: 5 TABLET, FILM COATED ORAL at 17:00

## 2019-10-14 RX ADMIN — DONEPEZIL HYDROCHLORIDE 5 MG: 5 TABLET ORAL at 21:58

## 2019-10-14 RX ADMIN — APIXABAN 10 MG: 5 TABLET, FILM COATED ORAL at 08:19

## 2019-10-14 NOTE — SOCIAL WORK
Pt's family met with [facility] hospice liaison and at this time will not be moving forward with hospice as pt seems to be doing better today than previous days  Met with pt's daughter and son to discuss STR recommendations  They requested a referral to be placed to [facility]  Referral placed  Advised that additional referrals will also be needed in case their 1st choice has no bed availability  Will continue to follow-up

## 2019-10-14 NOTE — ASSESSMENT & PLAN NOTE
After multiple conversations with family previously, patient was placed on comfort measures only  Patient's family met with Hospice representative on October 14, 2019  Later in the day based on discussion patient's family members present at bedside by Ye Sanches ,they do not want comfort measures only  Patient's family is not interested in hospice  They would like for her to be placed back on all her medications  They would like patient to get stronger    Patient's morphine and for Robinul were discontinued

## 2019-10-14 NOTE — PROGRESS NOTES
Patient examined 's note reviewed and noted  Patient remain demented flat withdrawn depressed forgetful confabulates with the questions she is not fully aware of her medical problems  She is placed on comfort care  Her psychotropic medications were discontinued  Patient is not agitated not suicidal not lethargic nurse reported no behavior problem at this time  Since patient is on comfort care and she is no longer on psychotropic medications  She is not suicidal no evidence of hallucinations no psychosis she is not in distress at this time  I will sign off because there is no added benefit of psychiatric follow-up at this point  Medical progress note noted  Thank you very much

## 2019-10-14 NOTE — PLAN OF CARE
Problem: Potential for Falls  Goal: Patient will remain free of falls  Description  INTERVENTIONS:  - Assess patient frequently for physical needs  -  Identify cognitive and physical deficits and behaviors that affect risk of falls  -  [place] fall precautions as indicated by assessment   - Educate patient/family on patient safety including physical limitations  - Instruct patient to call for assistance with activity based on assessment  - Modify environment to reduce risk of injury  - Consider OT/PT consult to assist with strengthening/mobility  Outcome: Progressing     Problem: Nutrition/Hydration-ADULT  Goal: Nutrient/Hydration intake appropriate for improving, restoring or maintaining nutritional needs  Description  Monitor and assess patient's nutrition/hydration status for malnutrition  Collaborate with interdisciplinary team and initiate plan and interventions as ordered  Monitor patient's weight and dietary intake as ordered or per policy  Utilize nutrition screening tool and intervene as necessary  Determine patient's food preferences and provide high-protein, high-caloric foods as appropriate       INTERVENTIONS:  - Monitor oral intake, urinary output, labs, and treatment plans  - Assess nutrition and hydration status and recommend course of action  - Evaluate amount of meals eaten  - Assist patient with eating if necessary   - Allow adequate time for meals  - Recommend/ encourage appropriate diets, oral nutritional supplements, and vitamin/mineral supplements  - Order, calculate, and assess calorie counts as needed  - Assess need for intravenous fluids  - Provide specific nutrition/hydration education as appropriate  - Include patient/family/caregiver in decisions related to nutrition   Outcome: Progressing     Problem: Prexisting or High Potential for Compromised Skin Integrity  Goal: Skin integrity is maintained or improved  Description  INTERVENTIONS:  - Identify patients at risk for skin breakdown  - Assess and monitor skin integrity  - Assess and monitor nutrition and hydration status  - Monitor labs   - Assess for incontinence   - Turn and reposition patient  - Assist with mobility/ambulation  - Relieve pressure over bony prominences  - Avoid friction and shearing  - Provide appropriate hygiene as needed including keeping skin clean and dry  - Evaluate need for skin moisturizer/barrier cream  - Collaborate with interdisciplinary team   - Patient/family teaching  - Consider wound care consult   Outcome: Progressing     Problem: PAIN - ADULT  Goal: Verbalizes/displays adequate comfort level or baseline comfort level  Description  Interventions:  - Encourage patient to monitor pain and request assistance  - Assess pain using appropriate pain scale  - Administer analgesics based on type and severity of pain and evaluate response  - Implement non-pharmacological measures as appropriate and evaluate response  - Consider cultural and social influences on pain and pain management  - Notify physician/advanced practitioner if interventions unsuccessful or patient reports new pain  Outcome: Progressing     Problem: INFECTION - ADULT  Goal: Absence or prevention of progression during hospitalization  Description  INTERVENTIONS:  - Assess and monitor for signs and symptoms of infection  - Monitor lab/diagnostic results  - Monitor all insertion sites, i e  indwelling lines, tubes, and drains  - Monitor endotracheal if appropriate and nasal secretions for changes in amount and color  - [place] appropriate cooling/warming therapies per order  - Administer medications as ordered  - Instruct and encourage patient and family to use good hand hygiene technique  - Identify and instruct in appropriate isolation precautions for identified infection/condition  Outcome: Progressing  Goal: Absence of fever/infection during neutropenic period  Description  INTERVENTIONS:  - Monitor WBC    Outcome: Progressing     Problem: SAFETY ADULT  Goal: Maintain or return to baseline ADL function  Description  INTERVENTIONS:  -  Assess patient's ability to carry out ADLs; assess patient's baseline for ADL function and identify physical deficits which impact ability to perform ADLs (bathing, care of mouth/teeth, toileting, grooming, dressing, etc )  - Assess/evaluate cause of self-care deficits   - Assess range of motion  - Assess patient's mobility; develop plan if impaired  - Assess patient's need for assistive devices and provide as appropriate  - Encourage maximum independence but intervene and supervise when necessary  - Involve family in performance of ADLs  - Assess for home care needs following discharge   - Consider OT consult to assist with ADL evaluation and planning for discharge  - Provide patient education as appropriate  Outcome: Progressing  Goal: Maintain or return mobility status to optimal level  Description  INTERVENTIONS:  - Assess patient's baseline mobility status (ambulation, transfers, stairs, etc )    - Identify cognitive and physical deficits and behaviors that affect mobility  - Identify mobility aids required to assist with transfers and/or ambulation (gait belt, sit-to-stand, lift, walker, cane, etc )  - [place] fall precautions as indicated by assessment  - Record patient progress and toleration of activity level on Mobility SBAR; progress patient to next Phase/Stage  - Instruct patient to call for assistance with activity based on assessment  - Consider rehabilitation consult to assist with strengthening/weightbearing, etc   Outcome: Progressing     Problem: DISCHARGE PLANNING  Goal: Discharge to home or other facility with appropriate resources  Description  INTERVENTIONS:  - Identify barriers to discharge w/patient and caregiver  - Arrange for needed discharge resources and transportation as appropriate  - Identify discharge learning needs (meds, wound care, etc )  - Arrange for interpretive services to assist at discharge as needed  - Refer to Case Management Department for coordinating discharge planning if the patient needs post-hospital services based on physician/advanced practitioner order or complex needs related to functional status, cognitive ability, or social support system  Outcome: Progressing     Problem: Knowledge Deficit  Goal: Patient/family/caregiver demonstrates understanding of disease process, treatment plan, medications, and discharge instructions  Description  Complete learning assessment and assess knowledge base    Interventions:  - Provide teaching at level of understanding  - Provide teaching via preferred learning methods  Outcome: Progressing     Problem: CARDIOVASCULAR - ADULT  Goal: Maintains optimal cardiac output and hemodynamic stability  Description  INTERVENTIONS:  - Monitor I/O, vital signs and rhythm  - Monitor for S/S and trends of decreased cardiac output  - Administer and titrate ordered vasoactive medications to optimize hemodynamic stability  - Assess quality of pulses, skin color and temperature  - Assess for signs of decreased coronary artery perfusion  - Instruct patient to report change in severity of symptoms  Outcome: Progressing  Goal: Absence of cardiac dysrhythmias or at baseline rhythm  Description  INTERVENTIONS:  - Continuous cardiac monitoring, vital signs, obtain 12 lead EKG if ordered  - Administer antiarrhythmic and heart rate control medications as ordered  - Monitor electrolytes and administer replacement therapy as ordered  Outcome: Progressing     Problem: GENITOURINARY - ADULT  Goal: Maintains or returns to baseline urinary function  Description  INTERVENTIONS:  - Assess urinary function  - Encourage oral fluids to ensure adequate hydration if ordered  - Administer IV fluids as ordered to ensure adequate hydration  - Administer ordered medications as needed  - Offer frequent toileting  - Follow urinary retention protocol if ordered  Outcome: Progressing  Goal: Absence of urinary retention  Description  INTERVENTIONS:  - Assess patients ability to void and empty bladder  - Monitor I/O  - Bladder scan as needed  - Discuss with physician/AP medications to alleviate retention as needed  - Discuss catheterization for long term situations as appropriate  Outcome: Progressing  Goal: Urinary catheter remains patent  Description  INTERVENTIONS:  - Assess patency of urinary catheter  - If patient has a chronic diaz, consider changing catheter if non-functioning  - Follow guidelines for intermittent irrigation of non-functioning urinary catheter  Outcome: Progressing

## 2019-10-14 NOTE — PHYSICAL THERAPY NOTE
PT TREATMENT     10/14/19 1625   Pain Assessment   Pain Assessment No/denies pain   Restrictions/Precautions   Other Precautions Chair Alarm; Bed Alarm; Fall Risk;Pain   General   Chart Reviewed Yes   Family/Caregiver Present Yes   Cognition   Arousal/Participation Cooperative   Subjective   Subjective patient without pain complaints at this time   Bed Mobility   Supine to Sit 4  Minimal assistance   Additional items Assist x 1   Sit to Supine 3  Moderate assistance   Additional items Assist x 1;Verbal cues;LE management   Transfers   Sit to Stand 3  Moderate assistance   Additional items Assist x 1;Verbal cues   Stand to Sit 4  Minimal assistance   Additional items Assist x 1;Verbal cues   Ambulation/Elevation   Gait Assistance   (min to mod assist)   Additional items Assist x 1;Verbal cues; Tactile cues   Assistive Device Rolling walker   Distance 10 feet with change in direction and cuing for proper walker usage   Exercises   Heelslides Supine;10 reps;Bilateral   Hip Flexion Sitting;10 reps;Bilateral   Hip Abduction Sitting;20 reps;Bilateral   Knee AROM Long Arc Quad Sitting;10 reps;Bilateral   Ankle Pumps Sitting;20 reps;Bilateral   Balance training  side stepping and backward walking completed for strengthening and balance training   Assessment   Assessment Patient cooperative and tolerated pT session with family present  Patient will benefit from continued PT with progression as tolerated   Plan   Treatment/Interventions ADL retraining;Functional transfer training;LE strengthening/ROM; Therapeutic exercise; Endurance training;Patient/family training;Equipment eval/education; Bed mobility;Gait training; Compensatory technique education   PT Frequency 5x/wk   Recommendation   Recommendation Short-term skilled PT   Licensure   [place] License Number  Aleksandra Martine PT [id number]

## 2019-10-14 NOTE — PROGRESS NOTES
[facility] Progress Note  Patient: Anjali Yanes 80 y o  female   MRN: [id number]  PCP: Vadim Kelley MD  Unit/Bed#: [address] Encounter: [id number]  Date Of Visit: 10/14/19    Problem List:    Active Problems:    Pulmonary embolism (HCC)    Comfort measures only status    Type 2 myocardial infarction [facility])    Acute cystitis    Essential hypertension    Malignant neoplasm of overlapping sites of right breast in female, estrogen receptor positive ([facility] [address] )    Liver mass    Weakness generalized    Moderate dementia with behavioral disturbance ([facility] [address] )      Assessment & Plan:    * Severe sepsis (HCC)resolved as of 10/12/2019  Assessment & Plan  As noted by leukocytosis, tachycardia, lactic acidosis with lactic acid of 4 1 with source of infection being acute cystitis  Patient received 30 mL/kg fluid bolus in the ER  Patient had lactic acidosis despite multiple fluid boluses which is most likely from her liver dysfunction/lesions  Lactic acid normalized  blood cultures negative so far, urine culture growing greater than 100,000 E coli  CT chest, abdomen/pelvis was negative for any infectious etiology  Due to O2 episodes of hypotension, patient was transferred to the ICU and was started on epinephrine which has since been discontinued      Comfort measures only status  Assessment & Plan  After multiple conversations with family previously, patient was placed on comfort measures only  Patient's family met with Hospice representative today  Later in the day spoke with patient's family members present at bedside and now they do not want comfort measures only  Patient's family is not interested in hospice  They would like for her to be placed back on all her medications  They would like patient to get stronger  Discontinued morphine, Rambo p r n      Pulmonary embolism [facility])  Assessment & Plan  Patient noted to have large acute bilateral PE on CTA chest with right heart strain along with small bilateral pleural effusions  Patient was unstable with acute PE  Blood pressures were been soft/hypotensive despite multiple fluid boluses previously and patient was transferred to the ICU  Patient was started on weight based lovenox and then transitioned to Eliquis  Venous Doppler showed acute occlusive DVT in popliteal vein, posterior tibial veins and gastrocnemius veins of right lower extremity    Acute cystitis  Assessment & Plan  Patient received a dose of IV cefepime in the ER  Patient was started on on IV Rocephin and later switched to cefepime and vancomycin   Vancomycin was discontinued and was then switched to Rocephin  Now off antibiotics  urine culture growing > 100,000 E coli  Type 2 myocardial infarction [facility])  Assessment & Plan  Patient's initial troponin noted to be 0 45 with new EKG changes in the setting of severe sepsis, cystitis, acute PE  Troponin peaked at 0 54  Patient received a full dose of aspirin now restarted back on baby aspirin (discussed with Cardiology)   Lopressor was previously discontinued as patient was noted to be hypotensive  echo showed normal EF with grade 1 diastolic dysfunction  Moderate pulmonary hypertension was noted along with increased RV pressures      Essential hypertension  Assessment & Plan  Lopressor and lisinopril was discontinued as patient was noted to be hypotensive  Patient's blood pressures are now high and restarted on home dose of lisinopril    Moderate dementia with behavioral disturbance ([facility] [address] )  Assessment & Plan  Restarted Namenda, Aricept and Lexapro which was started here as patient is no longer comfort measures only  She was seen by Psychiatry     Weakness generalized  Assessment & Plan  Most likely due to severe sepsis, cystitis, acute PE, deconditioning  PT/OT    Liver mass  Assessment & Plan  On CT scan, patient noted to have several liver masses which could be metastatic disease    Few liver cysts were also noted  Oncology was consulted  Patient with elevated liver enzymes on prior lab work  Right upper quadrant ultrasound showed hepatic cyst, cholelithiasis  Malignant neoplasm of overlapping sites of right breast in female, estrogen receptor positive [facility])  Assessment & Plan  Patient is status post right mastectomy  She was on tamoxifen which was discontinued  Will discuss with Oncology tomorrow regarding restarting medication    Shock (HCC)resolved as of 10/12/2019  Assessment & Plan  Likely due to bilateral PE causing right heart strain, sepsis  She was previously on epinephrine    Diarrhearesolved as of 10/10/2019  Assessment & Plan  As per daughter, patient with diarrhea since day prior to admission  Resolved        VTE Pharmacologic Prophylaxis:   Pharmacologic: Apixaban (Eliquis)  Mechanical VTE Prophylaxis in Place: No    Patient Centered Rounds: I have performed bedside rounds with nursing staff today  Discussions with Specialists or Other Care Team Provider: yes    Education and Discussions with Family / Patient: yes - daughter, son    Time Spent for Care: 45 min  More than 50% of total time spent on counseling and coordination of care as described above  Current Length of Stay: 5 day(s)    Current Patient Status: Inpatient   Certification Statement: The patient will continue to require additional inpatient hospital stay due to acute PE    Discharge Plan: STR    Code Status: Level 3 - DNAR and DNI      Subjective:     Patient wondering why she is still in the hospital   States that her breathing is okay    Objective:     Vitals:   Temp (24hrs), Av °F (36 7 °C), Min:98 °F (36 7 °C), Max:98 °F (36 7 °C)    Temp:  [98 °F (36 7 °C)] 98 °F (36 7 °C)  HR:  [82] 82  Resp:  [18] 18  BP: (174)/(96) 174/96  SpO2:  [95 %] 95 %  Body mass index is 26 01 kg/m²       Input and Output Summary (last 24 hours):     No intake or output data in the 24 hours ending 10/14/19 1959    Physical Exam:     Physical Exam   Constitutional: No distress  HENT:   Head: Normocephalic and atraumatic  Eyes: EOM are normal  Right eye exhibits no discharge  Left eye exhibits no discharge  No scleral icterus  Neck: Neck supple  Cardiovascular: Normal rate and regular rhythm  Murmur heard  Pulmonary/Chest: Effort normal and breath sounds normal  No respiratory distress  She has no wheezes  She has no rales  Abdominal: Soft  Bowel sounds are normal  She exhibits no distension  There is no tenderness  Musculoskeletal: She exhibits no edema  Neurological: She is alert  No cranial nerve deficit  Oriented to self and place   Skin: She is not diaphoretic  Additional Data:     Labs:    Results from last 7 days   Lab Units 10/12/19  0502  10/10/19  0431   WBC Thousand/uL 16 48*   < > 23 37*   HEMOGLOBIN g/dL 9 5*   < > 10 4*   HEMATOCRIT % 30 2*   < > 34 3*   PLATELETS Thousands/uL 175   < > 159   NEUTROS PCT %  --   --  88*   LYMPHS PCT %  --   --  5*   MONOS PCT %  --   --  6   EOS PCT %  --   --  0    < > = values in this interval not displayed  Results from last 7 days   Lab Units 10/12/19  0502   POTASSIUM mmol/L 4 5   CHLORIDE mmol/L 111*   CO2 mmol/L 18*   BUN mg/dL 35*   CREATININE mg/dL 1 51*   CALCIUM mg/dL 7 8*   ALK PHOS U/L 48   ALT U/L 122*   AST U/L 56*     Results from last 7 days   Lab Units 10/09/19  0951   INR  1 04       * I Have Reviewed All Lab Data Listed Above  * Additional Pertinent Lab Tests Reviewed: [facility] Admission Reviewed      Imaging:  Imaging Reports Reviewed Today Include: Vas doppler  Imaging Personally Reviewed by Myself Includes:  N/A    Recent Cultures (last 7 days):     Results from last 7 days   Lab Units 10/09/19  1053 10/09/19  0951 10/09/19  0946   BLOOD CULTURE   --  No Growth After 5 Days  No Growth After 5 Days     URINE CULTURE  >100,000 cfu/ml Escherichia coli*  --   --        Last 24 Hours Medication List:     Current Facility-Administered Medications:  acetaminophen 650 mg Oral Q6H PRN Genora Aschoff, CRNP   apixaban 10 mg Oral BID Genora Aschoff, CRNP   [START ON 10/18/2019] apixaban 5 mg Oral BID RUTH Acosta   [START ON 10/15/2019] aspirin 81 mg Oral QAM Kyung Revankar, DO   brimonidine-timolol 1 drop Both Eyes Daily RUTH Acosta   donepezil 5 mg Oral HS Kyung Revankar, DO   [START ON 10/15/2019] escitalopram 5 mg Oral Daily Kyung Revankar, DO   [START ON 10/15/2019] lisinopril 20 mg Oral QAM Kyung Revankar, DO   [START ON 10/15/2019] Memantine HCl ER 7 mg Oral QAM Kyung Revankar, DO   [START ON 10/15/2019] multivitamin-minerals 1 tablet Oral Daily Kyung Revankar, DO   ondansetron 4 mg Intravenous Q6H PRN Genora Aschoff, CRNP          Today, Patient Was Seen By: Jimmye Boast, DO    ** Please Note: "This note has been constructed using a voice recognition system  Therefore there may be syntax, spelling, and/or grammatical errors   Please call if you have any questions  "**

## 2019-10-14 NOTE — PROGRESS NOTES
Hematology - Oncology Progress Note    Lynnette Posadas 12/17/1930, [id number]  [address] B      Impression and plan:    80-year-old female with history of stage IIIB right breast cancer admitted with weakness and fatigue  Issues    Sepsis  Patient has completed antibiotics  History of stage III breast cancer, new finding of liver lesions  The obvious concern is metastatic disease (from breast, other)  Patient is now comfort care  Most important at this time is pain control, patient support, family support  Mrs Ashley King would like to be discharged as soon as possible  Pulmonary embolism  Patient's respiratory status seems to be relatively stable  Patient is now on Eliquis - needs to be monitored for any signs of excessive bruising or bleeding  The above was discussed with patient and family; all questions were answered  30 minutes was spent with patient, 50% of time coordinating care on the floor  __________________________________________________________________________________________    Chief complaint:  Weakness, fatigue    History of present illness: 80-year-old female with a history of breast cancer admitted with progressive fatigue and diarrhea  Mrs Ashley King states feeling okay, slightly better than before  Still with some dyspnea on exertion  No chest pain or pressure  No other pain control issues  Appetite is okay, no GI,  or GYN issues  Activities are still extremely limited  Oncology history:  Patient was previously diagnosed with right-sided breast cancer (large mass, pT4b pN1 M0 R0 G2 ER/[place] positive, HER-2/johnson negative = stage III B) status post mastectomy/lymph node dissection  PET scan at that time did not demonstrate any evidence for recurrence, clinically there were no concerning signs either  Issues were discussed with the patient and family at that time and the plan was to treat with tamoxifen only (June 2016)    Just prior to the diagnosis,   Frederick Maxwell had lost her ; had significant issues with grieving and depression  Patient also was battling progressive dementia  Since that time, it has been difficult to get blood work on the patient, Mrs Frederick Maxwell has also refused mammograms in the past   Patient also refused some of the oncology office visits  Clinically there was never evidence for recurrence      Hospital medications list:    Current Facility-Administered Medications:  acetaminophen 650 mg Oral Q6H PRN RUTH Jacques   apixaban 10 mg Oral BID RUTH Acosta   [START ON 10/18/2019] apixaban 5 mg Oral BID RUTH Acosta   brimonidine-timolol 1 drop Both Eyes Daily Amelia RUTH Louis   glycopyrrolate 0 2 mg Intravenous Q1H PRN RUTH Rodgers   morphine injection 2 mg Intravenous Q1H PRN RUTH Acosta   ondansetron 4 mg Intravenous Q6H PRN RUTH Jacques     Physical exam  /99   Pulse 86   Temp 98 2 °F (36 8 °C)   Resp 18   Ht 5' 2" (1 575 m)   Wt 64 5 kg (142 lb 3 2 oz)   SpO2 96%   Breastfeeding?  No   BMI 26 01 kg/m²   Constitutional: Well formed, well-nourished, minimal respiratory distress, nasal cannula O2 in place  Eyes: PERRL, conjunctiva pain, anicteric    HENT: Atraumatic, external ears normal, nose normal,    oropharynx:  moist, no pharyngeal exudates, no thrush, pink  Neck: Good range of motion, no adenopathy  Respiratory:  Scattered bilateral rhonchi, fair to good air entry bilaterally  Cardiovascular: Normal rate, normal rhythm, no murmurs, no gallops, no rubs    GI: Soft, nondistended, normal bowel sounds, nontender, no organomegaly, no mass, no rebound, no guarding    : No costovertebral angle tenderness, normal reproductive organs, no discharge  Musculoskeletal: No pain or tenderness with palpation of joints, muscles or bones  Integument:  Slightly pale, warm, moist, no petechiae or ecchymoses  Lymphatic: No adenopathy in the neck, supra-clavicular region, axilla and groin bilaterally  Extremities:  No lower extremity edema, no cords, pulses are 1+  Neurologic: Alert & oriented x 3, CN 2-12 normal, normal motor function, normal sensory function, no focal deficits noted  Psychiatric:  Pleasant, responsive, appropriate  Rectal: Deferred    Laboratory test results    Results for Verena Angulo (MRN [id number]) as of 10/14/2019 09:42   Ref  Range 10/12/2019 05:02   WBC Latest Ref Range: 4 31 - 10 16 Thousand/uL 16 48 (H)   Red Blood Cell Count Latest Ref Range: 3 81 - 5 12 Million/uL 3 10 (L)   Hemoglobin Latest Ref Range: 11 5 - 15 4 g/dL 9 5 (L)   HCT Latest Ref Range: 34 8 - 46 1 % 30 2 (L)   MCV Latest Ref Range: 82 - 98 fL 97   MCH Latest Ref Range: 26 8 - 34 3 pg 30 6   MCHC Latest Ref Range: 31 4 - 37 4 g/dL 31 5   RDW Latest Ref Range: 11 6 - 15 1 % 15 9 (H)   Platelet Count Latest Ref Range: 149 - 390 Thousands/uL 175           Imaging     10/10/2019 CTA chest PE study     Large acute bilateral pulmonary emboli with new small bilateral pleural effusions and CT evidence of RIGHT HEART STRAIN  Small ascending thoracic aortic aneurysm measuring 4 1 cm  Hiatal hernia  Dependent atelectasis in the lung bases      10/09/2019 CT scan of the chest and abdomen pelvis     There are several indeterminate but potentially suspicious liver masses which might be metastatic disease   There also appear to be a couple liver cysts  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  There is a hiatal hernia  Right mastectomy  Indeterminate but probably cystic left renal lesion measures 2 9 cm  Mild colonic diverticulosis     PET/CT was performed on May 10, 2016  Impression stated that there was a large hypermetabolic right breast mass and right axillary metastatic adenopathy  There was no additional hypermetabolic metastases visualized  Patient had severe bilateral hydroureteronephrosis likely due to a prolapsed bladder   Patient had a ascending thoracic aorta measuring 4 x 4 2 cm        Pathology     6/15/16 right mastectomy demonstrated mucinous carcinoma, grade 2, 10 2 cm, involving the nipple with surface ulceration  Skeletal muscle was negative for carcinoma  Lymphovascular invasion was present  Dermal lymphovascular invasion was present  DCIS and LCIS were not identified  All margins were negative for carcinoma, the closest was the posterior margin at 1 8 mm  2/21 lymph nodes were positive for metastatic carcinoma   The prior biopsy ([id number]) demonstrated ER = 100%, MA = 70-75%, HER-2/johnson 1+      AJCC = pT4b Pn1 M0 R0 G2 ER/MA positive, HER-2/johnson negative = stage III B     5/3/16 right breast core biopsy demonstrated invasive carcinoma of no special type, no in situ carcinoma identified, lymphovascular invasion not identified, microcalcifications not identified

## 2019-10-14 NOTE — CONSULTS
Visited w/Pt and three adult children on 10/14/19, ending at 10:10 (visit was about 20 minutes, including providing Sacrament of the Sick)  Pt was able to express some humor, as well as tears, but stated she was not in any pain and not afraid of anything  Pt and family asked for SoS, which I provided, after asking if they wanted me to contact local [address] [place] [address], which they declined  Family stated they were originally [place], but had been attending a [facility] in [place], lately  Family seemed well integrated and connected, supportive and loving  Pt had to be reminded that she was on [address] (this done by son) and this made her teary for a few minutes  Family and Pt became teary during [address]

## 2019-10-14 NOTE — PROGRESS NOTES
Pastoral Care Progress Note    10/14/2019  Patient: Zacarias Dozier : 1930  Admission Date & Time: 10/9/2019 9272  MRN: [id number] [facility]: [id number]                     Chaplaincy Interventions Utilized:   Empowerment: Encouraged focus on present, Normalized experience of patient/family and Provided anticipatory guidance    Exploration: Explored hope, Explored emotional needs & resources and Explored spiritual needs & resources    Relationship Building: Cultivated a relationship of care and support and Listened empathically    Ritual: Shelbi Deal provided sacrament of the sick and Provided prayer    Chaplaincy Outcomes Achieved:  Expressed gratitude, Expressed humor, Expressed intermediate hope, Expressed peace, Expressed ultimate hope, Progressed toward acceptance, Progressed toward focus on present, Progressed toward meaning, Spiritual resources utilized and Tearfully processed emotions      Spiritual Coping Strategies Utilized:   Spiritual practices, Spiritual comfort, Spiritual meaning and Positive spiritual reframing

## 2019-10-15 ENCOUNTER — EPISODE CHANGES (OUTPATIENT)
Dept: CASE MANAGEMENT | Facility: HOSPITAL | Age: 84
End: 2019-10-15

## 2019-10-15 LAB
ALBUMIN SERPL BCP-MCNC: 2.5 G/DL (ref 3.5–5)
ALP SERPL-CCNC: 47 U/L (ref 46–116)
ALT SERPL W P-5'-P-CCNC: 72 U/L (ref 12–78)
ANION GAP SERPL CALCULATED.3IONS-SCNC: 10 MMOL/L (ref 4–13)
AST SERPL W P-5'-P-CCNC: 43 U/L (ref 5–45)
BILIRUB SERPL-MCNC: 0.6 MG/DL (ref 0.2–1)
BUN SERPL-MCNC: 22 MG/DL (ref 5–25)
CALCIUM SERPL-MCNC: 7.4 MG/DL (ref 8.3–10.1)
CHLORIDE SERPL-SCNC: 111 MMOL/L (ref 100–108)
CO2 SERPL-SCNC: 22 MMOL/L (ref 21–32)
CREAT SERPL-MCNC: 0.85 MG/DL (ref 0.6–1.3)
ERYTHROCYTE [DISTWIDTH] IN BLOOD BY AUTOMATED COUNT: 16 % (ref 11.6–15.1)
GFR SERPL CREATININE-BSD FRML MDRD: 61 ML/MIN/1.73SQ M
GLUCOSE SERPL-MCNC: 90 MG/DL (ref 65–140)
HCT VFR BLD AUTO: 31.6 % (ref 34.8–46.1)
HGB BLD-MCNC: 10.1 G/DL (ref 11.5–15.4)
MCH RBC QN AUTO: 31.1 PG (ref 26.8–34.3)
MCHC RBC AUTO-ENTMCNC: 32 G/DL (ref 31.4–37.4)
MCV RBC AUTO: 97 FL (ref 82–98)
PLATELET # BLD AUTO: 250 THOUSANDS/UL (ref 149–390)
PMV BLD AUTO: 10.4 FL (ref 8.9–12.7)
POTASSIUM SERPL-SCNC: 3.7 MMOL/L (ref 3.5–5.3)
PROT SERPL-MCNC: 5.5 G/DL (ref 6.4–8.2)
RBC # BLD AUTO: 3.25 MILLION/UL (ref 3.81–5.12)
SODIUM SERPL-SCNC: 143 MMOL/L (ref 136–145)
WBC # BLD AUTO: 13.2 THOUSAND/UL (ref 4.31–10.16)

## 2019-10-15 PROCEDURE — 80053 COMPREHEN METABOLIC PANEL: CPT | Performed by: FAMILY MEDICINE

## 2019-10-15 PROCEDURE — 85027 COMPLETE CBC AUTOMATED: CPT | Performed by: FAMILY MEDICINE

## 2019-10-15 PROCEDURE — 99233 SBSQ HOSP IP/OBS HIGH 50: CPT | Performed by: INTERNAL MEDICINE

## 2019-10-15 RX ORDER — ESCITALOPRAM OXALATE 5 MG/1
5 TABLET ORAL DAILY
Refills: 0
Start: 2019-10-16

## 2019-10-15 RX ADMIN — ESCITALOPRAM 5 MG: 5 TABLET, FILM COATED ORAL at 09:42

## 2019-10-15 RX ADMIN — Medication 1 TABLET: at 09:41

## 2019-10-15 RX ADMIN — APIXABAN 10 MG: 5 TABLET, FILM COATED ORAL at 09:41

## 2019-10-15 RX ADMIN — LISINOPRIL 20 MG: 20 TABLET ORAL at 09:46

## 2019-10-15 RX ADMIN — ASPIRIN 81 MG 81 MG: 81 TABLET ORAL at 09:42

## 2019-10-15 NOTE — SOCIAL WORK
Pt is accepted to [facility] and is medically cleared for discharge today  IMM reviewed with lucita Giron via phone [phone] and he voiced understanding  Copy left at bedside for son  [facility] transport setup for 3pm with [place]  Son informed

## 2019-10-15 NOTE — NURSING NOTE
Discharge instructions and med recon reviewed with [address]  Gave report to DIANA Garcia on [address]  Pt to go to room 413A  IV access and Zen removed  All paperwork sent to the facility  Pt's son given back her eyedrops from home  No further needs

## 2019-10-15 NOTE — [place] UNIVERSAL TRANSFER FORM
[place] UNIVERSAL TRANSFER FORM  (ALL ITEMS MUST BE COMPLETED)    1  TRANSFER FROM: [facility]      TRANSFER TO: [facility] [place]    2  DATE OF TRANSFER: 10/15/2019                        TIME OF TRANSFER: 15:00    3  PATIENT NAME: Clydene Habermann, W      YOB: 1930                             GENDER: female    4  LANGUAGE:   English    5  PHYSICIAN NAME:  Sharita Casillas MD                   PHONE: [phone]    6  CODE STATUS: Level 3 - DNAR and DNI        Out of Hospital DNR Attached: No    7  :                                      :  Extended Emergency Contact Information  Primary Emergency Contact: Marlyn Quintero   [address] Phone: [phone]  Relation: None  Secondary Emergency Contact: Dorian Quintero   [address] Phone: [phone]  Relation: None           Health Care Representative/Proxy:  No           Legal Guardian:  No             NAME OF:           HEALTH CARE REPRESENTATIVE/PROXY:                                         OR           LEGAL GUARDIAN, IF NOT :                                               PHONE:  (Day)           (Night)                        (Cell)    8  REASON FOR TRANSFER: (Must include brief medical history and recent changes in physical function or cognition ) Medically stable and ready for discharge  V/S: /90 (BP Location: Left arm)   Pulse 84   Temp 97 9 °F (36 6 °C) (Oral)   Resp 18   Ht 5' 2" (1 575 m)   Wt 64 5 kg (142 lb 3 2 oz)   SpO2 98%   Breastfeeding? No   BMI 26 01 kg/m²           PAIN: None    9  PRIMARY DIAGNOSIS: Severe sepsis ([address] )      Secondary Diagnosis:         Pacemaker: No      Internal Defib: No          Mental Health Diagnosis (if Applicable):    10  RESTRAINTS: No     11  RESPIRATORY NEEDS: Oxygen Device nasal cannula, and Flow rate: 3 liters    12  ISOLATION/PRECAUTION: None    13  ALLERGY: Patient has no known allergies  14  SENSORY:       Vision Good, Hearing Good  and Speech Clear    15  SKIN CONDITION: No Wounds    16  DIET: Regular    17  IV ACCESS: None    18  PERSONAL ITEMS SENT WITH PATIENT: None    19  ATTACHED DOCUMENTS: MUST ATTACH CURRENT MEDICATION INFORMATION Face Sheet, MAR, Medication Reconciliation, Labs, PT Note and OT Note    20  AT RISK ALERTS:Falls        HARM TO: N/A    21  WEIGHT BEARING STATUS:         Left Leg: Full        Right Leg: Full    22  MENTAL STATUS:Forgetful and Disoriented    23  FUNCTION:        Walk: With Help        Transfer: With Help        Toilet: With Help        Feed: Self    24  IMMUNIZATIONS/SCREENING:     Immunization History   Administered Date(s) Administered    Influenza Quadrivalent Preservative Free 3 years and older IM 01/25/2016    Influenza, high dose seasonal 0 5 mL 10/09/2019    Pneumococcal Polysaccharide PPV23 01/25/2016       25  BOWEL: Incontinent  and Date Last BM10/14/2019    26  BLADDER: Incontinent    27   SENDING FACILITY CONTACT: Elena Ramirez                  Title: Registered Nurse        [facility]        Phone: [phone]          [address] (if known): Candance Ball        Title: LPN        Unit: (Room 413A)        Phone: [phone]         FORM PREFILLED BY (if applicable)       Title:       Unit:        Phone:         FORM COMPLETED BY Elena Ramirez RN      Title: Registered Nurse      Phone: [phone]

## 2019-10-15 NOTE — PLAN OF CARE
Problem: Potential for Falls  Goal: Patient will remain free of falls  Description  INTERVENTIONS:  - Assess patient frequently for physical needs  -  Identify cognitive and physical deficits and behaviors that affect risk of falls    -  [place] fall precautions as indicated by assessment   - Educate patient/family on patient safety including physical limitations  - Instruct patient to call for assistance with activity based on assessment  - Modify environment to reduce risk of injury  - Consider OT/PT consult to assist with strengthening/mobility  Outcome: Progressing     Problem: Prexisting or High Potential for Compromised Skin Integrity  Goal: Skin integrity is maintained or improved  Description  INTERVENTIONS:  - Identify patients at risk for skin breakdown  - Assess and monitor skin integrity  - Assess and monitor nutrition and hydration status  - Monitor labs   - Assess for incontinence   - Turn and reposition patient  - Assist with mobility/ambulation  - Relieve pressure over bony prominences  - Avoid friction and shearing  - Provide appropriate hygiene as needed including keeping skin clean and dry  - Evaluate need for skin moisturizer/barrier cream  - Collaborate with interdisciplinary team   - Patient/family teaching    Outcome: Progressing     Problem: PAIN - ADULT  Goal: Verbalizes/displays adequate comfort level or baseline comfort level  Description  Interventions:  - Encourage patient to monitor pain and request assistance  - Assess pain using appropriate pain scale  - Administer analgesics based on type and severity of pain and evaluate response  - Implement non-pharmacological measures as appropriate and evaluate response  - Consider cultural and social influences on pain and pain management  - Notify physician/advanced practitioner if interventions unsuccessful or patient reports new pain  Outcome: Progressing     Problem: INFECTION - ADULT  Goal: Absence or prevention of progression during hospitalization  Description  INTERVENTIONS:  - Assess and monitor for signs and symptoms of infection  - Monitor lab/diagnostic results  - Monitor all insertion sites, i e  indwelling lines, tubes, and drains  - Monitor endotracheal if appropriate and nasal secretions for changes in amount and color  - [place] appropriate cooling/warming therapies per order  - Administer medications as ordered  - Instruct and encourage patient and family to use good hand hygiene technique  - Identify and instruct in appropriate isolation precautions for identified infection/condition  Outcome: Progressing  Goal: Absence of fever/infection during neutropenic period  Description  INTERVENTIONS:  - Monitor WBC    Outcome: Progressing     Problem: SAFETY ADULT  Goal: Maintain or return to baseline ADL function  Description  INTERVENTIONS:  -  Assess patient's ability to carry out ADLs; assess patient's baseline for ADL function and identify physical deficits which impact ability to perform ADLs (bathing, care of mouth/teeth, toileting, grooming, dressing, etc )  - Assess/evaluate cause of self-care deficits   - Assess range of motion  - Assess patient's mobility; develop plan if impaired  - Assess patient's need for assistive devices and provide as appropriate  - Encourage maximum independence but intervene and supervise when necessary  - Involve family in performance of ADLs  - Consider OT consult to assist with ADL evaluation and planning for discharge  - Provide patient education as appropriate  Outcome: Progressing  Goal: Maintain or return mobility status to optimal level  Description  INTERVENTIONS:  - Assess patient's baseline mobility status (ambulation, transfers, stairs, etc )    - Identify cognitive and physical deficits and behaviors that affect mobility  - Identify mobility aids required to assist with transfers and/or ambulation (gait belt, sit-to-stand, lift, walker, cane, etc )  - [place] fall precautions as indicated by assessment  - Record patient progress and toleration of activity level on Mobility SBAR; progress patient to next Phase/Stage  - Instruct patient to call for assistance with activity based on assessment  - Consider rehabilitation consult to assist with strengthening/weightbearing, etc   Outcome: Progressing     Problem: DISCHARGE PLANNING  Goal: Discharge to home or other facility with appropriate resources  Description  INTERVENTIONS:  - Identify barriers to discharge w/patient and caregiver  - Arrange for needed discharge resources and transportation as appropriate  - Identify discharge learning needs (meds, wound care, etc )  - Arrange for interpretive services to assist at discharge as needed  - Refer to Case Management Department for coordinating discharge planning if the patient needs post-hospital services based on physician/advanced practitioner order or complex needs related to functional status, cognitive ability, or social support system  Outcome: Progressing     Problem: Knowledge Deficit  Goal: Patient/family/caregiver demonstrates understanding of disease process, treatment plan, medications, and discharge instructions  Description  Complete learning assessment and assess knowledge base    Interventions:  - Provide teaching at level of understanding  - Provide teaching via preferred learning methods  Outcome: Progressing     Problem: CARDIOVASCULAR - ADULT  Goal: Maintains optimal cardiac output and hemodynamic stability  Description  INTERVENTIONS:  - Monitor I/O, vital signs and rhythm  - Monitor for S/S and trends of decreased cardiac output  - Administer and titrate ordered vasoactive medications to optimize hemodynamic stability  - Assess quality of pulses, skin color and temperature  - Assess for signs of decreased coronary artery perfusion  - Instruct patient to report change in severity of symptoms  Outcome: Progressing  Goal: Absence of cardiac dysrhythmias or at baseline rhythm  Description  INTERVENTIONS:  - Continuous cardiac monitoring, vital signs, obtain 12 lead EKG if ordered  - Administer antiarrhythmic and heart rate control medications as ordered  - Monitor electrolytes and administer replacement therapy as ordered  Outcome: Progressing     Problem: GENITOURINARY - ADULT  Goal: Maintains or returns to baseline urinary function  Description  INTERVENTIONS:  - Assess urinary function  - Encourage oral fluids to ensure adequate hydration if ordered  - Administer IV fluids as ordered to ensure adequate hydration  - Administer ordered medications as needed  - Offer frequent toileting  - Follow urinary retention protocol if ordered  Outcome: Progressing  Goal: Absence of urinary retention  Description  INTERVENTIONS:  - Assess patients ability to void and empty bladder  - Monitor I/O  - Bladder scan as needed  - Discuss with physician/AP medications to alleviate retention as needed  - Discuss catheterization for long term situations as appropriate  Outcome: Progressing  Goal: Urinary catheter remains patent  Description  INTERVENTIONS:  - Assess patency of urinary catheter  - If patient has a chronic diaz, consider changing catheter if non-functioning  - Follow guidelines for intermittent irrigation of non-functioning urinary catheter  Outcome: Progressing

## 2019-10-15 NOTE — PROGRESS NOTES
Hematology - Oncology Progress Note    Bella Onofre 12/17/1930, [id number]  [address]      Impression and plan:    51-year-old female with history of stage III right breast cancer admitted with weakness and fatigue  Patient is being treated for sepsis, also found to have a pulmonary embolism and is on Eliquis  As I understood previously, patient/family were leaning towards comfort care  Code status is level 3, DNR  I spoke with Dr Anderson Reusing today  Family apparently has changed their mind as far as the workup and treatment  From an oncology standpoint, patient should be treated for the sepsis and the pulmonary embolism  Although Eliquis is not considered the standard of care for patients with thrombosis and cancer, I believe it is acceptable for this patient as far as quality of life issues  Respiratory status needs to be monitored closely obviously  I would hold off on restarting the tamoxifen for now  It may be that the tamoxifen caused the thrombosis  Additionally, if the liver lesions were to be found to be progressive breast cancer, the tamoxifen would be considered a treatment failure and would not be indicated  With all of the patient's complications and the fact that Mrs Pily Buchanan has always been extremely unhappy coming into the oncology office for the oncology follow-ups, not working up the liver and not continuing with tamoxifen (or another medication) at this time is an acceptable treatment plan  When the family members are present, I will speak to them to try and understand what their goals and desires they have for Mrs Pily Buchanan     ________________________________________________________________________________________    Chief complaint:  Weakness, fatigue, diarrhea    History of present illness: 51-year-old female with a history of breast cancer admitted with progressive fatigue and diarrhea   Mrs Pily Buchanan  was found in bed asleep in no apparent distress      Oncology history:  Patient was previously diagnosed with right-sided breast cancer (large mass, pT4b pN1 M0 R0 G2 ER/MO positive, HER-2/johnson negative = stage III B) status post mastectomy/lymph node dissection   PET scan at that time did not demonstrate any evidence for recurrence, clinically there were no concerning signs either   Issues were discussed with the patient and family at that time and the plan was to treat with tamoxifen only (June 2016)   Just prior to the diagnosis, Mrs Richard Chavez lost her ; had significant issues with grieving and depression  Ida Ramos also was battling progressive dementia   Since that time, it has been difficult to get blood work on the patient, Mrs Rose Dhillon also refused mammograms in the past  Ida Ramos also refused some of the oncology office visits   Clinically there was never evidence for recurrence  Hospital medications list:    Current Facility-Administered Medications:  acetaminophen 650 mg Oral Q6H PRN RUTH Luciano   apixaban 10 mg Oral BID RUTH Acosta   [START ON 10/18/2019] apixaban 5 mg Oral BID RUTH Acosta   aspirin 81 mg Oral QAM Kyung Revankar, DO   brimonidine-timolol 1 drop Both Eyes Daily RUTH Acosta   donepezil 5 mg Oral HS Kyung Revankar, DO   escitalopram 5 mg Oral Daily Kyung Revankar, DO   lisinopril 20 mg Oral QAM Kyung Revankar, DO   Memantine HCl ER 7 mg Oral QAM Kyung Revankar, DO   multivitamin-minerals 1 tablet Oral Daily Kyung Revankar, DO   ondansetron 4 mg Intravenous Q6H PRN RUTH Luciano     Physical exam  /90 (BP Location: Left arm)   Pulse 84   Temp 97 9 °F (36 6 °C) (Oral)   Resp 18   Ht 5' 2" (1 575 m)   Wt 64 5 kg (142 lb 3 2 oz)   SpO2 98%   Breastfeeding?  No   BMI 26 01 kg/m²   Constitutional: Well formed, well-nourished, no obvious respiratory distress  Respiratory:  Scattered bilateral rhonchi  Integument:  Pale, warm, dry, scattered purpura  Extremities:  0-1 +bilateral lower extremity edema, no cords, pulses are 1+    Laboratory test results    Results for Radha Bullock (MRN [id number]) as of 10/15/2019 11:55   Ref  Range 10/15/2019 07:01   WBC Latest Ref Range: 4 31 - 10 16 Thousand/uL 13 20 (H)   Red Blood Cell Count Latest Ref Range: 3 81 - 5 12 Million/uL 3 25 (L)   Hemoglobin Latest Ref Range: 11 5 - 15 4 g/dL 10 1 (L)   HCT Latest Ref Range: 34 8 - 46 1 % 31 6 (L)   MCV Latest Ref Range: 82 - 98 fL 97   MCH Latest Ref Range: 26 8 - 34 3 pg 31 1   MCHC Latest Ref Range: 31 4 - 37 4 g/dL 32 0   RDW Latest Ref Range: 11 6 - 15 1 % 16 0 (H)   Platelet Count Latest Ref Range: 149 - 390 Thousands/uL 250     Results for Radha Bullock (MRN [id number]) as of 10/15/2019 11:55   Ref   Range 10/15/2019 07:01   Sodium Latest Ref Range: 136 - 145 mmol/L 143   Potassium Latest Ref Range: 3 5 - 5 3 mmol/L 3 7   Chloride Latest Ref Range: 100 - 108 mmol/L 111 (H)   CO2 Latest Ref Range: 21 - 32 mmol/L 22   Anion Gap Latest Ref Range: 4 - 13 mmol/L 10   BUN Latest Ref Range: 5 - 25 mg/dL 22   Creatinine Latest Ref Range: 0 60 - 1 30 mg/dL 0 85   Glucose, Random Latest Ref Range: 65 - 140 mg/dL 90   Calcium Latest Ref Range: 8 3 - 10 1 mg/dL 7 4 (L)   AST Latest Ref Range: 5 - 45 U/L 43   ALT Latest Ref Range: 12 - 78 U/L 72   Alkaline Phosphatase Latest Ref Range: 46 - 116 U/L 47   Total Protein Latest Ref Range: 6 4 - 8 2 g/dL 5 5 (L)   Albumin Latest Ref Range: 3 5 - 5 0 g/dL 2 5 (L)   TOTAL BILIRUBIN Latest Ref Range: 0 20 - 1 00 mg/dL 0 60   eGFR Latest Units: ml/min/1 73sq m 61

## 2019-10-16 NOTE — DISCHARGE SUMMARY
Discharge- Misty Ji 12/17/1930, 80 y o  female MRN: [id number]    Unit/Bed#: 2 [address] Encounter: [id number]    Primary Care Provider: Leslie Kiser MD   Date and time admitted to hospital: 10/9/2019  9:24 AM        Acute cystitis  Assessment & Plan  Patient received a dose of IV cefepime in the ER  Patient was started on on IV Rocephin and later switched to cefepime and vancomycin   Vancomycin was discontinued and was then switched to Rocephin  Now off antibiotics  urine culture grew> 100,000 E coli  Blood cultures have been negative  Procalcitonin level noted to be mildly elevated      Pulmonary embolism [facility])  Assessment & Plan  Patient noted to have large acute bilateral PE on CTA chest with right heart strain along with small bilateral pleural effusions  Patient was unstable with acute PE  Blood pressures were been soft/hypotensive despite multiple fluid boluses previously and patient was transferred to the ICU  Patient was started on weight based lovenox and then transitioned to Eliquis  Continue Eliquis upon discharge  Venous Doppler showed acute occlusive DVT in popliteal vein, posterior tibial veins and gastrocnemius veins of right lower extremity    Malignant neoplasm of overlapping sites of right breast in female, estrogen receptor positive [facility])  Assessment & Plan  Patient is status post right mastectomy  She was on tamoxifen which was discontinued  Tamoxifen  was discontinued after discussion with Oncology    Comfort measures only status  Assessment & Plan  After multiple conversations with family previously, patient was placed on comfort measures only  Patient's family met with Hospice representative on October 14, 2019  Later in the day based on discussion patient's family members present at bedside by Mónica Stock ,they do not want comfort measures only  Patient's family is not interested in hospice  They would like for her to be placed back on all her medications   They would like patient to get stronger  Patient's morphine and for Robinul were discontinued    Type 2 myocardial infarction [facility])  Assessment & Plan  Patient's initial troponin noted to be 0 45 with new EKG changes in the setting of severe sepsis, cystitis, acute PE  Troponin peaked at 0 54  Patient received a full dose of aspirin now restarted back on baby aspirin (discussed with Cardiology)   Lopressor was previously discontinued as patient was noted to be hypotensive  echo showed normal EF with grade 1 diastolic dysfunction  Moderate pulmonary hypertension was noted along with increased RV pressures  Continue baby aspirin and restarted on Lopressor      Liver mass  Assessment & Plan  On CT scan, patient noted to have several liver masses which could be metastatic disease  Few liver cysts were also noted  Oncology was consulted  Patient with elevated liver enzymes on prior lab work  Right upper quadrant ultrasound showed hepatic cyst, cholelithiasis    Outpatient follow-up with Dr Malik Labs hypertension  Assessment & Plan  Lopressor and lisinopril was discontinued as patient was noted to be hypotensive  Patient's blood pressure is currently running high  Restarted on Lopressor and lisinopril    Weakness generalized  Assessment & Plan  Most likely due to severe sepsis, cystitis, acute PE, deconditioning  PT/OT  Patient will be discharged to short-term rehabilitation    Moderate dementia with behavioral disturbance [facility])  Assessment & Plan  Restarted Namenda, Aricept and Lexapro which was started here as patient is no longer comfort measures only  She was seen by Psychiatry     Shock (HCC)resolved as of 10/12/2019  Assessment & Plan  Likely due to bilateral PE causing right heart strain, sepsis  She was previously on epinephrine    Diarrhearesolved as of 10/10/2019  Assessment & Plan  As per daughter, patient with diarrhea since day prior to admission  Resolved    * Severe sepsis (HCC)resolved as of 10/12/2019  Assessment & Plan  As noted by leukocytosis, tachycardia, lactic acidosis with lactic acid of 4 1 with source of infection being acute cystitis  Patient received 30 mL/kg fluid bolus in the ER  Patient had lactic acidosis despite multiple fluid boluses which is most likely from her liver dysfunction/lesions  Lactic acid normalized  blood cultures negative so far, urine culture growing greater than 100,000 E coli  CT chest, abdomen/pelvis was negative for any infectious etiology  Due to O2 episodes of hypotension, patient was transferred to the ICU and was started on epinephrine which has since been discontinued            Discharging Physician / Practitioner: Liliana Crump MD  PCP: Jonh Marte MD  Admission Date: 10/9/2019  Discharge Date: 10/15/19    Reason for Admission: Weakness - Generalized (C/O Gen weakness X1 week   Daughter reports patient could not get out of shower this morning so EMS was called  ) and Diarrhea (C/O diarrhea on off X 1 week   No recent antibiotic treatment )        Resolved Problems  Date Reviewed: 10/15/2019          Resolved    * (Principal) Severe sepsis ([facility] [address] ) 10/12/2019     Resolved by  RUTH Carrera    Diarrhea 10/10/2019     Resolved by  Loyd Portillo PA-C    Shock ([facility] [address] ) 10/12/2019     Resolved by  Carrie Martel, [address] [facility] Stay:  IP CONSULT TO CARDIOLOGY  IP CONSULT TO HEMATOLOGY  IP CONSULT TO CASE MANAGEMENT  IP CONSULT TO HOSPICE  IP CONSULT TO PASTORAL CARE    Procedures Performed:     · Lower extremity venous Doppler showed acute occlusive DVT in the popliteal vein, posterior tibial veins and gastrocnemius veins  · Echo showed EF of 50 for 60% without any regional wall motion abnormalities, grade 1 diastolic dysfunction    Significant Findings / Test Results:     ·   Results from last 7 days   Lab Units 10/15/19  0701 10/12/19  0502 10/11/19  0539   WBC Thousand/uL 13 20* 16 48* 16 19*   HEMOGLOBIN g/dL 10 1* 9 5* 9 6*   PLATELETS Thousands/uL 250 175 152     Results from last 7 days   Lab Units 10/15/19  0701 10/12/19  0502 10/11/19  0539   SODIUM mmol/L 143 139 140   POTASSIUM mmol/L 3 7 4 5 3 7   CHLORIDE mmol/L 111* 111* 112*   CO2 mmol/L 22 18* 17*   BUN mg/dL 22 35* 23   CREATININE mg/dL 0 85 1 51* 1 13   CALCIUM mg/dL 7 4* 7 8* 7 7*   TOTAL BILIRUBIN mg/dL 0 60 0 30 0 30   ALK PHOS U/L 47 48 39*   ALT U/L 72 122* 95*   AST U/L 43 56* 55*     Results from last 7 days   Lab Units 10/09/19  0951   INR  1 04     Results from last 7 days   Lab Units 10/09/19  2010 10/09/19  1658 10/09/19  1356   TROPONIN I ng/mL 0 45* 0 54* 0 47*     No results found for: HGBA1C  Results from last 7 days   Lab Units 10/11/19  0900   POC GLUCOSE mg/dl 146*     Results from last 7 days   Lab Units 10/12/19  0502 10/11/19  0539 10/10/19  2121 10/10/19  1245 10/10/19  0827 10/10/19  0642   LACTIC ACID mmol/L 1 3  --  1 8 4 7* 2 1* 2 4*   PROCALCITONIN ng/ml 0 61* 0 48*  --   --   --  0 23     Blood Culture:   Lab Results   Component Value Date    BLOODCX No Growth After 5 Days  10/09/2019    BLOODCX No Growth After 5 Days  10/09/2019     Urine Culture:   Lab Results   Component Value Date    URINECX >100,000 cfu/ml Escherichia coli (A) 10/09/2019    URINECX >100,000 cfu/ml  01/11/2018    URINECX >100,000 cfu/ml Escherichia coli 05/22/2017     Sputum Culture: No components found for: SPUTUMCX  Wound Culture: No results found for: WOUNDCULT     VAS lower limb venous duplex study, complete bilateral   Final Result by Marlyn Barrow MD (10/11 1935)       right upper quadrant   Final Result by Jeremiah Lugo MD (10/11 1326)      Limited study  Small hepatic cysts  Cholelithiasis without evidence of acute cholecystitis  Normal caliber common bile duct  Workstation performed: [id number]         XR chest portable   Final Result by Janice Mendenhall MD (60/91 2164)      No acute cardiopulmonary disease     Central line on the right  Workstation performed: [id number]         CTA chest pe study   Final Result by Caty Romero MD (10/10 0114)      Large acute bilateral pulmonary emboli with new small bilateral pleural effusions and CT evidence of RIGHT HEART STRAIN  Small ascending thoracic aortic aneurysm measuring 4 1 cm  Hiatal hernia  Dependent atelectasis in the lung bases  I personally discussed this study with [facility] on 10/10/2019 at 3:48 PM                      Workstation performed: [id number]         CT chest abdomen pelvis wo contrast   Final Result by Caty Romero MD (10/09 1200)      There are several indeterminate but potentially suspicious liver masses which might be metastatic disease  There also appear to be a couple liver cysts  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  There is a hiatal hernia  Right mastectomy  Indeterminate but probably cystic left renal lesion measures 2 9 cm  Mild colonic diverticulosis      The study was marked in EPIC for immediate notification  Workstation performed: [id number]         XR chest 1 view portable   Final Result by Lupe Thomas MD (10/09 1137)      No acute cardiopulmonary disease  Workstation performed: [id number]                  Outpatient Tests Requested:  · Follow-up with PCP and Dr Sandi Marcum    Complications:  None    Reason for Admission:   Chief Complaint   Patient presents with    Weakness - Generalized     C/O Gen weakness X1 week   Daughter reports patient could not get out of shower this morning so EMS was called   Diarrhea     C/O diarrhea on off X 1 week  No recent antibiotic treatment        Hospital Course:     Per HPI: Elissa Wing is a 80 y o  female patient with a PMH of varicose veins, hypertension, dementia who originally presented to the hospital on 10/9/2019 due to generalized weakness    In the ED patient was noted to have leukocytosis with tachycardia and lactic acidosis and UA was abnormal   Patient is admitted hospital for severe sepsis secondary urinary tract infection  Patient received IV cefepime and was later changed to IV Rocephin  Patient noted mild elevation of troponin patient was seen in consult with Cardiology and was started on aspirin and Lopressor  Due to persistent lactic acidosis on low blood pressure patient's antibiotics were broadened to cefepime and vancomycin  CT scan of the chest abdomen pelvis showed liver lesions and patient had right upper quadrant ultrasound which showed small hepatic cysts with cholelithiasis  Patient was transferred to the ICU due to labile blood pressures  Patient later developed acute shortness of breath and patient's echo showed increased RV pressures with RV dysfunction pulmonary hypertension  CT scan of the chest was done with contrast which showed acute PE  Patient was started on Lovenox and was seen in consult with Hematology  Later patient was transitioned to Eliquis  Blood cultures remained negative and patient was deescalate as stated back to IV Rocephin  ICU team had a prolonged discussion with family and patient was placed on comfort care  Patient was later transferred to the floor at which point patient woke up and was ambulating with help of the physical therapy  After discussion with the family patient was placed back on all her medications and family did not want any more comfort measures  Patient's repeat blood work showed improving white count and normal BUN and creatinine and patient remained stable  Patient will be discharged to short-term rehabilitation at [facility]  Discussed with son at bedside in detail  Hospital Course: Please see above list of diagnoses and related plan for additional information  Condition at Discharge: stable       Discharge Day Visit / Exam:     Subjective:  Patient is feeling well    Denies any chest pain, shortness of breath, abdominal pain, nausea vomiting  Vitals: Blood Pressure: 170/90 (10/15/19 0944)  Pulse: 84 (10/15/19 0944)  Temperature: 97 9 °F (36 6 °C) (10/15/19 0714)  Temp Source: Oral (10/15/19 0714)  Respirations: 18 (10/15/19 0714)  Height: 5' 2" (157 5 cm) (10/09/19 1307)  Weight - Scale: 64 5 kg (142 lb 3 2 oz) (10/12/19 0600)  SpO2: 98 % (10/15/19 0944)  Exam:   Physical Exam   Constitutional: No distress  HENT:   Head: Normocephalic and atraumatic  Nose: Nose normal    Eyes: Pupils are equal, round, and reactive to light  Conjunctivae are normal    Neck: Normal range of motion  Neck supple  Cardiovascular: Normal rate and regular rhythm  Murmur heard  Pulmonary/Chest: Effort normal and breath sounds normal  No respiratory distress  She has no wheezes  She has no rales  Abdominal: Soft  Bowel sounds are normal  She exhibits no distension  There is no tenderness  There is no rebound and no guarding  Musculoskeletal: She exhibits no edema  Neurological: She is alert  No cranial nerve deficit  Skin: Skin is warm and dry  No rash noted  Discharge instructions/Information to patient and family:   See after visit summary for information provided to patient and family  Provisions for Follow-Up Care:  See after visit summary for information related to follow-up care and any pertinent home health orders  Disposition:     [facility]    Planned Readmission: No     Discharge Statement:  I spent 35  minutes discharging the patient  This time was spent on the day of discharge  I had direct contact with the patient on the day of discharge  Greater than 50% of the total time was spent examining patient, answering all patient questions, arranging and discussing plan of care with patient as well as directly providing post-discharge instructions  Additional time then spent on discharge activities      Discharge Medications:  See after visit summary for reconciled discharge medications provided to patient and family        ** Please Note: This note has been constructed using a voice recognition system **

## 2019-10-17 ENCOUNTER — PATIENT OUTREACH (OUTPATIENT)
Dept: CASE MANAGEMENT | Facility: OTHER | Age: 84
End: 2019-10-17

## 2019-10-17 ENCOUNTER — EPISODE CHANGES (OUTPATIENT)
Dept: CASE MANAGEMENT | Facility: OTHER | Age: 84
End: 2019-10-17

## 2019-10-17 NOTE — PROGRESS NOTES
Informed Tarma Shah, from [facility], that this care manager has received this patient for the bundle program and if she would inquired if she would like any further information on patient

## 2019-10-22 ENCOUNTER — PATIENT OUTREACH (OUTPATIENT)
Dept: CASE MANAGEMENT | Facility: OTHER | Age: 84
End: 2019-10-22

## 2019-10-22 NOTE — PROGRESS NOTES
Email received from Kelly Quiles, at [address] [place] [address], stating patient was discharged home on 10/21/19 with hospice services through [facility], per family's request

## 2019-10-23 ENCOUNTER — PATIENT OUTREACH (OUTPATIENT)
Dept: CASE MANAGEMENT | Facility: OTHER | Age: 84
End: 2019-10-23

## 2019-10-23 NOTE — PROGRESS NOTES
Confirmed by Hodan Yañez that patient is receiving hospice services and was opened with them on 10/21/19  Will continue to follow up for Outpatient Care Management services

## 2019-11-01 VITALS
HEART RATE: 84 BPM | SYSTOLIC BLOOD PRESSURE: 170 MMHG | OXYGEN SATURATION: 98 % | HEIGHT: 62 IN | BODY MASS INDEX: 26.17 KG/M2 | TEMPERATURE: 97.9 F | DIASTOLIC BLOOD PRESSURE: 90 MMHG | WEIGHT: 142.2 LBS | RESPIRATION RATE: 18 BRPM

## 2019-11-29 ENCOUNTER — PATIENT OUTREACH (OUTPATIENT)
Dept: CASE MANAGEMENT | Facility: OTHER | Age: 84
End: 2019-11-29

## 2019-11-29 NOTE — PROGRESS NOTES
Spoke with Jonathan Tran, RN, at [facility], who stated patient  on 19  Will place FYI flag for , notify P   Lonny via in basket, and remove self from care team      Will close patient from [facility] program

## 2020-02-26 ENCOUNTER — DOCUMENTATION (OUTPATIENT)
Dept: HEMATOLOGY ONCOLOGY | Facility: MEDICAL CENTER | Age: 85
End: 2020-02-26

## 2020-02-26 NOTE — PROGRESS NOTES
Notified by family member, Dianna Emily passed away on 11/5/19   Dr Flavio Newby 3/24/20 f/u appt cancelled
